# Patient Record
Sex: FEMALE | Race: WHITE | Employment: OTHER | URBAN - METROPOLITAN AREA
[De-identification: names, ages, dates, MRNs, and addresses within clinical notes are randomized per-mention and may not be internally consistent; named-entity substitution may affect disease eponyms.]

---

## 2018-03-30 ENCOUNTER — HOSPITAL ENCOUNTER (EMERGENCY)
Facility: CLINIC | Age: 68
Discharge: HOME | DRG: 330 | End: 2018-03-30
Attending: EMERGENCY MEDICINE | Admitting: EMERGENCY MEDICINE
Payer: MEDICARE

## 2018-03-30 ENCOUNTER — APPOINTMENT (OUTPATIENT)
Dept: CT IMAGING | Facility: CLINIC | Age: 68
DRG: 330 | End: 2018-03-30
Attending: EMERGENCY MEDICINE
Payer: MEDICARE

## 2018-03-30 VITALS
TEMPERATURE: 97.6 F | OXYGEN SATURATION: 97 % | RESPIRATION RATE: 20 BRPM | HEART RATE: 85 BPM | DIASTOLIC BLOOD PRESSURE: 65 MMHG | SYSTOLIC BLOOD PRESSURE: 117 MMHG

## 2018-03-30 DIAGNOSIS — K59.01 SLOW TRANSIT CONSTIPATION: ICD-10-CM

## 2018-03-30 DIAGNOSIS — K57.92 ACUTE DIVERTICULITIS: ICD-10-CM

## 2018-03-30 LAB
ANION GAP SERPL CALCULATED.3IONS-SCNC: 8 MMOL/L (ref 3–14)
BASOPHILS # BLD AUTO: 0 10E9/L (ref 0–0.2)
BASOPHILS NFR BLD AUTO: 0.2 %
BUN SERPL-MCNC: 11 MG/DL (ref 7–30)
CALCIUM SERPL-MCNC: 9.3 MG/DL (ref 8.5–10.1)
CHLORIDE SERPL-SCNC: 102 MMOL/L (ref 94–109)
CO2 SERPL-SCNC: 25 MMOL/L (ref 20–32)
CREAT SERPL-MCNC: 0.8 MG/DL (ref 0.52–1.04)
DIFFERENTIAL METHOD BLD: ABNORMAL
EOSINOPHIL # BLD AUTO: 0 10E9/L (ref 0–0.7)
EOSINOPHIL NFR BLD AUTO: 0 %
ERYTHROCYTE [DISTWIDTH] IN BLOOD BY AUTOMATED COUNT: 13.1 % (ref 10–15)
GFR SERPL CREATININE-BSD FRML MDRD: 71 ML/MIN/1.7M2
GLUCOSE SERPL-MCNC: 155 MG/DL (ref 70–99)
HCT VFR BLD AUTO: 40.5 % (ref 35–47)
HGB BLD-MCNC: 13.5 G/DL (ref 11.7–15.7)
IMM GRANULOCYTES # BLD: 0.3 10E9/L (ref 0–0.4)
IMM GRANULOCYTES NFR BLD: 1.1 %
LYMPHOCYTES # BLD AUTO: 1.4 10E9/L (ref 0.8–5.3)
LYMPHOCYTES NFR BLD AUTO: 5.8 %
MCH RBC QN AUTO: 32.5 PG (ref 26.5–33)
MCHC RBC AUTO-ENTMCNC: 33.3 G/DL (ref 31.5–36.5)
MCV RBC AUTO: 98 FL (ref 78–100)
MONOCYTES # BLD AUTO: 1.2 10E9/L (ref 0–1.3)
MONOCYTES NFR BLD AUTO: 5 %
NEUTROPHILS # BLD AUTO: 21.7 10E9/L (ref 1.6–8.3)
NEUTROPHILS NFR BLD AUTO: 87.9 %
NRBC # BLD AUTO: 0 10*3/UL
NRBC BLD AUTO-RTO: 0 /100
PLATELET # BLD AUTO: 307 10E9/L (ref 150–450)
POTASSIUM SERPL-SCNC: 3.9 MMOL/L (ref 3.4–5.3)
RBC # BLD AUTO: 4.15 10E12/L (ref 3.8–5.2)
SODIUM SERPL-SCNC: 135 MMOL/L (ref 133–144)
WBC # BLD AUTO: 24.7 10E9/L (ref 4–11)

## 2018-03-30 PROCEDURE — 85025 COMPLETE CBC W/AUTO DIFF WBC: CPT | Performed by: EMERGENCY MEDICINE

## 2018-03-30 PROCEDURE — 74177 CT ABD & PELVIS W/CONTRAST: CPT

## 2018-03-30 PROCEDURE — 96374 THER/PROPH/DIAG INJ IV PUSH: CPT

## 2018-03-30 PROCEDURE — 96361 HYDRATE IV INFUSION ADD-ON: CPT

## 2018-03-30 PROCEDURE — A9270 NON-COVERED ITEM OR SERVICE: HCPCS | Mod: GY | Performed by: EMERGENCY MEDICINE

## 2018-03-30 PROCEDURE — 25000132 ZZH RX MED GY IP 250 OP 250 PS 637: Mod: GY | Performed by: EMERGENCY MEDICINE

## 2018-03-30 PROCEDURE — 25000128 H RX IP 250 OP 636: Performed by: EMERGENCY MEDICINE

## 2018-03-30 PROCEDURE — 99285 EMERGENCY DEPT VISIT HI MDM: CPT | Mod: 25

## 2018-03-30 PROCEDURE — 96375 TX/PRO/DX INJ NEW DRUG ADDON: CPT

## 2018-03-30 PROCEDURE — 36415 COLL VENOUS BLD VENIPUNCTURE: CPT | Performed by: EMERGENCY MEDICINE

## 2018-03-30 PROCEDURE — 80048 BASIC METABOLIC PNL TOTAL CA: CPT | Performed by: EMERGENCY MEDICINE

## 2018-03-30 RX ORDER — ONDANSETRON 2 MG/ML
4 INJECTION INTRAMUSCULAR; INTRAVENOUS EVERY 30 MIN PRN
Status: DISCONTINUED | OUTPATIENT
Start: 2018-03-30 | End: 2018-03-30 | Stop reason: HOSPADM

## 2018-03-30 RX ORDER — CIPROFLOXACIN 500 MG/1
500 TABLET, FILM COATED ORAL 2 TIMES DAILY
Qty: 20 TABLET | Refills: 0 | Status: SHIPPED | OUTPATIENT
Start: 2018-03-30 | End: 2018-03-31

## 2018-03-30 RX ORDER — BISACODYL 5 MG/1
15 TABLET, DELAYED RELEASE ORAL ONCE
Qty: 4 TABLET | Refills: 0 | Status: ON HOLD | OUTPATIENT
Start: 2018-03-30 | End: 2018-03-31

## 2018-03-30 RX ORDER — IOPAMIDOL 755 MG/ML
500 INJECTION, SOLUTION INTRAVASCULAR ONCE
Status: COMPLETED | OUTPATIENT
Start: 2018-03-30 | End: 2018-03-30

## 2018-03-30 RX ORDER — SODIUM CHLORIDE 9 MG/ML
1000 INJECTION, SOLUTION INTRAVENOUS CONTINUOUS
Status: DISCONTINUED | OUTPATIENT
Start: 2018-03-30 | End: 2018-03-30 | Stop reason: HOSPADM

## 2018-03-30 RX ORDER — METRONIDAZOLE 500 MG/1
500 TABLET ORAL 4 TIMES DAILY
Qty: 40 TABLET | Refills: 0 | Status: SHIPPED | OUTPATIENT
Start: 2018-03-30 | End: 2018-03-31

## 2018-03-30 RX ORDER — HYDROMORPHONE HYDROCHLORIDE 1 MG/ML
0.5 INJECTION, SOLUTION INTRAMUSCULAR; INTRAVENOUS; SUBCUTANEOUS
Status: DISCONTINUED | OUTPATIENT
Start: 2018-03-30 | End: 2018-03-30 | Stop reason: HOSPADM

## 2018-03-30 RX ADMIN — DOCUSATE SODIUM 286 ML: 50 LIQUID ORAL at 11:01

## 2018-03-30 RX ADMIN — ONDANSETRON 4 MG: 2 INJECTION INTRAMUSCULAR; INTRAVENOUS at 12:00

## 2018-03-30 RX ADMIN — SODIUM CHLORIDE 1000 ML: 9 INJECTION, SOLUTION INTRAVENOUS at 12:00

## 2018-03-30 RX ADMIN — SODIUM CHLORIDE 65 ML: 9 INJECTION, SOLUTION INTRAVENOUS at 12:33

## 2018-03-30 RX ADMIN — IOPAMIDOL 100 ML: 755 INJECTION, SOLUTION INTRAVENOUS at 12:33

## 2018-03-30 RX ADMIN — HYDROMORPHONE HYDROCHLORIDE 0.5 MG: 1 INJECTION, SOLUTION INTRAMUSCULAR; INTRAVENOUS; SUBCUTANEOUS at 12:00

## 2018-03-30 ASSESSMENT — ENCOUNTER SYMPTOMS
CONSTIPATION: 1
ABDOMINAL PAIN: 1

## 2018-03-30 NOTE — ED PROVIDER NOTES
History     Chief Complaint:  Constipation      HPI   Christina Cotto is a 68 year old female with a history of hip and knee pain who presents to the emergency department for evaluation of constipation. For the past two to three weeks, the patient reports ongoing constipation and bilateral lower abdominal pain. She states she was trying to get this under control with increased fiber and fluid intake as well as stool softeners and fleets. Yesterday, the patient states she went to [facility] for bilateral hip xray's baseline pain she was having. She notes constipation was seen on these scans and Bulan prompted to the patient to seek evaluation when she returned home.    Here, she reports her last bowel movement was two weeks ago. She denies vomiting and fevers. She denies prior history of constipations. She denies chronic use of narcotics.     Allergies:  NKDA     Medications:    Xanax  Prozav  Prilosec  Aldactone  Percocet  Flomax    Past Medical History:    Diverticulitis    Past Surgical History:    The patient does not have any pertinent past surgical history.    Family History:    No past pertinent family history.    Social History:  Negative for tobacco use.  Negative for alcohol use.  Marital Status:        Review of Systems   Gastrointestinal: Positive for abdominal pain and constipation.   Musculoskeletal:        Positive for bilateral hip and knee pain   All other systems reviewed and are negative.      Physical Exam   First Vitals:  BP: 123/78  Pulse: 85  Heart Rate: 85  Temp: 97.6  F (36.4  C)  Resp: 20  SpO2: 96 %    Physical Exam  General: Patient is alert and cooperative.  HENT:  Normal nose, oropharynx. Moist oral mucosa.  Eyes: EOMI. Normal conjunctiva.  Neck:  Normal range of motion and appearance.   Cardiovascular:  Normal rate, regular rhythm and normal heart sounds.   Pulmonary/Chest:  Effort normal. No wheezing or crackles.  Abdominal: Obese. Soft. No distension; mild diffuse lower  abdominal tenderness.    Musculoskeletal: Normal range of motion. No edema or tenderness.   Neurological: oriented, normal strength, sensation, and coordination.   Skin: Warm and dry. No rash or bruising.   Psychiatric: Normal mood and affect. Normal behavior and judgement.      Emergency Department Course   Imaging:  Radiographic findings were communicated with the patient who voiced understanding of the findings.    CT Abdomen/Pelvis with IV contrast:   1. Colonic diverticulosis with sigmoid pericolonic inflammatory  stranding suggesting diverticulitis.  2. Right adrenal stable/benign 3.0 x 2.5 cm nodule.  3. Hepatic fatty infiltration--possible etiologies include consumption  of alcohol or excessive carbohydrate intake, especially  sugar/fructose.  Metabolic syndrome commonly occurs in combination  with nonalcoholic fatty liver disease. Although often reversible,  nonalcoholic fatty liver disease can progress to steatohepatitis and  cirrhosis. As per radiology.    Laboratory:  BMP: Glucose 155 (H), o/w WNL (Creatinine: 0.80)  CBC: WBC: 24.7 (H), HGB: 13.5, PLT: 307    Interventions:  1101 Pink lady enema rectal given  1200 NS 1L IV   Zofran, 4 mg, IV injection   Dilaudid, 0.5 mg, IV injection    Emergency Department Course:  1050 Nursing notes and vitals reviewed.  I performed an exam of the patient as documented above.     IV inserted. Medicine administered as documented above. Blood drawn. This was sent to the lab for further testing, results above.    1132 I rechecked the patient and discussed the results of her workup thus far. The patient states she has had moderate relief from the pink lady but is now concerned about diverticulitis.     The patient was sent for a abdomen plevis CT while in the emergency department, findings above.     Findings and plan explained to the Patient. Patient discharged home with instructions regarding supportive care, medications, and reasons to return. The importance of close  "follow-up was reviewed. The patient was prescribed dulcolax, Cipro, flagyl, polyethylene glycol.    I personally reviewed the laboratory results with the Patient and answered all related questions prior to discharge.     Impression & Plan    Medical Decision Making:  Christina Cotto is a afebril 68 year old female who presented with chief complaint of approximately 2 week history of constipation. She has had no associated vomiting, fevers, or recent blood in stools. She is complaining of diffuse intermittent abdominal discomfort. Based on upon this clinical senario, a pink lady enema was offered and patient was agreeable with. This produced moderate results. However following this, the patient complained of some low abdominal pain that felt reminiscent to her prior diverticulitis. Therefore CT abdomen pelvis was subsequently taken along with some screening laboratory tests. The scan shows colonic diverticulosis with sigmoid pericolonic inflammatory stranding consisted with mild diverticulitis. She does have elevated white blood cell count with normal electrolytes. This findings were discussed with her and is tolerating PO. I believe she is a good candidate for outpatient treatment. I have recommended a 10 course of cipro and flagyl. Due to her two weeks of constipation, a likely colon prep for definitive management. Should symptoms not resolve by next week, she should be seen in clinic.     Critical Care time:  none    Diagnosis:    ICD-10-CM    1. Acute diverticulitis K57.92    2. Slow transit constipation K59.01        Disposition:  discharged to home    Discharge Medications:  New Prescriptions    BISACODYL (DULCOLAX) 5 MG EC TABLET    Take 3 tablets (15 mg) by mouth once for 1 dose Refer to \"Getting Ready for a Colonoscopy\" instruction handout    CIPROFLOXACIN (CIPRO) 500 MG TABLET    Take 1 tablet (500 mg) by mouth 2 times daily for 10 days    METRONIDAZOLE (FLAGYL) 500 MG TABLET    Take 1 tablet (500 mg) by " "mouth 4 times daily for 10 days    POLYETHYLENE GLYCOL (GOLYTELY/NULYTELY) 236 G SUSPENSION    Take 4,000 mLs (4 L) by mouth once for 1 dose Refer to \"Getting Ready for a Colonoscopy\" instruction handout       I, Felicia Roberson, am serving as a scribe on 3/30/2018 at 10:57 AM to personally document services performed by Malcom Garvey MD based on my observations and the provider's statements to me.     Felicia Roberson  3/30/2018   [facility] EMERGENCY DEPARTMENT       Malcom Garvey MD  03/31/18 2014    "

## 2018-03-30 NOTE — ED AVS SNAPSHOT
[facility]    [address] [place] [address]    Phone:  [phone]    Fax:  [phone]                                       Christina Cotto   MRN: [id number]    Department:  [facility]   Date of Visit:  3/30/2018           Patient Information     Date Of Birth          1950        Your diagnoses for this visit were:     Acute diverticulitis     Slow transit constipation        You were seen by Malcom Garvey MD.      Follow-up Information     Follow up with Ike Caldwell    Specialty:  Family Practice    Why:  for re-evaluation of your symptoms next week if not improved    Contact information:    [address] [address] [phone]          Discharge Instructions       Discharge Instructions  Diverticulitis  Your provider has diagnosed you with diverticulitis.  Diverticulitis is an infection of a diverticulum, which is a tiny sack-like structure that protrudes off the wall of the colon (large intestine).  These sacks are created over years of increased pressure in the colon (this is diverticulosis), usually as a result of a diet without enough fruits, vegetables, and whole grains. Because these sacks are small, bacteria can get trapped inside them and cause an infection (this is divericulitis).  This infection often causes abdominal (belly) pain, fever, nausea (sick to stomach), and vomiting (throwing up). Diverticulitis is usually treated at home.  However, sometimes diverticulitis needs treatment in the hospital and may even need surgery.    Generally, every Emergency Department visit should have a follow-up clinic visit with either a primary or a specialty clinic/provider. Please follow-up as instructed by your emergency provider today.    Return to the Emergency Department if:     You get an oral temperature above 102oF or as directed by your provider.    You have blood in  "your stools (bright red or black, tarry stools), or have blood in your vomit.    You keep vomiting or cannot drink liquids or cannot keep your medicine down.    You cannot have a bowel movement or you cannot pass gas.    Your stomach gets bloated or bigger.    You faint or become very weak.    Your pain is too bad to tolerate.    You have new symptoms or anything that worries you.    What can I do to help myself?    Fill any antibiotic prescriptions the provider gave you and take them right away. Be sure to finish the whole antibiotic prescription.    For the first day or two at home drink only clear liquids.  This lets your intestines rest.    If your pain has improved after one or two days, you may start eating mild foods. Soda crackers, toast, plain noodles, gelatin, applesauce and bananas are good first choices.  Avoid foods that have acid, are spicy, fatty or fibrous (such as meats, coarse grains, vegetables). You may start eating these foods again in about 3-4 days when you are better.    Once you are back to normal, eat a high fiber diet of fruits, vegetables and whole grains. Some people think you should avoid eating nuts, seeds, and corn, but there is no definite proof this makes any difference in whether you will get diverticulitis again.     Pain and fever can be treated with Tylenol  (acetaminophen) or you might have been prescribed a pain medication.    Probiotics: If you have been given an antibiotic, you may want to also take a probiotic pill or eat yogurt with live cultures. Probiotics have \"good bacteria\" to help your intestines stay healthy. Studies have shown that probiotics help prevent diarrhea and other intestine problems (including C. diff infection) when you take antibiotics. You can buy these without a prescription in the pharmacy section of the store.  If you were given a prescription for medicine here today, be sure to read all of the information (including the package insert) that comes " with your prescription.  This will include important information about the medicine, its side effects, and any warnings that you need to know about.  The pharmacist who fills the prescription can provide more information and answer questions you may have about the medicine.  If you have questions or concerns that the pharmacist cannot address, please call or return to the Emergency Department.     Remember that you can always come back to the Emergency Department if you are not able to see your regular provider in the amount of time listed above, if you get any new symptoms, or if there is anything that worries you.  Discharge Instructions  Constipation  Constipation can cause severe cramping pain and your provider thinks this might be the cause of your abdominal pain (belly pain) today.  People usually recognize that they are constipated because they have difficulty having bowel movements, are not having bowel movements frequently enough, or are not having large enough bowel movements. Sometimes, especially in children or older people, you do not recognize that you are constipated until it becomes severe. The most common causes of constipation are a lack of exercise and not eating enough fruits, vegetables, and whole grains. Constipation can also be a side effect of medications, such as narcotics, or may be caused by a disease of the digestive system.    Generally, every Emergency Department visit should have a follow-up clinic visit with either a primary or a specialty clinic/provider. Please follow-up as instructed by your emergency provider today. Sometimes, chronic constipation requires further testing to determine the cause. If you are over 50 years old, you may need a colonoscopy if you have not had one before.     Return to the Emergency Department if:    Your abdominal pain worsens or does not improve after a bowel movement.    You become very weak.    You get a temperature above 102oF or as directed by  your provider.    You have blood in your stools (bright red or black, tarry stools).    You keep vomiting (throwing up) or cannot drink liquids.    Your see blood when you vomit.    Your stomach gets bloated or bigger.    You have new symptoms or anything that worries you.    What can I do to help myself?    If your provider gave you a cathartic medication, like magnesium citrate or GoLytely  (polyethylene glycol), you can expect to have cramps and gas pains after taking it. You can expect to have a number of bowel movements and even diarrhea (loose or watery stools) in the course of clearing your bowels.  You will know your bowels have been cleaned out after you pass clear liquid. The cramps and gas should let up after you have emptied your bowels. You may want to wait until morning to take this type of medication so you aren t up in the night.     Sometimes instead of cathartics, we recommend laxatives like milk of magnesia to move your bowels more slowly, or an enema to help the bowels to move. Read and follow the package directions, or follow your provider s instructions.    Once you have become very constipated, it takes time for your bowels to return to normal and you need to be very careful to prevent becoming constipated again. Take a laxative if you do not move your bowels at least every two days.       Eat foods that have a lot of fiber. Good choices are fruits, vegetables, prune juice, apple juice, and high fiber cereal. Limit dairy products such as milk and cheese, since these can make constipation worse.     Drink plenty of water.     When you feel the need to go to the bathroom, go to the bathroom. Do not hold it.    Miralax , Metamucil , Colace , Senna or fiber supplements can be used daily.  Miralax  daily is often the best choice for children.  If you were given a prescription for medicine here today, be sure to read all of the information (including the package insert) that comes with your  "prescription.  This will include important information about the medicine, its side effects, and any warnings that you need to know about.  The pharmacist who fills the prescription can provide more information and answer questions you may have about the medicine.  If you have questions or concerns that the pharmacist cannot address, please call or return to the Emergency Department.   Remember that you can always come back to the Emergency Department if you are not able to see your regular provider in the amount of time listed above, if you get any new symptoms, or if there is anything that worries you.      24 Hour Appointment Hotline       To make an appointment at any [facility], call [phone] ([phone]). If you don't have a family doctor or clinic, we will help you find one. [facility] clinics are conveniently located to serve the needs of you and your family.             Review of your medicines      START taking        Dose / Directions Last dose taken    bisacodyl 5 MG EC tablet   Commonly known as:  DULCOLAX   Dose:  15 mg   Quantity:  4 tablet        Take 3 tablets (15 mg) by mouth once for 1 dose Refer to \"Getting Ready for a Colonoscopy\" instruction handout   Refills:  0        ciprofloxacin 500 MG tablet   Commonly known as:  CIPRO   Dose:  500 mg   Quantity:  20 tablet        Take 1 tablet (500 mg) by mouth 2 times daily for 10 days   Refills:  0        metroNIDAZOLE 500 MG tablet   Commonly known as:  FLAGYL   Dose:  500 mg   Quantity:  40 tablet        Take 1 tablet (500 mg) by mouth 4 times daily for 10 days   Refills:  0        polyethylene glycol 236 G suspension   Commonly known as:  GoLYTELY/NuLYTELY   Dose:  4 L   Quantity:  4000 mL        Take 4,000 mLs (4 L) by mouth once for 1 dose Refer to \"Getting Ready for a Colonoscopy\" instruction handout   Refills:  0          Our records show that you are taking the medicines listed below. If these are incorrect, please call your family " doctor or clinic.        Dose / Directions Last dose taken    ALDACTONE PO        Refills:  0        aspirin 81 MG chewable tablet   Dose:  81 mg        Take 81 mg by mouth daily   Refills:  0        oxyCODONE-acetaminophen 5-325 MG per tablet   Commonly known as:  PERCOCET   Dose:  1-2 tablet   Quantity:  15 tablet        Take 1-2 tablets by mouth every 4 hours as needed for moderate to severe pain   Refills:  0        PRILOSEC PO        Refills:  0        PROZAC PO   Dose:  40 mg        Take 40 mg by mouth   Refills:  0        tamsulosin 0.4 MG capsule   Commonly known as:  FLOMAX   Dose:  0.4 mg   Quantity:  7 capsule        Take 1 capsule (0.4 mg) by mouth daily   Refills:  1        XANAX PO        Refills:  0                Prescriptions were sent or printed at these locations (4 Prescriptions)                   Other Prescriptions                Printed at Department/Unit printer (4 of 4)         ciprofloxacin (CIPRO) 500 MG tablet               metroNIDAZOLE (FLAGYL) 500 MG tablet               bisacodyl (DULCOLAX) 5 MG EC tablet               polyethylene glycol (GOLYTELY/NULYTELY) 236 G suspension                Procedures and tests performed during your visit     Basic metabolic panel    CBC with platelets differential    CT Abdomen Pelvis w Contrast      Orders Needing Specimen Collection     None      Pending Results     Date and Time Order Name Status Description    3/30/2018 1133 CT Abdomen Pelvis w Contrast Preliminary             Pending Culture Results     No orders found from 3/28/2018 to 3/31/2018.            Pending Results Instructions     If you had any lab results that were not finalized at the time of your Discharge, you can call the ED Lab Result RN at [phone]. You will be contacted by this team for any positive Lab results or changes in treatment. The nurses are available 7 days a week from 10A to 6:30P.  You can leave a message 24 hours per day and they will return your call.         Test Results From Your Hospital Stay        3/30/2018 12:52 PM      Narrative     CT ABDOMEN AND PELVIS WITH CONTRAST  3/30/2018 12:41 PM     HISTORY:  Abdominal pain. History diverticulitis.     CONTRAST DOSE:  100 mL Isovue-370    Radiation dose for this scan was reduced using automated exposure  control, adjustment of the mA and/or kV according to patient size, or  iterative reconstruction technique.    FINDINGS:  Hepatic fatty infiltration is noted. Enhancing 3.0 x 2.5 cm  right adrenal nodule is noted, unchanged in size from 4/6/2015 and  therefore presumably a benign adenoma. The spleen, kidneys, pancreas,  and gallbladder appear within normal limits. There is scattered  colonic diverticula, most prominently in the sigmoid colon.  Pericolonic inflammatory stranding is present in the lower sigmoid  colon suspicious for diverticulitis. There may be trace peritoneal  fluid within the pelvis. No free peritoneal air. No evidence of bowel  obstruction.        Impression     IMPRESSION:  1. Colonic diverticulosis with sigmoid pericolonic inflammatory  stranding suggesting diverticulitis.  2. Right adrenal stable/benign 3.0 x 2.5 cm nodule.  3. Hepatic fatty infiltration--possible etiologies include consumption  of alcohol or excessive carbohydrate intake, especially  sugar/fructose.  Metabolic syndrome commonly occurs in combination  with nonalcoholic fatty liver disease. Although often reversible,  nonalcoholic fatty liver disease can progress to steatohepatitis and  cirrhosis.         3/30/2018 12:18 PM      Component Results     Component Value Ref Range & Units Status    Sodium 135 133 - 144 mmol/L Final    Potassium 3.9 3.4 - 5.3 mmol/L Final    Chloride 102 94 - 109 mmol/L Final    Carbon Dioxide 25 20 - 32 mmol/L Final    Anion Gap 8 3 - 14 mmol/L Final    Glucose 155 (H) 70 - 99 mg/dL Final    Urea Nitrogen 11 7 - 30 mg/dL Final    Creatinine 0.80 0.52 - 1.04 mg/dL Final    GFR Estimate 71 >60 mL/min/1.7m2  Final    Non  GFR Calc    GFR Estimate If Black 86 >60 mL/min/1.7m2 Final    African American GFR Calc    Calcium 9.3 8.5 - 10.1 mg/dL Final         3/30/2018 12:03 PM      Component Results     Component Value Ref Range & Units Status    WBC 24.7 (H) 4.0 - 11.0 10e9/L Final    RBC Count 4.15 3.8 - 5.2 10e12/L Final    Hemoglobin 13.5 11.7 - 15.7 g/dL Final    Hematocrit 40.5 35.0 - 47.0 % Final    MCV 98 78 - 100 fl Final    MCH 32.5 26.5 - 33.0 pg Final    MCHC 33.3 31.5 - 36.5 g/dL Final    RDW 13.1 10.0 - 15.0 % Final    Platelet Count 307 150 - 450 10e9/L Final    Diff Method Automated Method  Final    % Neutrophils 87.9 % Final    % Lymphocytes 5.8 % Final    % Monocytes 5.0 % Final    % Eosinophils 0.0 % Final    % Basophils 0.2 % Final    % Immature Granulocytes 1.1 % Final    Nucleated RBCs 0 0 /100 Final    Absolute Neutrophil 21.7 (H) 1.6 - 8.3 10e9/L Final    Absolute Lymphocytes 1.4 0.8 - 5.3 10e9/L Final    Absolute Monocytes 1.2 0.0 - 1.3 10e9/L Final    Absolute Eosinophils 0.0 0.0 - 0.7 10e9/L Final    Absolute Basophils 0.0 0.0 - 0.2 10e9/L Final    Abs Immature Granulocytes 0.3 0 - 0.4 10e9/L Final    Absolute Nucleated RBC 0.0  Final                Clinical Quality Measure: Blood Pressure Screening     Your blood pressure was checked while you were in the emergency department today. The last reading we obtained was  BP: 127/69 . Please read the guidelines below about what these numbers mean and what you should do about them.  If your systolic blood pressure (the top number) is less than 120 and your diastolic blood pressure (the bottom number) is less than 80, then your blood pressure is normal. There is nothing more that you need to do about it.  If your systolic blood pressure (the top number) is 120-139 or your diastolic blood pressure (the bottom number) is 80-89, your blood pressure may be higher than it should be. You should have your blood pressure rechecked within a year  "by a primary care provider.  If your systolic blood pressure (the top number) is 140 or greater or your diastolic blood pressure (the bottom number) is 90 or greater, you may have high blood pressure. High blood pressure is treatable, but if left untreated over time it can put you at risk for heart attack, stroke, or kidney failure. You should have your blood pressure rechecked by a primary care provider within the next 4 weeks.  If your provider in the emergency department today gave you specific instructions to follow-up with your doctor or provider even sooner than that, you should follow that instruction and not wait for up to 4 weeks for your follow-up visit.        Thank you for choosing [facility]       Thank you for choosing [facility] for your care. Our goal is always to provide you with excellent care. Hearing back from our patients is one way we can continue to improve our services. Please take a few minutes to complete the written survey that you may receive in the mail after you visit with us. Thank you!        [employer] Information     [employer] lets you send messages to your doctor, view your test results, renew your prescriptions, schedule appointments and more. To sign up, go to [email or web address] . Click on \"Log in\" on the left side of the screen, which will take you to the Welcome page. Then click on \"Sign up Now\" on the right side of the page.     You will be asked to enter the access code listed below, as well as some personal information. Please follow the directions to create your username and password.     Your access code is: [email or web address]  Expires: 2018  1:29 PM     Your access code will  in 90 days. If you need help or a new code, please call your [facility] clinic or [phone].        Care EveryWhere ID     This is your Care EveryWhere ID. This could be used by other organizations to access your [facility] medical records  [id number]        Equal Access to Services     MEHUL CARIAS" AH: Jose Angel Fletcher, waelaina lumikeyadaha, qaraymondta kajanice mccarthy. So [facility] [phone].    ATENCIÓN: Si habla español, tiene a mariscal disposición servicios gratuitos de asistencia lingüística. Llame al [phone].    We comply with applicable federal civil rights laws and [place] laws. We do not discriminate on the basis of race, color, national origin, age, disability, sex, sexual orientation, or gender identity.            After Visit Summary       This is your record. Keep this with you and show to your community pharmacist(s) and doctor(s) at your next visit.

## 2018-03-30 NOTE — DISCHARGE INSTRUCTIONS
Discharge Instructions  Diverticulitis  Your provider has diagnosed you with diverticulitis.  Diverticulitis is an infection of a diverticulum, which is a tiny sack-like structure that protrudes off the wall of the colon (large intestine).  These sacks are created over years of increased pressure in the colon (this is diverticulosis), usually as a result of a diet without enough fruits, vegetables, and whole grains. Because these sacks are small, bacteria can get trapped inside them and cause an infection (this is divericulitis).  This infection often causes abdominal (belly) pain, fever, nausea (sick to stomach), and vomiting (throwing up). Diverticulitis is usually treated at home.  However, sometimes diverticulitis needs treatment in the hospital and may even need surgery.    Generally, every Emergency Department visit should have a follow-up clinic visit with either a primary or a specialty clinic/provider. Please follow-up as instructed by your emergency provider today.    Return to the Emergency Department if:     You get an oral temperature above 102oF or as directed by your provider.    You have blood in your stools (bright red or black, tarry stools), or have blood in your vomit.    You keep vomiting or cannot drink liquids or cannot keep your medicine down.    You cannot have a bowel movement or you cannot pass gas.    Your stomach gets bloated or bigger.    You faint or become very weak.    Your pain is too bad to tolerate.    You have new symptoms or anything that worries you.    What can I do to help myself?    Fill any antibiotic prescriptions the provider gave you and take them right away. Be sure to finish the whole antibiotic prescription.    For the first day or two at home drink only clear liquids.  This lets your intestines rest.    If your pain has improved after one or two days, you may start eating mild foods. Soda crackers, toast, plain noodles, gelatin, applesauce and bananas are good first  "choices.  Avoid foods that have acid, are spicy, fatty or fibrous (such as meats, coarse grains, vegetables). You may start eating these foods again in about 3-4 days when you are better.    Once you are back to normal, eat a high fiber diet of fruits, vegetables and whole grains. Some people think you should avoid eating nuts, seeds, and corn, but there is no definite proof this makes any difference in whether you will get diverticulitis again.     Pain and fever can be treated with Tylenol  (acetaminophen) or you might have been prescribed a pain medication.    Probiotics: If you have been given an antibiotic, you may want to also take a probiotic pill or eat yogurt with live cultures. Probiotics have \"good bacteria\" to help your intestines stay healthy. Studies have shown that probiotics help prevent diarrhea and other intestine problems (including C. diff infection) when you take antibiotics. You can buy these without a prescription in the pharmacy section of the store.  If you were given a prescription for medicine here today, be sure to read all of the information (including the package insert) that comes with your prescription.  This will include important information about the medicine, its side effects, and any warnings that you need to know about.  The pharmacist who fills the prescription can provide more information and answer questions you may have about the medicine.  If you have questions or concerns that the pharmacist cannot address, please call or return to the Emergency Department.     Remember that you can always come back to the Emergency Department if you are not able to see your regular provider in the amount of time listed above, if you get any new symptoms, or if there is anything that worries you.  Discharge Instructions  Constipation  Constipation can cause severe cramping pain and your provider thinks this might be the cause of your abdominal pain (belly pain) today.  People usually " recognize that they are constipated because they have difficulty having bowel movements, are not having bowel movements frequently enough, or are not having large enough bowel movements. Sometimes, especially in children or older people, you do not recognize that you are constipated until it becomes severe. The most common causes of constipation are a lack of exercise and not eating enough fruits, vegetables, and whole grains. Constipation can also be a side effect of medications, such as narcotics, or may be caused by a disease of the digestive system.    Generally, every Emergency Department visit should have a follow-up clinic visit with either a primary or a specialty clinic/provider. Please follow-up as instructed by your emergency provider today. Sometimes, chronic constipation requires further testing to determine the cause. If you are over 50 years old, you may need a colonoscopy if you have not had one before.     Return to the Emergency Department if:    Your abdominal pain worsens or does not improve after a bowel movement.    You become very weak.    You get a temperature above 102oF or as directed by your provider.    You have blood in your stools (bright red or black, tarry stools).    You keep vomiting (throwing up) or cannot drink liquids.    Your see blood when you vomit.    Your stomach gets bloated or bigger.    You have new symptoms or anything that worries you.    What can I do to help myself?    If your provider gave you a cathartic medication, like magnesium citrate or GoLytely  (polyethylene glycol), you can expect to have cramps and gas pains after taking it. You can expect to have a number of bowel movements and even diarrhea (loose or watery stools) in the course of clearing your bowels.  You will know your bowels have been cleaned out after you pass clear liquid. The cramps and gas should let up after you have emptied your bowels. You may want to wait until morning to take this type of  medication so you aren t up in the night.     Sometimes instead of cathartics, we recommend laxatives like milk of magnesia to move your bowels more slowly, or an enema to help the bowels to move. Read and follow the package directions, or follow your provider s instructions.    Once you have become very constipated, it takes time for your bowels to return to normal and you need to be very careful to prevent becoming constipated again. Take a laxative if you do not move your bowels at least every two days.       Eat foods that have a lot of fiber. Good choices are fruits, vegetables, prune juice, apple juice, and high fiber cereal. Limit dairy products such as milk and cheese, since these can make constipation worse.     Drink plenty of water.     When you feel the need to go to the bathroom, go to the bathroom. Do not hold it.    Miralax , Metamucil , Colace , Senna or fiber supplements can be used daily.  Miralax  daily is often the best choice for children.  If you were given a prescription for medicine here today, be sure to read all of the information (including the package insert) that comes with your prescription.  This will include important information about the medicine, its side effects, and any warnings that you need to know about.  The pharmacist who fills the prescription can provide more information and answer questions you may have about the medicine.  If you have questions or concerns that the pharmacist cannot address, please call or return to the Emergency Department.   Remember that you can always come back to the Emergency Department if you are not able to see your regular provider in the amount of time listed above, if you get any new symptoms, or if there is anything that worries you.

## 2018-03-30 NOTE — ED NOTES
"Patient states that \"pain is worse, is now nauseated, and is dry\". Patient also states pain now \"feels like diverticula\"  MD made aware of patient statements.   "

## 2018-03-30 NOTE — ED AVS SNAPSHOT
[facility]    [address]    [facility] [address]    Phone:  [phone]    Fax:  [phone]                                       Christina Cotto   MRN: [id number]    Department:  [facility]   Date of Visit:  3/30/2018           After Visit Summary Signature Page     I have received my discharge instructions, and my questions have been answered. I have discussed any challenges I see with this plan with the nurse or doctor.    ..........................................................................................................................................  Patient/Patient Representative Signature      ..........................................................................................................................................  Patient Representative Print Name and Relationship to Patient    ..................................................               ................................................  Date                                            Time    ..........................................................................................................................................  Reviewed by Signature/Title    ...................................................              ..............................................  Date                                                            Time

## 2018-03-30 NOTE — ED NOTES
Reports constipation, bilateral lower abdominal pain for 2 weeks, much worse since last night; states constipation seen on bilateral hip xrays at [facility] yesterday. ABCs intact.

## 2018-03-31 ENCOUNTER — HOSPITAL ENCOUNTER (INPATIENT)
Facility: CLINIC | Age: 68
LOS: 6 days | Discharge: HOME | DRG: 330 | End: 2018-04-06
Attending: EMERGENCY MEDICINE | Admitting: SURGERY
Payer: MEDICARE

## 2018-03-31 ENCOUNTER — APPOINTMENT (OUTPATIENT)
Dept: CT IMAGING | Facility: CLINIC | Age: 68
DRG: 330 | End: 2018-03-31
Attending: EMERGENCY MEDICINE
Payer: MEDICARE

## 2018-03-31 ENCOUNTER — SURGERY (OUTPATIENT)
Age: 68
End: 2018-03-31

## 2018-03-31 ENCOUNTER — ANESTHESIA EVENT (OUTPATIENT)
Dept: SURGERY | Facility: CLINIC | Age: 68
DRG: 330 | End: 2018-03-31
Payer: MEDICARE

## 2018-03-31 ENCOUNTER — ANESTHESIA (OUTPATIENT)
Dept: SURGERY | Facility: CLINIC | Age: 68
DRG: 330 | End: 2018-03-31
Payer: MEDICARE

## 2018-03-31 DIAGNOSIS — K63.1 PERFORATED BOWEL (H): ICD-10-CM

## 2018-03-31 DIAGNOSIS — K57.92 ACUTE DIVERTICULITIS OF INTESTINE: ICD-10-CM

## 2018-03-31 DIAGNOSIS — K57.80 DIVERTICULITIS OF INTESTINE WITH PERFORATION WITHOUT ABSCESS OR BLEEDING, UNSPECIFIED PART OF INTESTINAL TRACT: Primary | ICD-10-CM

## 2018-03-31 LAB
ANION GAP SERPL CALCULATED.3IONS-SCNC: 5 MMOL/L (ref 3–14)
BASOPHILS # BLD AUTO: 0 10E9/L (ref 0–0.2)
BASOPHILS NFR BLD AUTO: 0.2 %
BUN SERPL-MCNC: 12 MG/DL (ref 7–30)
CALCIUM SERPL-MCNC: 9.2 MG/DL (ref 8.5–10.1)
CHLORIDE SERPL-SCNC: 102 MMOL/L (ref 94–109)
CO2 SERPL-SCNC: 26 MMOL/L (ref 20–32)
CREAT SERPL-MCNC: 0.82 MG/DL (ref 0.52–1.04)
CREAT SERPL-MCNC: 0.85 MG/DL (ref 0.52–1.04)
DIFFERENTIAL METHOD BLD: ABNORMAL
EOSINOPHIL # BLD AUTO: 0.1 10E9/L (ref 0–0.7)
EOSINOPHIL NFR BLD AUTO: 0.4 %
ERYTHROCYTE [DISTWIDTH] IN BLOOD BY AUTOMATED COUNT: 13.3 % (ref 10–15)
GFR SERPL CREATININE-BSD FRML MDRD: 66 ML/MIN/1.7M2
GFR SERPL CREATININE-BSD FRML MDRD: 68 ML/MIN/1.7M2
GLUCOSE SERPL-MCNC: 134 MG/DL (ref 70–99)
GRAM STN SPEC: ABNORMAL
HCT VFR BLD AUTO: 40.1 % (ref 35–47)
HGB BLD-MCNC: 13 G/DL (ref 11.7–15.7)
IMM GRANULOCYTES # BLD: 0.3 10E9/L (ref 0–0.4)
IMM GRANULOCYTES NFR BLD: 1.6 %
LYMPHOCYTES # BLD AUTO: 1.1 10E9/L (ref 0.8–5.3)
LYMPHOCYTES NFR BLD AUTO: 5.3 %
MCH RBC QN AUTO: 32.3 PG (ref 26.5–33)
MCHC RBC AUTO-ENTMCNC: 32.4 G/DL (ref 31.5–36.5)
MCV RBC AUTO: 100 FL (ref 78–100)
MONOCYTES # BLD AUTO: 1.2 10E9/L (ref 0–1.3)
MONOCYTES NFR BLD AUTO: 5.6 %
NEUTROPHILS # BLD AUTO: 18 10E9/L (ref 1.6–8.3)
NEUTROPHILS NFR BLD AUTO: 86.9 %
NRBC # BLD AUTO: 0 10*3/UL
NRBC BLD AUTO-RTO: 0 /100
PLATELET # BLD AUTO: 256 10E9/L (ref 150–450)
PLATELET # BLD AUTO: 285 10E9/L (ref 150–450)
POTASSIUM SERPL-SCNC: 3.9 MMOL/L (ref 3.4–5.3)
RBC # BLD AUTO: 4.03 10E12/L (ref 3.8–5.2)
SODIUM SERPL-SCNC: 133 MMOL/L (ref 133–144)
SPECIMEN SOURCE: ABNORMAL
WBC # BLD AUTO: 20.8 10E9/L (ref 4–11)

## 2018-03-31 PROCEDURE — 0DBN0ZZ EXCISION OF SIGMOID COLON, OPEN APPROACH: ICD-10-PCS | Performed by: SURGERY

## 2018-03-31 PROCEDURE — 71000013 ZZH RECOVERY PHASE 1 LEVEL 1 EA ADDTL HR: Performed by: SURGERY

## 2018-03-31 PROCEDURE — 93005 ELECTROCARDIOGRAM TRACING: CPT

## 2018-03-31 PROCEDURE — 40000306 ZZH STATISTIC PRE PROC ASSESS II: Performed by: SURGERY

## 2018-03-31 PROCEDURE — 36415 COLL VENOUS BLD VENIPUNCTURE: CPT | Performed by: SURGERY

## 2018-03-31 PROCEDURE — 99285 EMERGENCY DEPT VISIT HI MDM: CPT | Mod: 25

## 2018-03-31 PROCEDURE — 85049 AUTOMATED PLATELET COUNT: CPT | Performed by: SURGERY

## 2018-03-31 PROCEDURE — 74177 CT ABD & PELVIS W/CONTRAST: CPT

## 2018-03-31 PROCEDURE — 96375 TX/PRO/DX INJ NEW DRUG ADDON: CPT

## 2018-03-31 PROCEDURE — 87077 CULTURE AEROBIC IDENTIFY: CPT | Performed by: SURGERY

## 2018-03-31 PROCEDURE — 88307 TISSUE EXAM BY PATHOLOGIST: CPT | Mod: 26 | Performed by: SURGERY

## 2018-03-31 PROCEDURE — 25000128 H RX IP 250 OP 636: Performed by: EMERGENCY MEDICINE

## 2018-03-31 PROCEDURE — 82565 ASSAY OF CREATININE: CPT | Performed by: SURGERY

## 2018-03-31 PROCEDURE — 25000128 H RX IP 250 OP 636: Performed by: NURSE ANESTHETIST, CERTIFIED REGISTERED

## 2018-03-31 PROCEDURE — 96365 THER/PROPH/DIAG IV INF INIT: CPT

## 2018-03-31 PROCEDURE — 87205 SMEAR GRAM STAIN: CPT | Performed by: SURGERY

## 2018-03-31 PROCEDURE — 37000008 ZZH ANESTHESIA TECHNICAL FEE, 1ST 30 MIN: Performed by: SURGERY

## 2018-03-31 PROCEDURE — A9270 NON-COVERED ITEM OR SERVICE: HCPCS | Mod: GY | Performed by: SURGERY

## 2018-03-31 PROCEDURE — 88307 TISSUE EXAM BY PATHOLOGIST: CPT | Performed by: SURGERY

## 2018-03-31 PROCEDURE — 27210794 ZZH OR GENERAL SUPPLY STERILE: Performed by: SURGERY

## 2018-03-31 PROCEDURE — 36000093 ZZH SURGERY LEVEL 4 1ST 30 MIN: Performed by: SURGERY

## 2018-03-31 PROCEDURE — 25000125 ZZHC RX 250: Performed by: NURSE ANESTHETIST, CERTIFIED REGISTERED

## 2018-03-31 PROCEDURE — 87186 SC STD MICRODIL/AGAR DIL: CPT | Performed by: SURGERY

## 2018-03-31 PROCEDURE — 27210995 ZZH RX 272: Performed by: SURGERY

## 2018-03-31 PROCEDURE — 25000566 ZZH SEVOFLURANE, EA 15 MIN: Performed by: SURGERY

## 2018-03-31 PROCEDURE — 25000128 H RX IP 250 OP 636: Performed by: SURGERY

## 2018-03-31 PROCEDURE — C1765 ADHESION BARRIER: HCPCS | Performed by: SURGERY

## 2018-03-31 PROCEDURE — 44143 PARTIAL REMOVAL OF COLON: CPT | Performed by: SURGERY

## 2018-03-31 PROCEDURE — 44143 PARTIAL REMOVAL OF COLON: CPT | Mod: AS | Performed by: PHYSICIAN ASSISTANT

## 2018-03-31 PROCEDURE — 80048 BASIC METABOLIC PNL TOTAL CA: CPT | Performed by: EMERGENCY MEDICINE

## 2018-03-31 PROCEDURE — 0D1M0Z4 BYPASS DESCENDING COLON TO CUTANEOUS, OPEN APPROACH: ICD-10-PCS | Performed by: SURGERY

## 2018-03-31 PROCEDURE — 27211024 ZZHC OR SUPPLY OTHER OPNP: Performed by: SURGERY

## 2018-03-31 PROCEDURE — 87070 CULTURE OTHR SPECIMN AEROBIC: CPT | Performed by: SURGERY

## 2018-03-31 PROCEDURE — 87076 CULTURE ANAEROBE IDENT EACH: CPT | Performed by: SURGERY

## 2018-03-31 PROCEDURE — 99221 1ST HOSP IP/OBS SF/LOW 40: CPT | Mod: 57 | Performed by: SURGERY

## 2018-03-31 PROCEDURE — 12000000 ZZH R&B MED SURG/OB

## 2018-03-31 PROCEDURE — 25000128 H RX IP 250 OP 636: Performed by: ANESTHESIOLOGY

## 2018-03-31 PROCEDURE — 25000132 ZZH RX MED GY IP 250 OP 250 PS 637: Mod: GY | Performed by: SURGERY

## 2018-03-31 PROCEDURE — 37000009 ZZH ANESTHESIA TECHNICAL FEE, EACH ADDTL 15 MIN: Performed by: SURGERY

## 2018-03-31 PROCEDURE — 36000063 ZZH SURGERY LEVEL 4 EA 15 ADDTL MIN: Performed by: SURGERY

## 2018-03-31 PROCEDURE — 87075 CULTR BACTERIA EXCEPT BLOOD: CPT | Performed by: SURGERY

## 2018-03-31 PROCEDURE — 71000012 ZZH RECOVERY PHASE 1 LEVEL 1 FIRST HR: Performed by: SURGERY

## 2018-03-31 PROCEDURE — 85025 COMPLETE CBC W/AUTO DIFF WBC: CPT | Performed by: EMERGENCY MEDICINE

## 2018-03-31 RX ORDER — ONDANSETRON 2 MG/ML
4 INJECTION INTRAMUSCULAR; INTRAVENOUS EVERY 6 HOURS PRN
Status: DISCONTINUED | OUTPATIENT
Start: 2018-03-31 | End: 2018-04-06 | Stop reason: HOSPADM

## 2018-03-31 RX ORDER — SODIUM CHLORIDE, SODIUM LACTATE, POTASSIUM CHLORIDE, CALCIUM CHLORIDE 600; 310; 30; 20 MG/100ML; MG/100ML; MG/100ML; MG/100ML
INJECTION, SOLUTION INTRAVENOUS CONTINUOUS
Status: DISCONTINUED | OUTPATIENT
Start: 2018-03-31 | End: 2018-04-04

## 2018-03-31 RX ORDER — FENTANYL CITRATE 50 UG/ML
INJECTION, SOLUTION INTRAMUSCULAR; INTRAVENOUS PRN
Status: DISCONTINUED | OUTPATIENT
Start: 2018-03-31 | End: 2018-03-31

## 2018-03-31 RX ORDER — ACETAMINOPHEN 325 MG/1
975 TABLET ORAL EVERY 8 HOURS
Status: COMPLETED | OUTPATIENT
Start: 2018-03-31 | End: 2018-04-03

## 2018-03-31 RX ORDER — LIDOCAINE 40 MG/G
CREAM TOPICAL
Status: DISCONTINUED | OUTPATIENT
Start: 2018-03-31 | End: 2018-03-31 | Stop reason: HOSPADM

## 2018-03-31 RX ORDER — DEXAMETHASONE SODIUM PHOSPHATE 4 MG/ML
INJECTION, SOLUTION INTRA-ARTICULAR; INTRALESIONAL; INTRAMUSCULAR; INTRAVENOUS; SOFT TISSUE PRN
Status: DISCONTINUED | OUTPATIENT
Start: 2018-03-31 | End: 2018-03-31

## 2018-03-31 RX ORDER — PIPERACILLIN SODIUM, TAZOBACTAM SODIUM 3; .375 G/15ML; G/15ML
3.38 INJECTION, POWDER, LYOPHILIZED, FOR SOLUTION INTRAVENOUS EVERY 6 HOURS
Status: DISCONTINUED | OUTPATIENT
Start: 2018-03-31 | End: 2018-04-06 | Stop reason: HOSPADM

## 2018-03-31 RX ORDER — LIDOCAINE 40 MG/G
CREAM TOPICAL
Status: DISCONTINUED | OUTPATIENT
Start: 2018-03-31 | End: 2018-04-06 | Stop reason: HOSPADM

## 2018-03-31 RX ORDER — ONDANSETRON 4 MG/1
4 TABLET, ORALLY DISINTEGRATING ORAL EVERY 6 HOURS PRN
Status: DISCONTINUED | OUTPATIENT
Start: 2018-03-31 | End: 2018-04-06 | Stop reason: HOSPADM

## 2018-03-31 RX ORDER — MINERAL OIL, PETROLATUM 425; 573 MG/G; MG/G
OINTMENT OPHTHALMIC AT BEDTIME
COMMUNITY

## 2018-03-31 RX ORDER — PROCHLORPERAZINE MALEATE 5 MG
5 TABLET ORAL EVERY 6 HOURS PRN
Status: DISCONTINUED | OUTPATIENT
Start: 2018-03-31 | End: 2018-04-06 | Stop reason: HOSPADM

## 2018-03-31 RX ORDER — NEOSTIGMINE METHYLSULFATE 1 MG/ML
VIAL (ML) INJECTION PRN
Status: DISCONTINUED | OUTPATIENT
Start: 2018-03-31 | End: 2018-03-31

## 2018-03-31 RX ORDER — LIDOCAINE HYDROCHLORIDE 10 MG/ML
INJECTION, SOLUTION INFILTRATION; PERINEURAL PRN
Status: DISCONTINUED | OUTPATIENT
Start: 2018-03-31 | End: 2018-03-31

## 2018-03-31 RX ORDER — ASCORBIC ACID 500 MG
1000 TABLET ORAL DAILY
COMMUNITY

## 2018-03-31 RX ORDER — PROPRANOLOL HYDROCHLORIDE 80 MG/1
80 CAPSULE, EXTENDED RELEASE ORAL DAILY
COMMUNITY

## 2018-03-31 RX ORDER — GLYCOPYRROLATE 0.2 MG/ML
INJECTION, SOLUTION INTRAMUSCULAR; INTRAVENOUS PRN
Status: DISCONTINUED | OUTPATIENT
Start: 2018-03-31 | End: 2018-03-31

## 2018-03-31 RX ORDER — NALOXONE HYDROCHLORIDE 0.4 MG/ML
.1-.4 INJECTION, SOLUTION INTRAMUSCULAR; INTRAVENOUS; SUBCUTANEOUS
Status: ACTIVE | OUTPATIENT
Start: 2018-03-31 | End: 2018-04-01

## 2018-03-31 RX ORDER — ONDANSETRON 4 MG/1
4 TABLET, ORALLY DISINTEGRATING ORAL EVERY 30 MIN PRN
Status: DISCONTINUED | OUTPATIENT
Start: 2018-03-31 | End: 2018-03-31 | Stop reason: HOSPADM

## 2018-03-31 RX ORDER — HYDRALAZINE HYDROCHLORIDE 20 MG/ML
2.5-5 INJECTION INTRAMUSCULAR; INTRAVENOUS EVERY 10 MIN PRN
Status: DISCONTINUED | OUTPATIENT
Start: 2018-03-31 | End: 2018-03-31 | Stop reason: HOSPADM

## 2018-03-31 RX ORDER — ONDANSETRON 2 MG/ML
INJECTION INTRAMUSCULAR; INTRAVENOUS PRN
Status: DISCONTINUED | OUTPATIENT
Start: 2018-03-31 | End: 2018-03-31

## 2018-03-31 RX ORDER — OXYCODONE HYDROCHLORIDE 5 MG/1
5-10 TABLET ORAL EVERY 4 HOURS PRN
Status: DISCONTINUED | OUTPATIENT
Start: 2018-03-31 | End: 2018-04-03

## 2018-03-31 RX ORDER — SODIUM CHLORIDE, SODIUM LACTATE, POTASSIUM CHLORIDE, CALCIUM CHLORIDE 600; 310; 30; 20 MG/100ML; MG/100ML; MG/100ML; MG/100ML
INJECTION, SOLUTION INTRAVENOUS CONTINUOUS
Status: DISCONTINUED | OUTPATIENT
Start: 2018-03-31 | End: 2018-03-31 | Stop reason: HOSPADM

## 2018-03-31 RX ORDER — NALOXONE HYDROCHLORIDE 0.4 MG/ML
.1-.4 INJECTION, SOLUTION INTRAMUSCULAR; INTRAVENOUS; SUBCUTANEOUS
Status: DISCONTINUED | OUTPATIENT
Start: 2018-03-31 | End: 2018-04-06 | Stop reason: HOSPADM

## 2018-03-31 RX ORDER — ONDANSETRON 2 MG/ML
4 INJECTION INTRAMUSCULAR; INTRAVENOUS EVERY 30 MIN PRN
Status: DISCONTINUED | OUTPATIENT
Start: 2018-03-31 | End: 2018-03-31 | Stop reason: HOSPADM

## 2018-03-31 RX ORDER — MULTIVITAMIN,THERAPEUTIC
1 TABLET ORAL DAILY
COMMUNITY

## 2018-03-31 RX ORDER — SODIUM CHLORIDE, SODIUM LACTATE, POTASSIUM CHLORIDE, CALCIUM CHLORIDE 600; 310; 30; 20 MG/100ML; MG/100ML; MG/100ML; MG/100ML
INJECTION, SOLUTION INTRAVENOUS CONTINUOUS PRN
Status: DISCONTINUED | OUTPATIENT
Start: 2018-03-31 | End: 2018-03-31

## 2018-03-31 RX ORDER — HYDROMORPHONE HYDROCHLORIDE 1 MG/ML
.5-1 INJECTION, SOLUTION INTRAMUSCULAR; INTRAVENOUS; SUBCUTANEOUS
Status: DISCONTINUED | OUTPATIENT
Start: 2018-03-31 | End: 2018-03-31

## 2018-03-31 RX ORDER — PROPOFOL 10 MG/ML
INJECTION, EMULSION INTRAVENOUS CONTINUOUS PRN
Status: DISCONTINUED | OUTPATIENT
Start: 2018-03-31 | End: 2018-03-31

## 2018-03-31 RX ORDER — FENTANYL CITRATE 50 UG/ML
25-50 INJECTION, SOLUTION INTRAMUSCULAR; INTRAVENOUS
Status: DISCONTINUED | OUTPATIENT
Start: 2018-03-31 | End: 2018-03-31 | Stop reason: HOSPADM

## 2018-03-31 RX ORDER — MAGNESIUM HYDROXIDE 1200 MG/15ML
LIQUID ORAL PRN
Status: DISCONTINUED | OUTPATIENT
Start: 2018-03-31 | End: 2018-03-31 | Stop reason: HOSPADM

## 2018-03-31 RX ORDER — BUPROPION HYDROCHLORIDE 300 MG/1
TABLET ORAL EVERY MORNING
COMMUNITY

## 2018-03-31 RX ORDER — EPHEDRINE SULFATE 50 MG/ML
INJECTION, SOLUTION INTRAVENOUS PRN
Status: DISCONTINUED | OUTPATIENT
Start: 2018-03-31 | End: 2018-03-31

## 2018-03-31 RX ORDER — IOPAMIDOL 755 MG/ML
500 INJECTION, SOLUTION INTRAVASCULAR ONCE
Status: COMPLETED | OUTPATIENT
Start: 2018-03-31 | End: 2018-03-31

## 2018-03-31 RX ORDER — PROPOFOL 10 MG/ML
INJECTION, EMULSION INTRAVENOUS PRN
Status: DISCONTINUED | OUTPATIENT
Start: 2018-03-31 | End: 2018-03-31

## 2018-03-31 RX ORDER — SODIUM CHLORIDE 9 MG/ML
INJECTION, SOLUTION INTRAVENOUS CONTINUOUS
Status: DISCONTINUED | OUTPATIENT
Start: 2018-03-31 | End: 2018-03-31

## 2018-03-31 RX ORDER — ACETAMINOPHEN 325 MG/1
650 TABLET ORAL EVERY 4 HOURS PRN
Status: DISCONTINUED | OUTPATIENT
Start: 2018-04-03 | End: 2018-04-06 | Stop reason: HOSPADM

## 2018-03-31 RX ORDER — METOPROLOL TARTRATE 1 MG/ML
5 INJECTION, SOLUTION INTRAVENOUS EVERY 6 HOURS
Status: DISCONTINUED | OUTPATIENT
Start: 2018-04-01 | End: 2018-04-02

## 2018-03-31 RX ADMIN — SODIUM CHLORIDE, POTASSIUM CHLORIDE, SODIUM LACTATE AND CALCIUM CHLORIDE: 600; 310; 30; 20 INJECTION, SOLUTION INTRAVENOUS at 14:24

## 2018-03-31 RX ADMIN — FENTANYL CITRATE 50 MCG: 50 INJECTION, SOLUTION INTRAMUSCULAR; INTRAVENOUS at 16:55

## 2018-03-31 RX ADMIN — PROPOFOL 200 MG: 10 INJECTION, EMULSION INTRAVENOUS at 13:39

## 2018-03-31 RX ADMIN — PROPOFOL 50 MCG/KG/MIN: 10 INJECTION, EMULSION INTRAVENOUS at 13:48

## 2018-03-31 RX ADMIN — PIPERACILLIN SODIUM AND TAZOBACTAM SODIUM 3.38 G: 3; .375 INJECTION, POWDER, LYOPHILIZED, FOR SOLUTION INTRAVENOUS at 21:07

## 2018-03-31 RX ADMIN — SODIUM CHLORIDE 65 ML: 9 INJECTION, SOLUTION INTRAVENOUS at 11:12

## 2018-03-31 RX ADMIN — HYDROMORPHONE HYDROCHLORIDE 0.3 MG: 1 INJECTION, SOLUTION INTRAMUSCULAR; INTRAVENOUS; SUBCUTANEOUS at 16:45

## 2018-03-31 RX ADMIN — SODIUM CHLORIDE 2000 ML: 900 IRRIGANT IRRIGATION at 14:29

## 2018-03-31 RX ADMIN — HYDROMORPHONE HYDROCHLORIDE 0.5 MG: 1 INJECTION, SOLUTION INTRAMUSCULAR; INTRAVENOUS; SUBCUTANEOUS at 11:01

## 2018-03-31 RX ADMIN — ROCURONIUM BROMIDE 10 MG: 10 INJECTION INTRAVENOUS at 13:39

## 2018-03-31 RX ADMIN — SODIUM CHLORIDE, POTASSIUM CHLORIDE, SODIUM LACTATE AND CALCIUM CHLORIDE: 600; 310; 30; 20 INJECTION, SOLUTION INTRAVENOUS at 19:01

## 2018-03-31 RX ADMIN — PHENYLEPHRINE HYDROCHLORIDE 50 MCG: 10 INJECTION, SOLUTION INTRAMUSCULAR; INTRAVENOUS; SUBCUTANEOUS at 14:31

## 2018-03-31 RX ADMIN — TAZOBACTAM SODIUM AND PIPERACILLIN SODIUM 4.5 G: 500; 4 INJECTION, SOLUTION INTRAVENOUS at 12:12

## 2018-03-31 RX ADMIN — GLYCOPYRROLATE 0.5 MG: 0.2 INJECTION, SOLUTION INTRAMUSCULAR; INTRAVENOUS at 15:22

## 2018-03-31 RX ADMIN — SODIUM CHLORIDE: 9 INJECTION, SOLUTION INTRAVENOUS at 11:29

## 2018-03-31 RX ADMIN — LIDOCAINE HYDROCHLORIDE 50 MG: 10 INJECTION, SOLUTION INFILTRATION; PERINEURAL at 13:39

## 2018-03-31 RX ADMIN — Medication 4 MG: at 15:22

## 2018-03-31 RX ADMIN — EPHEDRINE SULFATE 5 MG: 50 INJECTION, SOLUTION INTRAVENOUS at 14:02

## 2018-03-31 RX ADMIN — PHENYLEPHRINE HYDROCHLORIDE 100 MCG: 10 INJECTION, SOLUTION INTRAMUSCULAR; INTRAVENOUS; SUBCUTANEOUS at 14:02

## 2018-03-31 RX ADMIN — ROCURONIUM BROMIDE 10 MG: 10 INJECTION INTRAVENOUS at 14:26

## 2018-03-31 RX ADMIN — DEXAMETHASONE SODIUM PHOSPHATE 4 MG: 4 INJECTION, SOLUTION INTRA-ARTICULAR; INTRALESIONAL; INTRAMUSCULAR; INTRAVENOUS; SOFT TISSUE at 13:41

## 2018-03-31 RX ADMIN — ROCURONIUM BROMIDE 40 MG: 10 INJECTION INTRAVENOUS at 13:53

## 2018-03-31 RX ADMIN — ONDANSETRON 4 MG: 2 INJECTION INTRAMUSCULAR; INTRAVENOUS at 14:42

## 2018-03-31 RX ADMIN — HYDROMORPHONE HYDROCHLORIDE: 10 INJECTION, SOLUTION INTRAMUSCULAR; INTRAVENOUS; SUBCUTANEOUS at 22:55

## 2018-03-31 RX ADMIN — EPHEDRINE SULFATE 5 MG: 50 INJECTION, SOLUTION INTRAVENOUS at 14:27

## 2018-03-31 RX ADMIN — Medication 100 MG: at 13:41

## 2018-03-31 RX ADMIN — SODIUM CHLORIDE, POTASSIUM CHLORIDE, SODIUM LACTATE AND CALCIUM CHLORIDE: 600; 310; 30; 20 INJECTION, SOLUTION INTRAVENOUS at 14:10

## 2018-03-31 RX ADMIN — SODIUM CHLORIDE 4000 ML: 900 IRRIGANT IRRIGATION at 14:25

## 2018-03-31 RX ADMIN — HYDROMORPHONE HYDROCHLORIDE 1 MG: 1 INJECTION, SOLUTION INTRAMUSCULAR; INTRAVENOUS; SUBCUTANEOUS at 13:53

## 2018-03-31 RX ADMIN — HYDROMORPHONE HYDROCHLORIDE: 10 INJECTION, SOLUTION INTRAMUSCULAR; INTRAVENOUS; SUBCUTANEOUS at 17:11

## 2018-03-31 RX ADMIN — ACETAMINOPHEN 975 MG: 325 TABLET, FILM COATED ORAL at 21:11

## 2018-03-31 RX ADMIN — ROCURONIUM BROMIDE 10 MG: 10 INJECTION INTRAVENOUS at 14:40

## 2018-03-31 RX ADMIN — SODIUM CHLORIDE: 9 INJECTION, SOLUTION INTRAVENOUS at 12:46

## 2018-03-31 RX ADMIN — IOPAMIDOL 100 ML: 755 INJECTION, SOLUTION INTRAVENOUS at 11:11

## 2018-03-31 RX ADMIN — FENTANYL CITRATE 100 MCG: 50 INJECTION, SOLUTION INTRAMUSCULAR; INTRAVENOUS at 13:37

## 2018-03-31 ASSESSMENT — ACTIVITIES OF DAILY LIVING (ADL)
RETIRED_EATING: 0-->INDEPENDENT
SWALLOWING: 0-->SWALLOWS FOODS/LIQUIDS WITHOUT DIFFICULTY
BATHING: 0-->INDEPENDENT
TOILETING: 0-->INDEPENDENT
RETIRED_COMMUNICATION: 0-->UNDERSTANDS/COMMUNICATES WITHOUT DIFFICULTY
TRANSFERRING: 0-->INDEPENDENT
COGNITION: 0 - NO COGNITION ISSUES REPORTED
FALL_HISTORY_WITHIN_LAST_SIX_MONTHS: NO
ADLS_ACUITY_SCORE: 17
AMBULATION: 0-->INDEPENDENT
DRESS: 0-->INDEPENDENT

## 2018-03-31 ASSESSMENT — ENCOUNTER SYMPTOMS
DIARRHEA: 0
BLOOD IN STOOL: 0
FEVER: 0
VOMITING: 0
ABDOMINAL PAIN: 1

## 2018-03-31 ASSESSMENT — PAIN DESCRIPTION - DESCRIPTORS: DESCRIPTORS: ACHING

## 2018-03-31 NOTE — IP AVS SNAPSHOT
MRN:[id number]                      After Visit Summary   3/31/2018    Christina Cotto    MRN: [id number]           Thank you!     Thank you for choosing [facility] for your care. Our goal is always to provide you with excellent care. Hearing back from our patients is one way we can continue to improve our services. Please take a few minutes to complete the written survey that you may receive in the mail after you visit. If you would like to speak to someone directly about your visit please contact Patient Relations at [phone]. Thank you!          Patient Information     Date Of Birth          1950        Designated Caregiver       Most Recent Value    Caregiver    Will someone help with your care after discharge? yes    Name of designated caregiver daughters    Phone number of caregiver .    Caregiver address .      About your hospital stay     You were admitted on:  March 31, 2018 You last received care in the:  [facility] [facility] [address] [facility]    You were discharged on:  April 6, 2018        Reason for your hospital stay       Perforated diverticulitis                  Who to Call     For medical emergencies, please call 911.  For non-urgent questions about your medical care, please call your primary care provider or clinic, [phone]  For questions related to your surgery, please call your surgery clinic        Attending Provider     Provider Specialty    Malcom Garvey MD Emergency Medicine    Anthony Heredia MD Emergency Medicine    Rodrigo Claire MD General Surgery       Primary Care Provider Office Phone # Fax #    Ike Caldwell [phone]      After Care Instructions     Diet       Follow this diet upon discharge:       Low Fiber Diet                  Follow-up Appointments     Follow-up and recommended labs and tests        With Dr Newman in clinic in 1 week.                  Additional Services     Home care nursing  referral       RN skilled nursing visit. RN to assess incision for signs/symptoms of infection.  RN to teach colostomy management.    Your provider has ordered home care nursing services. If you have not been contacted within 2 days of your discharge please call the inpatient department phone number at [phone] .                  Further instructions from your care team       CM: [place] [facility] [place] P: [phone] Fax [phone]    HOME CARE FOLLOWING ABDOMINAL SURGERY  GEOFF Jolly, KARLA Shin, NARDA Shelley    INCISIONAL CARE:  Replace the bandage over your incision (or incisions) until all drainage stops.  If present, leave the steri-strips (white paper tapes) in place for 14 days after surgery.  If you have staples in your incision at the time of discharge, they will be removed at your follow-up appointment.  If Dermabond (a type of skin glue) is present, leave in place until it wears/flakes off.  Change the appliance and care for the ostomy as instructed by the inpatient ostomy nurse.    BATHING:  Avoid baths for 1 week after surgery.  Showers are okay.  You may wash your hair at any time.  Gently pat your incision dry after bathing.    ACTIVITY:  Light Activity -- you may immediately be up and about as tolerated.  Driving -- you may drive when comfortable and off narcotic pain medications.  Light Work -- resume when comfortable off pain medications.  (If you can drive, you probably can work.)  Strenuous Work/Activity -- limit lifting to 20 pounds for 4 weeks.  Then, progressively increase with time.  Active Sports (running, biking, etc.) -- cautiously resume after 6 weeks.    DISCOMFORT:  Use pain medications as prescribed by your surgeon.  Take the pain medication with some food, when possible, to minimize side effects.  Expect gradual improvement.    DIET:  Return to diet you were on before surgery, unless you are given specific diet instructions.  Drink plenty of  fluids.  While taking pain medications, increase dietary fiber or add a fiber supplementation like Metamucil or Citrucel to help prevent constipation - a possible side effect of pain medications.    NAUSEA:  If nauseated from the anesthetic/pain meds; rest in bed, get up cautiously with assistance, and drink clear liquids (juice, tea, broth).    RETURN APPOINTMENT:  Schedule a follow-up visit 2-3 weeks after discharge from the hospital.  Office Phone:  [phone]     CONTACT US IF THE FOLLOWING DEVELOPS:   1. A fever that is above 101     2. If there is a large amount of drainage, bleeding, or swelling.   3. Severe pain that is not relieved by your prescription.   4. Drainage that is thick, cloudy, yellow, green or white.   5. Any other questions not answered by  Frequently Asked Questions  sheet.      FREQUENTLY ASKED QUESTIONS:    Q:  How should my incision look?    A:  Normally your incision will appear slightly swollen with light redness directly along the incision itself as it heals.  It may feel like a bump or ridge as the healing/scarring happens, and over time (3-4 months) this bump or ridge feeling should slowly go away.  In general, clear or pink watery drainage can be normal at first as your incision heals, but should decrease over time.    Q:  How do I know if my incision is infected?  A:  Look at your incision for signs of infection, like redness around the incision spreading to surrounding skin, or drainage of cloudy or foul-smelling drainage.  If you feel warm, check your temperature to see if you are running a fever.    **If any of these things occur, please notify the nurse at our office.  We may need you to come into the office for an incision check.      Q:  How do I take care of my incision?  A:  If you have a dressing in place - Starting the day after surgery, replace the dressing 1-2 times a day until there is no further drainage from the incision.  At that time, a dressing is no longer  needed.  Try to minimize tape on the skin if irritation is occurring at the tape sites.  If you have significant irritation from tape on the skin, please call the office to discuss other method of dressing your incision.    Small pieces of tape called  steri-strips  may be present directly overlying your incision; these may be removed 10 days after surgery unless otherwise specified by your surgeon.  If these tapes start to loosen at the ends, you may trim them back until they fall off or are removed.    A:  If you had  Dermabond  tissue glue used as a dressing (this causes your incision to look shiny with a clear covering over it) - This type of dressing wears off with time and does not require more dressings over the top unless it is draining around the glue as it wears off.  Do not apply ointments or lotions over the incisions until the glue has completely worn off.    Q:  There is a piece of tape or a sticky  lead  still on my skin.  Can I remove this?  A:  Sometimes the sticky  leads  used for monitoring during surgery or for evaluation in the emergency department are not all removed while you are in the hospital.  These sometimes have a tab or metal dot on them.  You can easily remove these on your own, like taking off a band-aid.  If there is a gel substance under the  lead , simply wipe/clean it off with a washcloth or paper towel.      Q:  What can I do to minimize constipation (very hard stools, or lack of stools)?  A:  Stay well hydrated.  Increase your dietary fiber intake or take a fiber supplement -with plenty of water.  Walk around frequently.  You may consider an over-the-counter stool-softener.  Your Pharmacist can assist you with choosing one that is stocked at your pharmacy.  Constipation is also one of the most common side effects of pain medication.  If you are using pain medication, be pro-active and try to PREVENT problems with constipation by taking the steps above BEFORE constipation becomes  a problem.    Q:  What do I do if I need more pain medications?  A:  Call the office to receive refills.  Be aware that certain pain meds cannot be called into a pharmacy and actually require a paper prescription.  A change may be made in your pain med as you progress thru your recovery period or if you have side effects to certain meds.    --Pain meds are NOT refilled after 5pm on weekdays, and NOT AT ALL on the weekends, so please look ahead to prevent problems.      Q:  Why am I having a hard time sleeping now that I am at home?  A:  Many medications you receive while you are in the hospital can impact your sleep for a number of days after your surgery/hospitalization.  Decreased level of activity and naps during the day may also make sleeping at night difficult.  Try to minimize day-time naps, and get up frequently during the day to walk around your home during your recovery time.  Sleep aides may be of some help, but are not recommended for long-term use.      Q:  I am having some back discomfort.  What should I do?  A:  This may be related to certain positioning that was required for your surgery, extended periods of time in bed, or other changes in your overall activity level.  You may try ice, heat, acetaminophen, or ibuprofen to treat this temporarily.  Note that many pain medications have acetaminophen in them and would state this on the prescription bottle.  Be sure not to exceed the maximum of 4000mg per day of acetaminophen.     **If the pain you are having does not resolve, is severe, or is a flare of back pain you have had on other occasions prior to surgery, please contact your primary physician for further recommendations or for an appointment to be examined at their office.    Q:  Why am I having headaches?  A:  Headaches can be caused by many things:  caffeine withdrawal, use of pain meds, dehydration, high blood pressure, lack of sleep, over-activity/exhaustion, flare-up of usual migraine  headaches.  If you feel this is related to muscle tension (a band-like feeling around the head, or a pressure at the low-back of the head) you may try ice or heat to this area.  You may need to drink more fluids (try electrolyte drink like Gatorade), rest, or take your usual migraine medications.   **If your headaches do not resolve, worsen, are accompanied by other symptoms, or if your blood pressure is high, please call your primary physician for recommendation and/or examination.    Q:  I am unable to urinate.  What do I do?  A:  A small percentage of people can have difficulty urinating initially after surgery.  This includes being able to urinate only a very small amount at a time and feeling discomfort or pressure in the very low abdomen.  This is called  urinary retention , and is actually an urgent situation.  Proceed to your nearest Emergency department for evaluation (not an Urgent Care Center).  Sometimes the bladder does not work correctly after certain medications you receive during surgery, or related to certain procedures.  You may need to have a catheter placed until your bladder recovers.  When planning to go to an Emergency department, it may help to call the ER to let them know you are coming in for this problem after a surgery.  This may help you get in quicker to be evaluated.  **If you have symptoms of a urinary tract infection, please contact your primary physician for the proper evaluation and treatment.          If you have other questions, please call the office Monday thru Friday between 8am and 5pm to discuss with the nurse or physician assistant.  [phone]    There is a surgeon ON CALL on weekday evenings and over the weekend in case of urgent need only, and may be contacted at the same number.    If you are having an emergency, call 911 or proceed to your nearest emergency department.        Pending Results     Date and Time Order Name Status Description    3/31/2018 1405  "Anaerobic bacterial culture Preliminary             Statement of Approval     Ordered          18 0937  I have reviewed and agree with all the recommendations and orders detailed in this document.  EFFECTIVE NOW     Approved and electronically signed by:  Rodrigo Claire MD             Admission Information     Date & Time Provider Department Dept. Phone    3/31/2018 Rodrigo Claire MD [place] [address] [facility] [phone]      Your Vitals Were     Blood Pressure Pulse Temperature Respirations Height Weight    140/57 (BP Location: Left arm) 56 98.6  F (37  C) (Oral) 18 1.626 m (5' 4\") 108.9 kg (240 lb 1.6 oz)    Pulse Oximetry BMI (Body Mass Index)                95% 41.21 kg/m2          MyChart Information     [facility] lets you send messages to your doctor, view your test results, renew your prescriptions, schedule appointments and more. To sign up, go to [email or web address] . Click on \"Log in\" on the left side of the screen, which will take you to the Welcome page. Then click on \"Sign up Now\" on the right side of the page.     You will be asked to enter the access code listed below, as well as some personal information. Please follow the directions to create your username and password.     Your access code is: [email or web address]  Expires: 2018  1:29 PM     Your access code will  in 90 days. If you need help or a new code, please call your [facility] clinic or [phone].        Care EveryWhere ID     This is your Care EveryWhere ID. This could be used by other organizations to access your [facility] medical records  [id number]        Equal Access to Services     [facility] HospitalGLENN : Hadii duncan Fletcher, waaxda matt, qaybjovan faithaljanice davison. So [facility] [phone].    ATENCIÓN: Si habla español, tiene a mariscal disposición servicios gratuitos de asistencia lingüística. Llame al [phone].    We comply with applicable federal " civil rights laws and [place] laws. We do not discriminate on the basis of race, color, national origin, age, disability, sex, sexual orientation, or gender identity.               Review of your medicines      START taking        Dose / Directions    amoxicillin-clavulanate 875-125 MG per tablet   Commonly known as:  AUGMENTIN        Dose:  1 tablet   Take 1 tablet by mouth 2 times daily for 7 days   Quantity:  14 tablet   Refills:  0       ondansetron 4 MG ODT tab   Commonly known as:  ZOFRAN ODT        Dose:  4-8 mg   Take 1-2 tablets (4-8 mg) by mouth 3 times daily (before meals)   Quantity:  20 tablet   Refills:  1       oxyCODONE HCl 5 MG Taba   Commonly known as:  OXAYDO        Dose:  1-2 tablet   Take 1-2 tablets by mouth every 4 hours as needed (pain)   Quantity:  15 each   Refills:  0         CONTINUE these medicines which have NOT CHANGED        Dose / Directions    ALDACTONE PO        Dose:  50 mg   Take 50 mg by mouth daily   Refills:  0       ascorbic acid 500 MG Tabs        Dose:  1000 mg   Take 1,000 mg by mouth daily   Refills:  0       aspirin 81 MG chewable tablet        Dose:  81 mg   Take 81 mg by mouth daily   Refills:  0       buPROPion 300 MG 24 hr tablet   Commonly known as:  WELLBUTRIN XL        Take by mouth every morning   Refills:  0       Calcium carb-Vitamin D 500 mg [place]-200 units 500-200 MG-UNIT per tablet   Commonly known as:  OSCAL with D;Oyster Shell Calcium        Dose:  1 tablet   Take 1 tablet by mouth daily   Refills:  0       CYCLOBENZAPRINE HCL PO        Dose:  5 mg   Take 5 mg by mouth daily as needed   Refills:  0       LEVOTHYROXINE SODIUM PO        Dose:  100 mcg   Take 100 mcg by mouth daily   Refills:  0       multivitamin, therapeutic Tabs tablet        Dose:  1 tablet   Take 1 tablet by mouth daily   Refills:  0       polyethylene glycol 0.4%- propylene glycol 0.3% 0.4-0.3 % Soln ophthalmic solution   Commonly known as:  SYSTANE ULTRA        Dose:  1 drop   Place 1  drop into both eyes 4 times daily as needed for dry eyes   Refills:  0       PRILOSEC PO        Dose:  20 mg   Take 20 mg by mouth every morning   Refills:  0       propranolol 80 MG 24 hr capsule   Commonly known as:  INDERAL LA        Dose:  80 mg   Take 80 mg by mouth daily For tremor   Refills:  0       REFRESH P.M. Oint        Apply to eye At Bedtime   Refills:  0       SYSTANE BALANCE 0.6 % Soln ophthalmic solution   Generic drug:  propylene glycol        Dose:  1 drop   Place 1 drop into both eyes 4 times daily as needed for dry eyes   Refills:  0       VIIBRYD PO        Dose:  40 mg   Take 40 mg by mouth daily   Refills:  0       XANAX PO        Dose:  0.25 mg   Take 0.25 mg by mouth 3 times daily as needed   Refills:  0         STOP taking     polyethylene glycol 236 G suspension   Commonly known as:  GoLYTELY/NuLYTELY                Where to get your medicines      These medications were sent to [facility] [address] [facility]  [address] [facility] [address]     Phone:  [phone]     amoxicillin-clavulanate 875-125 MG per tablet    ondansetron 4 MG ODT tab         Some of these will need a paper prescription and others can be bought over the counter. Ask your nurse if you have questions.     Bring a paper prescription for each of these medications     oxyCODONE HCl 5 MG Taba                Protect others around you: Learn how to safely use, store and throw away your medicines at www.disposemymeds.org.        ANTIBIOTIC INSTRUCTION     You've Been Prescribed an Antibiotic - Now What?  Your healthcare team thinks that you or your loved one might have an infection. Some infections can be treated with antibiotics, which are powerful, life-saving drugs. Like all medications, antibiotics have side effects and should only be used when necessary. There are some important things you should know about your antibiotic treatment.      Your healthcare team may run  tests before you start taking an antibiotic.    Your team may take samples (e.g., from your blood, urine or other areas) to run tests to look for bacteria. These test can be important to determine if you need an antibiotic at all and, if you do, which antibiotic will work best.      Within a few days, your healthcare team might change or even stop your antibiotic.    Your team may start you on an antibiotic while they are working to find out what is making you sick.    Your team might change your antibiotic because test results show that a different antibiotic would be better to treat your infection.    In some cases, once your team has more information, they learn that you do not need an antibiotic at all. They may find out that you don't have an infection, or that the antibiotic you're taking won't work against your infection. For example, an infection caused by a virus can't be treated with antibiotics. Staying on an antibiotic when you don't need it is more likely to be harmful than helpful.      You may experience side effects from your antibiotic.    Like all medications, antibiotics have side effects. Some of these can be serious.    Let you healthcare team know if you have any known allergies when you are admitted to the hospital.    One significant side effect of nearly all antibiotics is the risk of severe and sometimes deadly diarrhea caused by Clostridium difficile (C. Difficile). This occurs when a person takes antibiotics because some good germs are destroyed. Antibiotic use allows C. diificile to take over, putting patients at high risk for this serious infection.    As a patient or caregiver, it is important to understand your or your loved one's antibiotic treatment. It is especially important for caregivers to speak up when patients can't speak for themselves. Here are some important questions to ask your healthcare team.    What infection is this antibiotic treating and how do you know I have that  infection?    What side effects might occur from this antibiotic?    How long will I need to take this antibiotic?    Is it safe to take this antibiotic with other medications or supplements (e.g., vitamins) that I am taking?     Are there any special directions I need to know about taking this antibiotic? For example, should I take it with food?    How will I be monitored to know whether my infection is responding to the antibiotic?    What tests may help to make sure the right antibiotic is prescribed for me?      Information provided by:  www.cdc.gov/getsmart  U.S. Department of Health and Human Services  Centers for disease Control and Prevention  National Center for Emerging and Zoonotic Infectious Diseases  Division of Healthcare Quality Promotion        Information about OPIOIDS     PRESCRIPTION OPIOIDS: WHAT YOU NEED TO KNOW    Prescription opioids can be used to help relieve moderate to severe pain and are often prescribed following a surgery or injury, or for certain health conditions. These medications can be an important part of treatment but also come with serious risks. It is important to work with your health care provider to make sure you are getting the safest, most effective care.    WHAT ARE THE RISKS AND SIDE EFFECTS OF OPIOID USE?  Prescription opioids carry serious risks of addiction and overdose, especially with prolonged use. An opioid overdose, often marked by slowed breathing can cause sudden death. The use of prescription opioids can have a number of side effects as well, even when taken as directed:      Tolerance - meaning you might need to take more of a medication for the same pain relief    Physical dependence - meaning you have symptoms of withdrawal when a medication is stopped    Increased sensitivity to pain    Constipation    Nausea, vomiting, and dry mouth    Sleepiness and dizziness    Confusion    Depression    Low levels of testosterone that can result in lower sex drive,  energy, and strength    Itching and sweating    RISKS ARE GREATER WITH:    History of drug misuse, substance use disorder, or overdose    Mental health conditions (such as depression or anxiety)    Sleep apnea    Older age (65 years or older)    Pregnancy    Avoid alcohol while taking prescription opioids.   Also, unless specifically advised by your health care provider, medications to avoid include:    Benzodiazepines (such as Xanax or Valium)    Muscle relaxants (such as Soma or Flexeril)    Hypnotics (such as Ambien or Lunesta)    Other prescription opioids    KNOW YOUR OPTIONS:  Talk to your health care provider about ways to manage your pain that do not involve prescription opioids. Some of these options may actually work better and have fewer risks and side effects:    Pain relievers such as acetaminophen, ibuprofen, and naproxen    Some medications that are also used for depression or seizures    Physical therapy and exercise    Cognitive behavioral therapy, a psychological, goal-directed approach, in which patients learn how to modify physical, behavioral, and emotional triggers of pain and stress    IF YOU ARE PRESCRIBED OPIOIDS FOR PAIN:    Never take opioids in greater amounts or more often than prescribed    Follow up with your primary health care provider and work together to create a plan on how to manage your pain.    Talk about ways to help manage your pain that do not involve prescription opioids    Talk about all concerns and side effects    Help prevent misuse and abuse    Never sell or share prescription opioids    Never use another person's prescription opioids    Store prescription opioids in a secure place and out of reach of others (this may include visitors, children, friends, and family)    Visit www.cdc.gov/drugoverdose to learn about risks of opioid abuse and overdose    If you believe you may be struggling with addiction, tell your health care provider and ask for guidance or call  [facility]'s National Helpline at 3-451-715-HELP    LEARN MORE / www.cdc.gov/drugoverdose/prescribing/guideline.html    Safely dispose of unused prescription opioids: Find your local drug take-back programs and more information about the importance of safe disposal at www.doseofreality.mn.gov             Medication List: This is a list of all your medications and when to take them. Check marks below indicate your daily home schedule. Keep this list as a reference.      Medications           Morning Afternoon Evening Bedtime As Needed    ALDACTONE PO   Take 50 mg by mouth daily   Last time this was given:  50 mg on 4/6/2018  9:00 AM                                amoxicillin-clavulanate 875-125 MG per tablet   Commonly known as:  AUGMENTIN   Take 1 tablet by mouth 2 times daily for 7 days                                ascorbic acid 500 MG Tabs   Take 1,000 mg by mouth daily                                aspirin 81 MG chewable tablet   Take 81 mg by mouth daily                                buPROPion 300 MG 24 hr tablet   Commonly known as:  WELLBUTRIN XL   Take by mouth every morning   Last time this was given:  300 mg on 4/6/2018  8:59 AM                                Calcium carb-Vitamin D 500 mg [place]-200 units 500-200 MG-UNIT per tablet   Commonly known as:  OSCAL with D;Oyster Shell Calcium   Take 1 tablet by mouth daily                                CYCLOBENZAPRINE HCL PO   Take 5 mg by mouth daily as needed                                LEVOTHYROXINE SODIUM PO   Take 100 mcg by mouth daily   Last time this was given:  100 mcg on 4/6/2018  8:59 AM                                multivitamin, therapeutic Tabs tablet   Take 1 tablet by mouth daily                                ondansetron 4 MG ODT tab   Commonly known as:  ZOFRAN ODT   Take 1-2 tablets (4-8 mg) by mouth 3 times daily (before meals)   Last time this was given:  4 mg on 4/3/2018  1:06 AM                                oxyCODONE HCl 5 MG [place]    Commonly known as:  OXAYDO   Take 1-2 tablets by mouth every 4 hours as needed (pain)                                polyethylene glycol 0.4%- propylene glycol 0.3% 0.4-0.3 % Soln ophthalmic solution   Commonly known as:  SYSTANE ULTRA   Place 1 drop into both eyes 4 times daily as needed for dry eyes                                PRILOSEC PO   Take 20 mg by mouth every morning   Last time this was given:  20 mg on 4/6/2018  6:15 AM                                propranolol 80 MG 24 hr capsule   Commonly known as:  INDERAL LA   Take 80 mg by mouth daily For tremor   Last time this was given:  80 mg on 4/6/2018  8:59 AM                                REFRESH P.M. Oint   Apply to eye At Bedtime   Last time this was given:  4/5/2018  8:59 PM                                SYSTANE BALANCE 0.6 % Soln ophthalmic solution   Place 1 drop into both eyes 4 times daily as needed for dry eyes   Generic drug:  propylene glycol                                VIIBRYD PO   Take 40 mg by mouth daily   Last time this was given:  40 mg on 4/6/2018  8:59 AM                                XANAX PO   Take 0.25 mg by mouth 3 times daily as needed   Last time this was given:  0.25 mg on 4/3/2018  7:17 PM

## 2018-03-31 NOTE — ED NOTES
Hx of diverticulitis, last episode end of February.  Abdominal pain since last week, worse since Thursday.  ABCDs intact.

## 2018-03-31 NOTE — ED PROVIDER NOTES
History     Chief Complaint:  Abdominal Pain    HPI   Christina Cotto is a 68 year old female with a history of diverticulitis who presents to the ED for evaluation of left sided abdominal pain. The patient states that she has had approximately 2-3 weeks of bilateral lower abdominal pain, left greater than right, and chronic constipation. She has been using OTC laxative for this issue, with little relief. Of note, the patient was seen here in the ED for these symptoms yesterday, at which time she had blood work and imaging (see results below) as she felt her symptoms were somewhat similar to her past bouts of diverticulitis. She was then discharged with a prescription for Cipro and Flagyl. However, since her last visit, the patient's pain has worsened, especially this morning. This prompted her return to the ED this morning.  The patient denies any recent vomiting, diarrhea, black or bloody stool, or fever.    CT Abdomen/Pelvis with IV contrast 03/30/2018:   1. Colonic diverticulosis with sigmoid pericolonic inflammatory  stranding suggesting diverticulitis.  2. Right adrenal stable/benign 3.0 x 2.5 cm nodule.  3. Hepatic fatty infiltration--possible etiologies include consumption  of alcohol or excessive carbohydrate intake, especially  sugar/fructose.  Metabolic syndrome commonly occurs in combination  with nonalcoholic fatty liver disease. Although often reversible,  nonalcoholic fatty liver disease can progress to steatohepatitis and  cirrhosis. As per radiology.     Laboratory Evaluation 03/30/2018:  BMP: Glucose 155 (H), o/w WNL (Creatinine: 0.80)  CBC: WBC: 24.7 (H), HGB: 13.5, PLT: 307     Allergies:  No Known Allergies     Medications:    Viibryd  Synthroid  Bupropion  Cyclobenzaprine  Xanax  Asprin  Prilosec  Aldactone  Flomax  Cipro   Flagyl    Past Medical History:    Thyroid disease  Chronic constipation  GERD  Diverticulitis  Depression    Past Surgical History:    The patient does not have any  "pertinent past surgical history.    Family History:    No past pertinent family history.    Social History:  Presents alone.   Marital Status:   [2]    Review of Systems   Constitutional: Negative for fever.   Gastrointestinal: Positive for abdominal pain. Negative for blood in stool, diarrhea and vomiting.   All other systems reviewed and are negative.    Physical Exam     Patient Vitals for the past 24 hrs:   BP Temp Temp src Pulse Heart Rate Resp SpO2 Height Weight   03/31/18 1253 115/54 98.9  F (37.2  C) Temporal - - 16 97 % - -   03/31/18 1211 - - - - - - - 1.626 m (5' 4\") 108.9 kg (240 lb)   03/31/18 1200 120/57 - - - - - - - -   03/31/18 1130 116/56 - - 67 - - 91 % - -   03/31/18 1103 110/56 99  F (37.2  C) Oral 68 68 20 94 % - -     Physical Exam  General: Patient is alert and interactive when I enter the room  Head:  The scalp, face, and head appear normal  Eyes:  The pupils are equal, round, and reactive to light    Conjunctivae and sclerae are normal  ENT:    External acoustic canals are normal    The oropharynx is normal without erythema.     Uvula is in the midline  Neck:  Normal range of motion  CV:  Regular rate. S1/S2. No murmurs.   Resp:  Lungs are clear without wheezes or rales. No distress  GI:  Abdomen is soft, no rigidity, guarding, or rebound    No distension. Left lower quadrant tenderness to palpation.    No hernia. No bruising to the abdomen.   MS:  Normal tone. Joints grossly normal without effusions.     No asymmetric leg swelling, calf or thigh tenderness.      Normal motor assessment of all extremities.  Skin:  No rash or lesions noted. Normal capillary refill noted  Neuro:  Speech is normal and fluent. Face is symmetric.     Moving all extremities well.   Psych:  Awake. Alert.  Normal affect.  Appropriate interactions.  Lymph: No anterior cervical lymphadenopathy noted    Emergency Department Course   ECG:  ECG (12:28:06):  Rate 66 bpm. ME interval 180. QRS duration 92. QT/QTc " 378/396. P-R-T axes 58 8 31. Normal Sinus Rhythm, Normal ECG Interpreted at 1230 by Anthony Heredia MD.    Imaging:  Radiographic findings were communicated with the patient who voiced understanding of the findings.    CT Abdomen/Pelvis with contrast:   Perforated sigmoid diverticulitis with small collections  of free air in the upper and mid abdomen as well as small areas of  abscess formation adjacent the sigmoid colon, the largest measuring  3.5 cm. The free air and abscess formation is new compared to  yesterday. As per radiology.    Laboratory:  CBC: WBC: 20.8(H), HGB: 13.0, PLT: 285  BMP: Glucose 134(H), o/w WNL (Creatinine: 0.82)    Interventions:  1015 NS 1L IV  1058 Dilaudid 0.5 mg IV  1110 NS 1L IV  1157 Zosyn 4.5 g IV    Emergency Department Course:  Nursing notes and vitals reviewed. 1022 I performed an exam of the patient as documented above.     EKG obtained in the ED, see results above.     IV inserted. Medicine administered as documented above. Blood drawn. This was sent to the lab for further testing, results above.    The patient was sent for a CT Abdomen Pelvis while in the emergency department, findings above.     1203 I consulted with Dr. Newman, general surgery, regarding the patient's history and presentation here in the emergency department.  He has agreed to accept the patient for admission.     1212 I rechecked the patient and discussed the results of her workup thus far.     Findings and plan explained to the Patient who consents to admission. Discussed the patient with Dr. Newman, who will admit the patient to a surgical bed for further monitoring, evaluation, and treatment.    Impression & Plan    Medical Decision Making:  Presents with worsening left quadrant pain and she had a CT yesterday without perforation and just diverticulitis.  Today she has a perforation with free air, she'll go to the operating room with Dr. Newman.  There is no sign of sepsis at this point.   She will receive Zosyn right now.  Stable for admission.    Diagnosis:    ICD-10-CM   1. Perforated bowel (H) K63.1   2. Acute diverticulitis of intestine K57.92       Disposition:  Admitted to Dr. Newman, general surgery    3/31/2018   [facility] EMERGENCY DEPARTMENT  Agustin RODRIGUEZ, mike serving as a scribe at 10:22 AM on 3/31/2018 to document services personally performed by Anthony Heredia MD based on my observations and the provider's statements to me.        Anthony Heredia MD  04/04/18 9207

## 2018-03-31 NOTE — ANESTHESIA PREPROCEDURE EVALUATION
Anesthesia Evaluation     . Pt has had prior anesthetic. Type: General    History of anesthetic complications   - PONV        ROS/MED HX    ENT/Pulmonary:  - neg pulmonary ROS     Neurologic:     (+)other neuro tremors    Cardiovascular:  - neg cardiovascular ROS       METS/Exercise Tolerance:     Hematologic:  - neg hematologic  ROS       Musculoskeletal:  - neg musculoskeletal ROS       GI/Hepatic:     (+) Other GI/Hepatic diverticulitis with possible perforation      Renal/Genitourinary:  - ROS Renal section negative       Endo:     (+) Obesity, .      Psychiatric:  - neg psychiatric ROS       Infectious Disease:  - neg infectious disease ROS       Malignancy:      - no malignancy   Other:    - neg other ROS                 Physical Exam  Normal systems: cardiovascular, pulmonary and dental    Airway   Mallampati: III  TM distance: >3 FB  Neck ROM: full    Dental     Cardiovascular       Pulmonary                     Anesthesia Plan      History & Physical Review  History and physical reviewed and following examination; no interval change.    ASA Status:  2 .    NPO Status:  > 8 hours    Plan for General and ETT with Intravenous and Propofol induction. Maintenance will be Balanced.    PONV prophylaxis:  Ondansetron (or other 5HT-3) and Dexamethasone or Solumedrol       Postoperative Care  Postoperative pain management:  IV analgesics.      Consents  Anesthetic plan, risks, benefits and alternatives discussed with:  Patient or representative..                          .

## 2018-03-31 NOTE — ED NOTES
Orders in place for patient to transfer to OR. Report given to PACU RN for continuity of care. Patient informed of planned transfer. Belongings collected and transferred with patient. Per patient, all belongings with patient at time of transfer.

## 2018-03-31 NOTE — IP AVS SNAPSHOT
[place] [id number] [facility]    [address]    [facility] [address]    Phone:  [phone]    Fax:  [phone]                                       After Visit Summary   3/31/2018    Christina Cotto    MRN: [id number]           After Visit Summary Signature Page     I have received my discharge instructions, and my questions have been answered. I have discussed any challenges I see with this plan with the nurse or doctor.    ..........................................................................................................................................  Patient/Patient Representative Signature      ..........................................................................................................................................  Patient Representative Print Name and Relationship to Patient    ..................................................               ................................................  Date                                            Time    ..........................................................................................................................................  Reviewed by Signature/Title    ...................................................              ..............................................  Date                                                            Time

## 2018-03-31 NOTE — ANESTHESIA CARE TRANSFER NOTE
Patient: Christina Cotto    Procedure(s):  LAPAROTOMY EXPLORATORY, SIGMOID COLECTOMY, COLOSTOMY , Peritoneal Washings - Wound Class: IV-Dirty or Infected   - Wound Class: IV-Dirty or Infected    Diagnosis: free air in abdominal area  Diagnosis Additional Information: No value filed.    Anesthesia Type:   General, ETT     Note:  Airway :ETT  Patient transferred to:PACU  Comments: VSS. Spontaneous respirations. FZ=943. R=18. O2 sat=96%. HOB up 45 degrees. To PACU on T-piece. Report to RN.      Vitals: (Last set prior to Anesthesia Care Transfer)    CRNA VITALS  3/31/2018 1509 - 3/31/2018 1546      3/31/2018             Pulse: 62    SpO2: 94 %    Resp Rate (observed): 17                Electronically Signed By: TRAV Peralta CRNA  March 31, 2018  3:46 PM

## 2018-03-31 NOTE — ED NOTES
Returned patient phone call at 0915 on 3/31/18, patient reports she continues to have severe abdominal pain and is unable to have BM. Instructed patient to return to ED for re-evaluation if needed.

## 2018-03-31 NOTE — CONSULTS
Consult Date:  03/31/2018      REASON FOR CONSULTATION:  Complicated diverticulitis with pneumoperitoneum.      HISTORY OF PRESENT ILLNESS:  Ms. Cotto is a 68-year-old female who has had multiple bouts of diverticulitis in the past, more than she can quantify who came to the ER today with worsening abdominal pain.  She states that she actually recently completed a course of antibiotics for diverticulitis about 10 days ago and started having low belly pain yesterday.  She was actually seen in the ER and thought to have constipation initially during evaluation yesterday.  Subsequent CAT scan showed diverticulitis without overt complication.  Because of persistent worsening pain, the patient returned to the ER today and was found now to have free air on repeat CT scan.  She was seen and evaluated in preop after arrangements for surgical intervention were already made.      PAST MEDICAL AND SURGICAL HISTORY:  Notable for an open appendectomy as well as open hysterectomy and prior tubal ligation.  She has GERD, constipation, known diverticulitis, depression, and thyroid disease.      CURRENT MEDICATIONS:  Viibryd, Synthroid, bupropion, cyclobenzaprine, Xanax, aspirin, Prilosec, Aldactone, Flomax, Cipro and Flagyl.  The last 2 for recent bout of diverticulitis.      SOCIAL HISTORY:  The patient is .  She is here with her , daughter and son-in-law.  She normally lives in [place] and is in town for Easter holiday.  She is not a smoker.      PHYSICAL EXAMINATION:   VITAL SIGNS:  The patient's temperature is 98.9, pulse 67 with a blood pressure 115/54, respiratory rate is 16 with 97% saturations on room air.   GENERAL:  She is uncomfortable appearing but nontoxic.   HEART:  Sounds are regular without murmurs.   LUNGS:  Breath sounds are without wheezes or crackles.   ABDOMEN:  Soft and obese without overt distention.  She has some mild diffuse tenderness in the upper abdomen with focal peritonitis in the  left lower quadrant.      LABORATORY EXAMS:  Notable for white blood cell count today of 20.8 thousand, at the time of evaluation yesterday was 24.7 thousand.  Hemoglobin is 13.0.  Electrolyte profile is normal.      IMAGING:  I reviewed the patient's CT from today, this shows inflammation around the sigmoid colon with an adjacent developing abscess, there are multiple diverticula throughout this region and descending colon as well.  There is extraluminal gas around the sigmoid colon as well as up under the diaphragm consistent with a free perforation.      ASSESSMENT AND PLAN:  This is a 68-year-old female with known diverticulitis which has been long-standing recurrent, now presents with perforation which seems to be free, she has significant leukocytosis and persistent pain and because of all of this, I offered and recommended exploratory laparotomy with colectomy and colostomy.  Risks of the operation, otherwise discussed in detail including infection, bleeding, harm to structures, a stump blow out need for staged surgery for reanastomosis as well as her anticipated convalescence.  The patient verbalized understanding of the above understands that this is her best option in terms of longitudinal recovery.  All questions otherwise were answered to the best of my ability.         ELIE OMER MD             D: 2018   T: 2018   MT: DIXON      Name:     GONZALO MA   MRN:      [id number]        Account:       [id number]   :      1950           Consult Date:  2018      Document: [id number]       cc: Ike Caldwell MD

## 2018-03-31 NOTE — BRIEF OP NOTE
[facility] Brief Operative Note    Pre-operative diagnosis: Diverticulitis with pneumoperitoneum   Post-operative diagnosis Complicated sigmoid diverticulitis with perforation and purulent pertonitis   Procedure: Procedure(s):  LAPAROTOMY EXPLORATORY, SIGMOID COLECTOMY, COLOSTOMY , Peritoneal Washings - Wound Class: III-Contaminated   - Wound Class: III-Contaminated   Surgeon(s): Surgeon(s) and Role:     * Rodrigo Claire MD - Primary     * Katerin Bedoya PA-C   Estimated blood loss: 50 mL    Specimens:   ID Type Source Tests Collected by Time Destination   1 : peritoneal fluid Fluid Peritoneum ANAEROBIC BACTERIAL CULTURE, FLUID CULTURE AEROBIC BACTERIAL, GRAM STAIN Rodrigo Claire MD 3/31/2018  2:01 PM    A : sigmoid colon- suture proximal Tissue Colon SURGICAL PATHOLOGY EXAM Rodrigo Claire MD 3/31/2018  3:10 PM       Findings: Free pus upon entering the abdomen, significant inflammation involving the mid sigmoid colon without gross site of perforation. Segmental resection with end colostomy creation performed.

## 2018-03-31 NOTE — ANESTHESIA POSTPROCEDURE EVALUATION
Patient: Christina Cotto    Procedure(s):  LAPAROTOMY EXPLORATORY, SIGMOID COLECTOMY, COLOSTOMY , Peritoneal Washings - Wound Class: IV-Dirty or Infected   - Wound Class: IV-Dirty or Infected    Diagnosis:free air in abdominal area  Diagnosis Additional Information: Complicated sigmoid diverticulitis with perforation and purulent pertonitis    Anesthesia Type:  General, ETT    Note:  Anesthesia Post Evaluation    Patient location during evaluation: PACU  Patient participation: Able to fully participate in evaluation  Level of consciousness: awake and alert  Pain management: adequate  Airway patency: patent  Cardiovascular status: acceptable  Respiratory status: acceptable  Hydration status: acceptable  PONV: none     Anesthetic complications: None          Last vitals:  Vitals:    03/31/18 1548 03/31/18 1550 03/31/18 1555   BP: 112/49 105/55    Pulse:      Resp: 16 20 15   Temp: 96.2  F (35.7  C)     SpO2: 96% 96% 96%         Electronically Signed By: Jose Adam MD  March 31, 2018  4:16 PM

## 2018-04-01 LAB
ANION GAP SERPL CALCULATED.3IONS-SCNC: 4 MMOL/L (ref 3–14)
BASOPHILS # BLD AUTO: 0 10E9/L (ref 0–0.2)
BASOPHILS NFR BLD AUTO: 0.1 %
BUN SERPL-MCNC: 10 MG/DL (ref 7–30)
CALCIUM SERPL-MCNC: 8.6 MG/DL (ref 8.5–10.1)
CHLORIDE SERPL-SCNC: 106 MMOL/L (ref 94–109)
CO2 SERPL-SCNC: 26 MMOL/L (ref 20–32)
CREAT SERPL-MCNC: 0.79 MG/DL (ref 0.52–1.04)
DIFFERENTIAL METHOD BLD: ABNORMAL
EOSINOPHIL # BLD AUTO: 0 10E9/L (ref 0–0.7)
EOSINOPHIL NFR BLD AUTO: 0.1 %
ERYTHROCYTE [DISTWIDTH] IN BLOOD BY AUTOMATED COUNT: 13.2 % (ref 10–15)
GFR SERPL CREATININE-BSD FRML MDRD: 72 ML/MIN/1.7M2
GLUCOSE SERPL-MCNC: 129 MG/DL (ref 70–99)
HBA1C MFR BLD: 6.4 % (ref 0–6.4)
HCT VFR BLD AUTO: 35.9 % (ref 35–47)
HGB BLD-MCNC: 11.5 G/DL (ref 11.7–15.7)
IMM GRANULOCYTES # BLD: 0.2 10E9/L (ref 0–0.4)
IMM GRANULOCYTES NFR BLD: 1.1 %
LYMPHOCYTES # BLD AUTO: 1 10E9/L (ref 0.8–5.3)
LYMPHOCYTES NFR BLD AUTO: 4.8 %
MCH RBC QN AUTO: 32.3 PG (ref 26.5–33)
MCHC RBC AUTO-ENTMCNC: 32 G/DL (ref 31.5–36.5)
MCV RBC AUTO: 101 FL (ref 78–100)
MONOCYTES # BLD AUTO: 1.2 10E9/L (ref 0–1.3)
MONOCYTES NFR BLD AUTO: 6.1 %
NEUTROPHILS # BLD AUTO: 17.2 10E9/L (ref 1.6–8.3)
NEUTROPHILS NFR BLD AUTO: 87.8 %
NRBC # BLD AUTO: 0 10*3/UL
NRBC BLD AUTO-RTO: 0 /100
PLATELET # BLD AUTO: 260 10E9/L (ref 150–450)
POTASSIUM SERPL-SCNC: 4.5 MMOL/L (ref 3.4–5.3)
RBC # BLD AUTO: 3.56 10E12/L (ref 3.8–5.2)
SODIUM SERPL-SCNC: 136 MMOL/L (ref 133–144)
WBC # BLD AUTO: 19.6 10E9/L (ref 4–11)

## 2018-04-01 PROCEDURE — A9270 NON-COVERED ITEM OR SERVICE: HCPCS | Mod: GY | Performed by: SURGERY

## 2018-04-01 PROCEDURE — 99222 1ST HOSP IP/OBS MODERATE 55: CPT | Performed by: INTERNAL MEDICINE

## 2018-04-01 PROCEDURE — 25000132 ZZH RX MED GY IP 250 OP 250 PS 637: Mod: GY | Performed by: SURGERY

## 2018-04-01 PROCEDURE — 83036 HEMOGLOBIN GLYCOSYLATED A1C: CPT | Performed by: SURGERY

## 2018-04-01 PROCEDURE — 36415 COLL VENOUS BLD VENIPUNCTURE: CPT | Performed by: SURGERY

## 2018-04-01 PROCEDURE — 85025 COMPLETE CBC W/AUTO DIFF WBC: CPT | Performed by: SURGERY

## 2018-04-01 PROCEDURE — 25000128 H RX IP 250 OP 636: Performed by: SURGERY

## 2018-04-01 PROCEDURE — 25000125 ZZHC RX 250: Performed by: INTERNAL MEDICINE

## 2018-04-01 PROCEDURE — 99207 ZZC CONSULT E&M CHANGED TO INITIAL LEVEL: CPT | Performed by: INTERNAL MEDICINE

## 2018-04-01 PROCEDURE — 80048 BASIC METABOLIC PNL TOTAL CA: CPT | Performed by: SURGERY

## 2018-04-01 PROCEDURE — 12000000 ZZH R&B MED SURG/OB

## 2018-04-01 RX ADMIN — ACETAMINOPHEN 975 MG: 325 TABLET, FILM COATED ORAL at 02:58

## 2018-04-01 RX ADMIN — PIPERACILLIN SODIUM AND TAZOBACTAM SODIUM 3.38 G: 3; .375 INJECTION, POWDER, LYOPHILIZED, FOR SOLUTION INTRAVENOUS at 15:07

## 2018-04-01 RX ADMIN — SODIUM CHLORIDE, POTASSIUM CHLORIDE, SODIUM LACTATE AND CALCIUM CHLORIDE: 600; 310; 30; 20 INJECTION, SOLUTION INTRAVENOUS at 23:21

## 2018-04-01 RX ADMIN — ACETAMINOPHEN 975 MG: 325 TABLET, FILM COATED ORAL at 11:03

## 2018-04-01 RX ADMIN — SODIUM CHLORIDE, POTASSIUM CHLORIDE, SODIUM LACTATE AND CALCIUM CHLORIDE: 600; 310; 30; 20 INJECTION, SOLUTION INTRAVENOUS at 08:06

## 2018-04-01 RX ADMIN — PIPERACILLIN SODIUM AND TAZOBACTAM SODIUM 3.38 G: 3; .375 INJECTION, POWDER, LYOPHILIZED, FOR SOLUTION INTRAVENOUS at 02:59

## 2018-04-01 RX ADMIN — MINERAL OIL AND WHITE PETROLATUM: 150; 830 OINTMENT OPHTHALMIC at 20:34

## 2018-04-01 RX ADMIN — PIPERACILLIN SODIUM AND TAZOBACTAM SODIUM 3.38 G: 3; .375 INJECTION, POWDER, LYOPHILIZED, FOR SOLUTION INTRAVENOUS at 20:16

## 2018-04-01 RX ADMIN — ACETAMINOPHEN 975 MG: 325 TABLET, FILM COATED ORAL at 18:39

## 2018-04-01 RX ADMIN — PIPERACILLIN SODIUM AND TAZOBACTAM SODIUM 3.38 G: 3; .375 INJECTION, POWDER, LYOPHILIZED, FOR SOLUTION INTRAVENOUS at 09:05

## 2018-04-01 RX ADMIN — ENOXAPARIN SODIUM 40 MG: 40 INJECTION SUBCUTANEOUS at 12:20

## 2018-04-01 ASSESSMENT — ACTIVITIES OF DAILY LIVING (ADL)
ADLS_ACUITY_SCORE: 11
ADLS_ACUITY_SCORE: 17

## 2018-04-01 ASSESSMENT — PAIN DESCRIPTION - DESCRIPTORS: DESCRIPTORS: SHARP;ACHING

## 2018-04-01 NOTE — PROVIDER NOTIFICATION
"Patient would like to know when vybrid PO will be continued. She states staff will have to \"get the duct tape and chains out\" if she is not on her antidepressant and states she is not nice. Self image drops and patient takes out on every one else. Web based paged Maida VILLATORO: When will vybrid be continued? Pt isn't nice when off. Call with questions.  "

## 2018-04-01 NOTE — PLAN OF CARE
Problem: Patient Care Overview  Goal: Plan of Care/Patient Progress Review  Outcome: No Change  Alert and oriented. Pain controlled with minimal use of PCA. Bowel sounds hypoactive. Small amount of stool in colostomy bag. Stoma red and protruding. Dressing to incision with small amount of shadowing. PCD's on.

## 2018-04-01 NOTE — PROGRESS NOTES
"[facility]   General Surgery Progress Note           Assessment and Plan:   Assessment:   POD#1 s/p Procedure(s):  LAPAROTOMY EXPLORATORY, SIGMOID COLECTOMY, COLOSTOMY , Peritoneal Washings - Wound Class: IV-Dirty or Infected   - Wound Class: IV-Dirty or Infected        Plan:   -prophylaxis against venous thromboembolism  -Keep NPO for now         Interval History:   Feels better than pre-op         Physical Exam:   Blood pressure 111/59, pulse 60, temperature 97.6  F (36.4  C), temperature source Oral, resp. rate 13, height 1.626 m (5' 4\"), weight 108.9 kg (240 lb 1.6 oz), SpO2 93 %.    I/O last 3 completed shifts:  In: 3496 [P.O.:180; I.V.:3316]  Out: 1150 [Urine:1000; Drains:100; Blood:50]    Abdomen:   soft, rounded and obese but non-distended, tenderness noted at incision    Inc(s) - , intact, phil in place      Stoma - viable, small amount of stool output  Ok - sanguinous          Data:       Recent Labs  Lab 04/01/18  0626 03/31/18  1817 03/31/18  1033 03/30/18  1158   WBC 19.6*  --  20.8* 24.7*   HGB 11.5*  --  13.0 13.5   HCT 35.9  --  40.1 40.5   *  --  100 98    256 285 307       Jn Ozuna MD     "

## 2018-04-01 NOTE — PLAN OF CARE
Problem: Patient Care Overview  Goal: Plan of Care/Patient Progress Review  Outcome: Therapy, progress towards functional goals is fair  Patient received from PACU with x1 transporter via stretcher around 1740. Complicated sigmoid diverticulitis with perforation and purulent peritonitis. POD #0: s/p Exploratory Laparotomy,Sigmoid colectomy, colostomy & peritoneal washings. Capnography x24 hrs & continuous Pulse Oxy. Dilaudid PCA (0.1 Mg dose/lock-out:10 mins/1hr limit: 1.2 Mg), No continuous dose. Abdominal Left lower quadrant colostomy intact with brown stool. CARMENCITA drain to bulb suction with serous/sero-sang drainage. Mid-abdominal incision has intact staples. Hartman cath draining via gravity into drainage bag without difficulty. (+) CSM BUE/BLE. Lung sounds clear, O2 sats > 95% on 2 Liters NC. No respiratory or cardiac distress noted.

## 2018-04-01 NOTE — PHARMACY-ADMISSION MEDICATION HISTORY
Admission medication history interview status for this patient is complete. See [facility] admission navigator for allergy information, prior to admission medications and immunization status.     Medication history interview source(s):Patient and Family  Medication history resources (including written lists, pill bottles, clinic record):List from home  Primary pharmacy:    Changes made to PTA medication list:  Added: Vitamin C, MVI, Calc + Vit D, Eye ointment and drops; also dosage to other medications per list and patient.  Deleted: none  Changed: As Above    Actions taken by pharmacist (provider contacted, etc):None     Additional medication history information:Review list from home with patient; patient is from [facility]    Medication reconciliation/reorder completed by provider prior to medication history? No    For patients on insulin therapy: NO   Lantus/levemir/NPH/Mix 70/30 dose: _____ in AM/PM or twice daily   Sliding scale Novolog Y/N   If Yes, do you have a baseline novolog pre-meal dose: ______units with meals   Patients eat three meals a day: Y/N   Any Barriers to therapy: cost of medications/comfortable with giving injections (if applicable)/ comfortable and confident with current diabetes regimen     Prior to Admission medications    Medication Sig Last Dose Taking? Auth Provider   Vilazodone HCl (VIIBRYD PO) Take 40 mg by mouth daily  3/30/2018 at Unknown time Yes Reported, Patient   LEVOTHYROXINE SODIUM PO Take 100 mcg by mouth daily  3/30/2018 at a.m Yes Reported, Patient   CYCLOBENZAPRINE HCL PO Take 5 mg by mouth daily as needed  Past Week at Unknown time Yes Reported, Patient   buPROPion (WELLBUTRIN XL) 300 MG 24 hr tablet Take by mouth every morning 3/30/2018 at a.m Yes Unknown, Entered By History   propranolol (INDERAL LA) 80 MG 24 hr capsule Take 80 mg by mouth daily For tremor 3/30/2018 at a.m Yes Unknown, Entered By History   ascorbic acid 500 MG TABS Take 1,000 mg by mouth daily 3/30/2018 at a.m  Yes Unknown, Entered By History   multivitamin, therapeutic (THERA-VIT) TABS tablet Take 1 tablet by mouth daily 3/30/2018 at a.m Yes Unknown, Entered By History   Calcium carb-Vitamin D 500 mg [place]-200 units (OSCAL WITH D;OYSTER SHELL CALCIUM) 500-200 MG-UNIT per tablet Take 1 tablet by mouth daily 3/30/2018 at Unknown time Yes Unknown, Entered By History   polyethylene glycol 0.4%- propylene glycol 0.3% (SYSTANE ULTRA) 0.4-0.3 % SOLN ophthalmic solution Place 1 drop into both eyes 4 times daily as needed for dry eyes Past days Yes Unknown, Entered By History   propylene glycol (SYSTANE BALANCE) 0.6 % SOLN ophthalmic solution Place 1 drop into both eyes 4 times daily as needed for dry eyes 3/30/2018 at Unknown time Yes Unknown, Entered By History   Artificial Tear Ointment (REFRESH P.M.) OINT Apply to eye At Bedtime Past days Yes Unknown, Entered By History   ALPRAZolam (XANAX PO) Take 0.25 mg by mouth 3 times daily as needed  3/30/2018 at Unknown time Yes Reported, Patient   aspirin 81 MG chewable tablet Take 81 mg by mouth daily 3/30/2018 at Unknown time Yes Reported, Patient   Omeprazole (PRILOSEC PO) Take 20 mg by mouth every morning  3/30/2018 at Unknown time Yes Reported, Patient   Spironolactone (ALDACTONE PO) Take 50 mg by mouth daily  3/30/2018 at Unknown time Yes Reported, Patient

## 2018-04-01 NOTE — CONSULTS
[facility]  Hospitalist Consult Note  Name: Christina Cotto    MRN: [id number]  YOB: 1950    Age: 68 year old  Date of admission: 3/31/2018  Primary care provider: Ike Caldwell     Requesting Physician:  Dr. Claire  Reason for consult:  Post-operative medical management         Assessment and Plan:   Christina Cotto is a 68 year old female with a history of hypertension, anxiety, depression and recurrent diverticulitis who was admitted 3/31/18 in the setting of recent treatment of diverticulitis with worsening abdominal pain and was found to have perforated colon.  She is status post sigmoid colectomy with colostomy formation.    1.  Recurrent diverticulitis with perforation status post sigmoid colectomy and colostomy: Patient has had recurrent episodes of diverticulitis.  She was recently treated with antibiotics.  He had worsening abdominal pain and was ultimately found to have perforation.  She is status post sigmoid colectomy and colostomy on 3/31.  She remains n.p.o. at this time.  -Defer diet, activity and prophylaxis to surgical team    2.  Hypertension: Prior to admission the patient was on Spironolactone.  She has IV metoprolol ordered with hold parameters.  Once she is able to take p.o. well transition back to Spironolactone.    3.  Anxiety and depression: Prior to admission patient was on Vilazodone, Wellbutrin and as needed Xanax.  She is still n.p.o. given her bowel surgery.  As soon as she is able to take p.o. we will resume these medications.  I did offer as needed IV Ativan but she declined this.    4.  GERD: We will use IV Protonix until she is able to take p.o.    5.  Hypothyroidism: Synthroid will be resumed when she is able to take p.o.    Code status: Full code  Prophylaxis: Deferred to primary team  Disposition: Deferred to primary team    Thank you for the consultation, we will continue to follow along during the hospitalization. Please page with any questions  or concerns.         History of Present Illness:   Christina Cotto is a 68 year old female with a history of hypertension, anxiety, depression and recurrent diverticulitis who was admitted 3/31/18 in the setting of recent treatment of diverticulitis with worsening abdominal pain and was found to have perforated colon.  She is status post sigmoid colectomy with colostomy formation. Pre-operative note was fully reviewed and recommendations acknowledged. Op note and anesthesia notes and flow sheets reviewed.     The patient is status post sigmoid colectomy and colostomy formation.  Her pain seems to be fairly well-controlled with the current regimen.  She denies nausea or vomiting.  No chest pain, shortness of breath, cough, fevers or chills.  Her biggest concern is when she will be able to take oral medications.  She notes that she gets extremely rude when she is not able to take her psychiatric medications.  I did review that as soon as she is able to take oral medications I will restart these.              Past Medical History:     Past Medical History:   Diagnosis Date     PONV (postoperative nausea and vomiting)     slow to wake up             Past Surgical History:   No past surgical history on file.            Social History:     Social History   Substance Use Topics     Smoking status: Former Smoker     Smokeless tobacco: Never Used     Alcohol use Not on file      Comment: 1-2 glasses of wine per month             Family History:   Family history was fully reviewed and non-contributory in this case.          Allergies:   No Known Allergies          Medications:     Prior to Admission medications    Medication Sig Last Dose Taking? Auth Provider   Vilazodone HCl (VIIBRYD PO) Take 40 mg by mouth daily  3/30/2018 at Unknown time Yes Reported, Patient   LEVOTHYROXINE SODIUM PO Take 100 mcg by mouth daily  3/30/2018 at a.m Yes Reported, Patient   CYCLOBENZAPRINE HCL PO Take 5 mg by mouth daily as needed  Past Week  at Unknown time Yes Reported, Patient   buPROPion (WELLBUTRIN XL) 300 MG 24 hr tablet Take by mouth every morning 3/30/2018 at a.m Yes Unknown, Entered By History   propranolol (INDERAL LA) 80 MG 24 hr capsule Take 80 mg by mouth daily For tremor 3/30/2018 at a.m Yes Unknown, Entered By History   ascorbic acid 500 MG TABS Take 1,000 mg by mouth daily 3/30/2018 at a.m Yes Unknown, Entered By History   multivitamin, therapeutic (THERA-VIT) TABS tablet Take 1 tablet by mouth daily 3/30/2018 at a.m Yes Unknown, Entered By History   Calcium carb-Vitamin D 500 mg [place]-200 units (OSCAL WITH D;OYSTER SHELL CALCIUM) 500-200 MG-UNIT per tablet Take 1 tablet by mouth daily 3/30/2018 at Unknown time Yes Unknown, Entered By History   polyethylene glycol 0.4%- propylene glycol 0.3% (SYSTANE ULTRA) 0.4-0.3 % SOLN ophthalmic solution Place 1 drop into both eyes 4 times daily as needed for dry eyes Past days Yes Unknown, Entered By History   propylene glycol (SYSTANE BALANCE) 0.6 % SOLN ophthalmic solution Place 1 drop into both eyes 4 times daily as needed for dry eyes 3/30/2018 at Unknown time Yes Unknown, Entered By History   Artificial Tear Ointment (REFRESH P.M.) OINT Apply to eye At Bedtime Past days Yes Unknown, Entered By History   ALPRAZolam (XANAX PO) Take 0.25 mg by mouth 3 times daily as needed  3/30/2018 at Unknown time Yes Reported, Patient   aspirin 81 MG chewable tablet Take 81 mg by mouth daily 3/30/2018 at Unknown time Yes Reported, Patient   Omeprazole (PRILOSEC PO) Take 20 mg by mouth every morning  3/30/2018 at Unknown time Yes Reported, Patient   Spironolactone (ALDACTONE PO) Take 50 mg by mouth daily  3/30/2018 at Unknown time Yes Reported, Patient       Current hospital administered medication list (MAR) also reviewed.          Review of Systems:   A comprehensive greater than 10 system review of systems was carried out.  Pertinent positives and negatives are noted above.  Otherwise negative for contributory  "info.            Physical Exam:   Blood pressure 111/59, pulse 60, temperature 97.6  F (36.4  C), temperature source Oral, resp. rate 13, height 1.626 m (5' 4\"), weight 108.9 kg (240 lb 1.6 oz), SpO2 93 %.  Exam:  General: Alert, awake, no acute distress.  HEENT: Normocephalic and atraumatic, eyes anicteric and without scleral injection, EOMI, face symmetric, MMM.  Cardiac: RRR, normal S1, S2. No m/g/r, no LE edema.  Pulmonary: Normal chest rise, normal work of breathing.  Lungs CTAB without crackles or wheezing.  Abdomen: soft, non-distended.  Hypoactive bowel sounds, no guarding or rebound tenderness. Ostomy and drain in place.  Extremities: no deformities.  Warm, well perfused.  Skin: no rashes or lesions.  Warm and Dry.  Neuro: No focal deficits.  Speech clear.  Spontaneously moving all extremities in bed.  Psych: Alert and oriented x3. Appropriate affect.          Data:   Imaging:  Reviewed.    Labs: Reviewed.     Recent Labs  Lab 04/01/18  0626 03/31/18  1817 03/31/18  1033 03/30/18  1158   WBC 19.6*  --  20.8* 24.7*   HGB 11.5*  --  13.0 13.5   HCT 35.9  --  40.1 40.5   *  --  100 98    256 285 307       Recent Labs  Lab 04/01/18  0626 03/31/18  1817 03/31/18  1033 03/30/18  1158     --  133 135   POTASSIUM 4.5  --  3.9 3.9   CHLORIDE 106  --  102 102   CO2 26  --  26 25   ANIONGAP 4  --  5 8   *  --  134* 155*   BUN 10  --  12 11   CR 0.79 0.85 0.82 0.80   GFRESTIMATED 72 66 68 71   GFRESTBLACK 87 80 83 86   JESSICA 8.6  --  9.2 9.3       Cass Bey MD  [facility] Hospitalist  April 1, 2018     "

## 2018-04-01 NOTE — PLAN OF CARE
Problem: Patient Care Overview  Goal: Plan of Care/Patient Progress Review  Outcome: No Change  Patient is POD 1 from sigmoid cholectomy and colostomy.Alert and oriented x4.  Pain is being managed with PCA dilaudid. Patient is A1 for mobility. Patient is NPO. Patient has not had flatus this shift. Hypoactive bowel sounds. VSS. Patient on capnography. Colostomy device changed x1 and dressing from floor nurse. Patient had visitors throughout shift.

## 2018-04-01 NOTE — OP NOTE
Procedure Date: 03/31/2018      DATE OF SERVICE: 03/31/2018      PREOPERATIVE DIAGNOSIS:  Complicated diverticulitis with pneumoperitoneum.      POSTOPERATIVE DIAGNOSIS:  Complicated diverticulitis with pneumoperitoneum.      PROCEDURES:  Exploratory laparotomy with sigmoid colectomy, end colostomy creation and peritoneal washout.      ANESTHESIA:  General.      SURGEON:  Rodrigo Newman MD      ASSISTANT:  Katerin Bedoya PA-C        FUNCTION OF ASSISTANT: Physician assistant was medically necessary for her skills in suturing, cutting of suture, retraction, suctioning and exposure.      SPECIMENS:   1.  Peritoneal fluid for culturing and Gram stain.   2.  Sigmoid colon for routine pathologic handling, suture marks the new margin.  Included in the specimen is re-stapling of the rectal stump.      COMPLICATIONS:  None.      INDICATIONS:  Ms. Cotto is a 68-year-old female who is in town from [place], visiting relatives for the Easter holiday, who presented to our ER yesterday with what she thought was constipation.  The patient has had more than a dozen or so episodes of uncomplicated diverticulitis over the years and interestingly, just completed a course of oral antibiotics for the same.  She had worsening of her pain after being seen yesterday and returned to the ER today where a repeat CT scan showed diverticulitis with pneumoperitoneum, including gas up under the diaphragm.  Because of these new findings, I offered and strongly recommended exploratory laparotomy today.  Risks of the procedure including infection, bleeding, harm to adjacent structures, need for temporary colostomy, postoperative complications such as MI and pneumonia were all reviewed.  The patient verbalized understanding of the above and consented to proceed.      FINDINGS:  There was free purulent material within the abdominal cavity upon entering the peritoneum.  There was severe induration of the mid portion of the sigmoid colon without  gross perforation.  There were numerous diverticula throughout this segment and the descending colon as well.  We created a segmental resection of the indurated colon and brought up an end colostomy.      DESCRIPTION OF PROCEDURE:  With the patient under excellent general anesthesia in a supine position, a Hartman catheter was placed and the abdomen was subsequently prepped and draped out in the usual sterile surgical fashion.  Timeout was performed confirming the patient, procedure to be done, as well as drug allergies.  She did receive a dose of Zosyn while in the ER for treatment of her diverticulitis and required no prophylaxis for this surgery.        We began by making a periumbilical incision a total of approximately 15 cm towards the pubis.  Electrocautery dissection was carried out down to the level of the midline fascia, which was then incised with electrocautery as well.  This exposed the preperitoneal fat which was divided layer by layer until we encountered the peritoneum.  This was entered bluntly and a moderate amount of purulent material emanated through this defect.  We aspirated several mL of this and submitted as peritoneal washings for culture.  The fascia was then opened the entire length of our skin incision, with my finger as a backstop below the fascia, using electrocautery.  We suctioned as much purulent material as we could visualize initially.  We placed a medium-sized Chris wound protector into the wound for better exposure.  We packed the small bowel up using multiple dampened laparotomy sponges.  This allowed us to expose the sigmoid colon without difficulty.  We bluntly freed it up from the pelvis and elevated it into view.  There was a segment of about 15 cm of severely indurated sigmoid colon going down to softer rectum.  We fenestrated the mesentery around the superior aspect of the rectum using electrocautery and blunt dissection with a large [place].  A thick tissue Contour stapler  was brought into the field and the rectosigmoid junction was stapled and divided after holding the stapler closed for pressure for about 20 seconds.  We inspected our staple line and noted that there was a diverticulum in the rectal stump aspect, which was open to the lumen of the rectum.  We elected to attend to this in due time after we had the specimen out of our view.        We picked a point proximally where the colon was soft and this was approximately 15 cm proximal to the staple line.  We also fenestrated the mesentery here and fired a LORETA 100 stapler across this to divide the bowel.  We then used a LigaSure Impact device to divide the mesentery between the 2 staple lines.  Once the specimen was entirely freed, it was marked with a stitch at the proximal aspect, filleted open on the antimesenteric side for better fixation, and submitted in formalin for routine pathologic handling.  We then removed all of our packs and copiously irrigated the peritoneal cavity with 5 liters of warmed sterile saline.  We then repacked the wound and grasped and elevated the rectal stump into view.  We used a purple load from a LORETA 45 stapler to trim approximately 2 cm off the top of the rectum to completely close it.  This resultant tissue was submitted with the sigmoid colon as well.  We placed 2-0 nylon pigmented stitches on the corners of the rectal stump and placed a piece of Seprafilm over this as well.  We then mobilized the descending colon along the white line of [place] for about half the distance up the left paracolic gutter.  We divided the mesentery towards the base to allow us to elevate the colon further.  We then picked a point for our colostomy maturation.        I had previously marked a crease across the patient's mid abdomen as a result of her habitus.  We elected to use a point several centimeters below this.  An approximately 3 cm skin ellipse was made sharply, the fat deep to this was cored out partially  down to the level of the anterior rectus sheath.  The sheath was incised in a cruciate fashion, the rectus muscle was splayed and the posterior sheath was incised as well.  This was then dilated with 2 fingers and the descending colon was brought through it without difficulty.  We then brought in a 19 round Ok drain through a right lower quadrant stab site and placed the drainage portion of the drain into the dependent portion of the pelvis.  This was anchored to the skin with a 2-0 nylon.  We then removed the packs and closed the midline fascia with #1 PDS in a running fashion x 2.  The subcutaneous space was then copiously irrigated with warm saline again.  Skin was closed loosely with interrupted staples, leaving a gap every several centimeters for Telfa wicking, which was placed as well.  We covered the midline wound and attended to the colostomy site for maturation.        The staple line was cut free for about 3/4 of its length.  We Edna'd the superior, lateral and medial aspects of the colon with 3-0 Vicryls, turning it outwards.  Interrupted 3-0 Vicryls were used in each of the gaps between these points.  A stoma appliance was then applied.  We also placed a midline dry sterile dressing.  The patient tolerated the procedure well. She was brought to recovery with the ET tube in place, but otherwise stable.  All sharps, sponge and instrument counts were correct at the conclusion of the case.         ELIE OMER MD             D: 2018   T: 2018   MT:       Name:     GONZALO MA   MRN:      [id number]        Account:        [id number]   :      1950           Procedure Date: 2018      Document: [id number]       cc: Ike Caldwell MD

## 2018-04-02 LAB
GLUCOSE BLDC GLUCOMTR-MCNC: 77 MG/DL (ref 70–99)
INTERPRETATION ECG - MUSE: NORMAL

## 2018-04-02 PROCEDURE — 25000128 H RX IP 250 OP 636: Performed by: SURGERY

## 2018-04-02 PROCEDURE — 40000903 ZZH STATISTIC WOC PT EDUCATION, 31-45 MIN

## 2018-04-02 PROCEDURE — 25000132 ZZH RX MED GY IP 250 OP 250 PS 637: Mod: GY | Performed by: SURGERY

## 2018-04-02 PROCEDURE — 12000000 ZZH R&B MED SURG/OB

## 2018-04-02 PROCEDURE — A9270 NON-COVERED ITEM OR SERVICE: HCPCS | Mod: GY | Performed by: INTERNAL MEDICINE

## 2018-04-02 PROCEDURE — A9270 NON-COVERED ITEM OR SERVICE: HCPCS | Mod: GY | Performed by: SURGERY

## 2018-04-02 PROCEDURE — 25000132 ZZH RX MED GY IP 250 OP 250 PS 637: Mod: GY | Performed by: INTERNAL MEDICINE

## 2018-04-02 PROCEDURE — 99232 SBSQ HOSP IP/OBS MODERATE 35: CPT | Performed by: INTERNAL MEDICINE

## 2018-04-02 PROCEDURE — 25000125 ZZHC RX 250: Performed by: INTERNAL MEDICINE

## 2018-04-02 PROCEDURE — 00000146 ZZHCL STATISTIC GLUCOSE BY METER IP

## 2018-04-02 RX ORDER — SPIRONOLACTONE 25 MG/1
50 TABLET ORAL DAILY
Status: DISCONTINUED | OUTPATIENT
Start: 2018-04-03 | End: 2018-04-02

## 2018-04-02 RX ORDER — VILAZODONE HYDROCHLORIDE 20 MG/1
40 TABLET ORAL DAILY
Status: DISCONTINUED | OUTPATIENT
Start: 2018-04-02 | End: 2018-04-06 | Stop reason: HOSPADM

## 2018-04-02 RX ORDER — TRAMADOL HYDROCHLORIDE 50 MG/1
50 TABLET ORAL EVERY 6 HOURS PRN
Status: DISCONTINUED | OUTPATIENT
Start: 2018-04-02 | End: 2018-04-04

## 2018-04-02 RX ORDER — SPIRONOLACTONE 25 MG/1
50 TABLET ORAL DAILY
Status: DISCONTINUED | OUTPATIENT
Start: 2018-04-02 | End: 2018-04-06 | Stop reason: HOSPADM

## 2018-04-02 RX ORDER — ALPRAZOLAM 0.25 MG
0.25 TABLET ORAL 3 TIMES DAILY PRN
Status: DISCONTINUED | OUTPATIENT
Start: 2018-04-02 | End: 2018-04-06 | Stop reason: HOSPADM

## 2018-04-02 RX ORDER — HYDROMORPHONE HYDROCHLORIDE 1 MG/ML
.3-.5 INJECTION, SOLUTION INTRAMUSCULAR; INTRAVENOUS; SUBCUTANEOUS
Status: DISCONTINUED | OUTPATIENT
Start: 2018-04-02 | End: 2018-04-06 | Stop reason: HOSPADM

## 2018-04-02 RX ORDER — LEVOTHYROXINE SODIUM 100 UG/1
100 TABLET ORAL DAILY
Status: DISCONTINUED | OUTPATIENT
Start: 2018-04-02 | End: 2018-04-06 | Stop reason: HOSPADM

## 2018-04-02 RX ORDER — BUPROPION HYDROCHLORIDE 150 MG/1
300 TABLET ORAL DAILY
Status: DISCONTINUED | OUTPATIENT
Start: 2018-04-02 | End: 2018-04-06 | Stop reason: HOSPADM

## 2018-04-02 RX ORDER — PROPRANOLOL HYDROCHLORIDE 80 MG/1
80 CAPSULE, EXTENDED RELEASE ORAL DAILY
Status: DISCONTINUED | OUTPATIENT
Start: 2018-04-02 | End: 2018-04-06 | Stop reason: HOSPADM

## 2018-04-02 RX ORDER — LANOLIN ALCOHOL/MO/W.PET/CERES
3 CREAM (GRAM) TOPICAL
Status: DISCONTINUED | OUTPATIENT
Start: 2018-04-02 | End: 2018-04-06 | Stop reason: HOSPADM

## 2018-04-02 RX ADMIN — ACETAMINOPHEN 975 MG: 325 TABLET, FILM COATED ORAL at 09:36

## 2018-04-02 RX ADMIN — PIPERACILLIN SODIUM AND TAZOBACTAM SODIUM 3.38 G: 3; .375 INJECTION, POWDER, LYOPHILIZED, FOR SOLUTION INTRAVENOUS at 15:58

## 2018-04-02 RX ADMIN — ACETAMINOPHEN 975 MG: 325 TABLET, FILM COATED ORAL at 02:46

## 2018-04-02 RX ADMIN — ENOXAPARIN SODIUM 40 MG: 40 INJECTION SUBCUTANEOUS at 12:34

## 2018-04-02 RX ADMIN — PIPERACILLIN SODIUM AND TAZOBACTAM SODIUM 3.38 G: 3; .375 INJECTION, POWDER, LYOPHILIZED, FOR SOLUTION INTRAVENOUS at 09:44

## 2018-04-02 RX ADMIN — BUPROPION HYDROCHLORIDE 300 MG: 150 TABLET, FILM COATED, EXTENDED RELEASE ORAL at 09:37

## 2018-04-02 RX ADMIN — MINERAL OIL AND WHITE PETROLATUM: 150; 830 OINTMENT OPHTHALMIC at 21:43

## 2018-04-02 RX ADMIN — ACETAMINOPHEN 975 MG: 325 TABLET, FILM COATED ORAL at 18:10

## 2018-04-02 RX ADMIN — SODIUM CHLORIDE, POTASSIUM CHLORIDE, SODIUM LACTATE AND CALCIUM CHLORIDE: 600; 310; 30; 20 INJECTION, SOLUTION INTRAVENOUS at 11:09

## 2018-04-02 RX ADMIN — MELATONIN TAB 3 MG 3 MG: 3 TAB at 02:47

## 2018-04-02 RX ADMIN — PIPERACILLIN SODIUM AND TAZOBACTAM SODIUM 3.38 G: 3; .375 INJECTION, POWDER, LYOPHILIZED, FOR SOLUTION INTRAVENOUS at 21:32

## 2018-04-02 RX ADMIN — OMEPRAZOLE 20 MG: 20 CAPSULE, DELAYED RELEASE ORAL at 09:43

## 2018-04-02 RX ADMIN — ALPRAZOLAM 0.25 MG: 0.25 TABLET ORAL at 21:43

## 2018-04-02 RX ADMIN — LEVOTHYROXINE SODIUM 100 MCG: 100 TABLET ORAL at 10:39

## 2018-04-02 RX ADMIN — PROPRANOLOL HYDROCHLORIDE 80 MG: 80 CAPSULE, EXTENDED RELEASE ORAL at 12:32

## 2018-04-02 RX ADMIN — SODIUM CHLORIDE, POTASSIUM CHLORIDE, SODIUM LACTATE AND CALCIUM CHLORIDE: 600; 310; 30; 20 INJECTION, SOLUTION INTRAVENOUS at 21:32

## 2018-04-02 RX ADMIN — SPIRONOLACTONE 50 MG: 25 TABLET ORAL at 10:39

## 2018-04-02 RX ADMIN — VILAZODONE HYDROCHLORIDE 40 MG: 20 TABLET ORAL at 10:40

## 2018-04-02 RX ADMIN — TRAMADOL HYDROCHLORIDE 50 MG: 50 TABLET, COATED ORAL at 19:31

## 2018-04-02 RX ADMIN — PIPERACILLIN SODIUM AND TAZOBACTAM SODIUM 3.38 G: 3; .375 INJECTION, POWDER, LYOPHILIZED, FOR SOLUTION INTRAVENOUS at 02:47

## 2018-04-02 ASSESSMENT — ACTIVITIES OF DAILY LIVING (ADL)
ADLS_ACUITY_SCORE: 11

## 2018-04-02 ASSESSMENT — PAIN DESCRIPTION - DESCRIPTORS: DESCRIPTORS: ACHING

## 2018-04-02 NOTE — PROVIDER NOTIFICATION
Web based paged Maida VILLATORO regarding: Critical lab  From micro lab: peritoneal fluid 3/31 growing moderate of strep intermedius.

## 2018-04-02 NOTE — PLAN OF CARE
Problem: Patient Care Overview  Goal: Plan of Care/Patient Progress Review  Outcome: Improving  Pt up SBA.  VSS.  Afebrile.  Pain well controlled with dilaudid PCA.  LS clear, 1.5L O2.  +BS.  Passing flatus.  Small amount stool in colostomy.  Denies n/v.  Hartman removed at 0600, due to void.  Midline dressing changed.  CARMENCITA dressing changed; 40cc output.  Pt teary overnight and is frustrated and wants her home meds to be started again.

## 2018-04-02 NOTE — PROVIDER NOTIFICATION
Georgina VILLATORO and asked about PO tylenol. Order says NPO except ice chips only. All other meds are IV.

## 2018-04-02 NOTE — PLAN OF CARE
Problem: Patient Care Overview  Goal: Plan of Care/Patient Progress Review  Outcome: No Change  Pt admitted on 03/31 for diverticulitis. IV Zosyn. LR  ml/hr. POD#2. Capno d/c. Pain controlled with PCA dilaudid. 0.5 this shift. Incision clean dry and intact this shift. 1.5 L, 0 at baseline. NPO except for ice chips.. PO Tylenol scheduled, paged admitting MD to see if it needed to be IV. VSS.

## 2018-04-02 NOTE — PROGRESS NOTES
"[facility] Nurse Inpatient Ostomy Assessment      Assessment of ostomy and needs for:   Colostomy      Data:   History:      Per MD note(s):  67 yo female admitted with Complicated diverticulitis with pneumoperitoneum. 3/31/18: s/p: Exploratory laparotomy with sigmoid colectomy, end colostomy creation and peritoneal washout by Dr Newman    Type of ostomy:  Colostomy  Stoma assessment:   ? Size of stoma:  ~ 1 1/2\" seen thru pouch,  pink, moist and edematous  Mucocutaneous Junction (MCJ):  not visualized (barrier in place)  Peristomal skin:  not visualized (barrier in place)  Abdominal assessment: large round  ? N/G still in place?:  no  Output:  small, liquid and brown,      I/O last 3 completed shifts:  In: 2238 [P.O.:60; I.V.:2178]  Out: 2510 [Urine:2375; Drains:105; Stool:30]  Current pouching system:  [place],  one piece, flat and placed in OR 3/31   Pain:  Cramping   ? Is pt still on a PCA? Yes    Diet:             Active Diet Order      Clear Liquid Diet    Education provided  Labs:   Recent Labs   Lab Test  04/01/18   0626   03/23/15   2155   ALBUMIN   --    --   3.9   HGB  11.5*   < >  13.2   WBC  19.6*   < >  12.2*   A1C  6.4   --    --     < > = values in this interval not displayed.           Intervention:     Patient's chart evaluated.      Assessments done today:  Stoma and pouch     Education: begun, intro to [facility] role, stoma care, pouch change, discharge follow up    Prepared for discharge by: Supplies ordered, Prescriptions or note left on chart for MD to sign/complete, Discussed making a [facility] Nurse outpatient appointment upon discharge, Discussed when to follow up with a [facility] Nurse in the future, Discussed how/ where to order supplies and followup in [place].    Pt registered for ostomy supply samples? 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 Has supportive family around her and will plan to return to [place] when discharged.     Effectiveness of current pouching/ supply plan: intact 2 days, will need to change tomorrow         Plan:     Plan:   ? Current pouching system: intact, provided a Coloplast pouch to evaluate     Education:  ? Education provided during hospital stay:  Pouch Emptying and Pouch Replacement, How to cuff and clean pouch, How to empty pouch, Removal of pouch, Preparation of new pouch, Cutting out or evaluating a pattern, Applying paste or rings, Applying appliance to abdomen, Peristomal skin care, Diet and hydration / fluid balance, Odor / flatus management and Lifestyle Adjustments.   o Supply company: TBD- instructed to notify insurance company  o Do WOC Nurse recommend home care ? TBD; pt lives in [place]

## 2018-04-02 NOTE — PROGRESS NOTES
[facility]  Hospitalist Progress Note  Cass Bey MD 04/02/18  Text Page  Pager: [phone] (7am-6pm)    Reason for Stay (Diagnosis): Perforated Colon         Assessment and Plan:      Summary of Stay: Christina Cotto is a 68 year old female with a history of hypertension, anxiety, depression and recurrent diverticulitis who was admitted 3/31/18 in the setting of recent treatment of diverticulitis with worsening abdominal pain and was found to have perforated colon.  She is status post sigmoid colectomy with colostomy formation.    Problem List/Assessment and Plan:     1.  Recurrent diverticulitis with perforation status post sigmoid colectomy and colostomy: Patient has had recurrent episodes of diverticulitis.  She was recently treated with antibiotics.  He had worsening abdominal pain and was ultimately found to have perforation.  She is status post sigmoid colectomy and colostomy on 3/31.  She remains n.p.o. at this time.  -Defer diet, activity and prophylaxis to surgical team (ok for PO meds per surgery)     2.  Hirsutism: Prior to admission the patient was on Spironolactone, this has been resumed.     3.  Anxiety and depression: Prior to admission patient was on Vilazodone, Wellbutrin and as needed Xanax.  These have been resumed.     4.  GERD: PO PPI.     5.  Hypothyroidism: Resume Synthroid.    6. Essential Tremor: Resume Propranolol.    # Pain Assessment:  Current Pain Score 4/2/2018   Patient currently in pain? -   Pain score (0-10) 4   Pain location -   Pain descriptors -   Defer to primary team      DVT Prophylaxis: Defer to primary service  Code Status: Full Code  Discharge Dispo: Home  Estimated Disch Date / # of Days until Disch: Defer to primary team        Interval History (Subjective):      Patient was seen with her  this morning.  She is happy that her psychiatric medications can be restarted today.  Pain is well controlled.  No flatus yet.  Denies CP or SOB.            "       Physical Exam:      Last Vital Signs:  /58 (BP Location: Right arm)  Pulse 58  Temp 98.6  F (37  C) (Oral)  Resp 18  Ht 1.626 m (5' 4\")  Wt 108.9 kg (240 lb 1.6 oz)  SpO2 95%  BMI 41.21 kg/m2    General: Alert, awake, no acute distress.  HEENT: Normocephalic and atraumatic, eyes anicteric and without scleral injection, EOMI, face symmetric, MMM.  Cardiac: RRR, normal S1, S2. No m/g/r, no LE edema.  Pulmonary: Normal chest rise, normal work of breathing.  Lungs CTAB without crackles or wheezing.  Abdomen: soft, non-distended.  Hypoactive bowel sounds, no guarding or rebound tenderness. Ostomy and drain in place.  Extremities: no deformities.  Warm, well perfused.  Skin: no rashes or lesions.  Warm and Dry.  Neuro: No focal deficits.  Speech clear.  Spontaneously moving all extremities in bed.  Psych: Alert and oriented x3. Appropriate affect.          Medications:      All current medications were reviewed with changes reflected in problem list.         Data:      All new lab and imaging data was reviewed.   Labs:    Recent Labs  Lab 04/01/18  0626 03/31/18 1817 03/31/18  1033 03/30/18  1158     --  133 135   POTASSIUM 4.5  --  3.9 3.9   CHLORIDE 106  --  102 102   CO2 26  --  26 25   ANIONGAP 4  --  5 8   *  --  134* 155*   BUN 10  --  12 11   CR 0.79 0.85 0.82 0.80   GFRESTIMATED 72 66 68 71   GFRESTBLACK 87 80 83 86   JESSICA 8.6  --  9.2 9.3       Recent Labs  Lab 04/01/18  0626 03/31/18  1817 03/31/18  1033 03/30/18  1158   WBC 19.6*  --  20.8* 24.7*   HGB 11.5*  --  13.0 13.5   HCT 35.9  --  40.1 40.5   *  --  100 98    256 285 307      Imaging:   No results found for this or any previous visit (from the past 24 hour(s)).    Cass Bey MD.         "

## 2018-04-02 NOTE — PROGRESS NOTES
Surgery-[place]  POD#2 s/p ex lap/sigmoid colectomy, colostomy and peritoneal washout for complicated diverticulitis with perforation.  Operative findings reviewed.  Feels okay, some upper abdominal pain, no nausea, starting to feel a lot of gas movement. Hartman out this AM.  No fevers, VSS  105JP  30 stool  NAD, up in bed, comfortable appearing  Abd soft, ostomy pink and protruding slightly, gas and stool in pouch, rare bowel tones, midline wound unpacked and left covered.  Doing well, some early signs of ileus resolution  Okay for clears  Okay to restart appropriate po meds  Continue PCA  Continue Zosyn  OOB  SCDs/SC lovenox for VTE prophylaxis.

## 2018-04-02 NOTE — PLAN OF CARE
Problem: Patient Care Overview  Goal: Plan of Care/Patient Progress Review  Outcome: Improving  Patient alert and oriented x4. Patient POD 2 from cholestomy. Patient did not use PCA pump during shift. Patient is A1 for mobility. Patient on clear liquids, tolerating well. Patient had daughter in to wash hair. CARMENCITA stripped x2 per orders. Patient had urine output x2. Patient able to make needs known. Patient's dressing's intact, next shift will change the dressing. Patient educated on how to empty ostomy bag. Patient verbalized understanding and assisted writer to empty bag.

## 2018-04-03 LAB
COPATH REPORT: NORMAL
PLATELET # BLD AUTO: 325 10E9/L (ref 150–450)

## 2018-04-03 PROCEDURE — 25000132 ZZH RX MED GY IP 250 OP 250 PS 637: Mod: GY | Performed by: SURGERY

## 2018-04-03 PROCEDURE — 25000128 H RX IP 250 OP 636: Performed by: PHYSICIAN ASSISTANT

## 2018-04-03 PROCEDURE — A9270 NON-COVERED ITEM OR SERVICE: HCPCS | Mod: GY | Performed by: SURGERY

## 2018-04-03 PROCEDURE — 36416 COLLJ CAPILLARY BLOOD SPEC: CPT | Performed by: SURGERY

## 2018-04-03 PROCEDURE — 25000125 ZZHC RX 250: Performed by: SURGERY

## 2018-04-03 PROCEDURE — 85049 AUTOMATED PLATELET COUNT: CPT | Performed by: SURGERY

## 2018-04-03 PROCEDURE — 25000132 ZZH RX MED GY IP 250 OP 250 PS 637: Mod: GY | Performed by: INTERNAL MEDICINE

## 2018-04-03 PROCEDURE — 25000125 ZZHC RX 250: Performed by: INTERNAL MEDICINE

## 2018-04-03 PROCEDURE — G0463 HOSPITAL OUTPT CLINIC VISIT: HCPCS

## 2018-04-03 PROCEDURE — 25000128 H RX IP 250 OP 636: Performed by: SURGERY

## 2018-04-03 PROCEDURE — 12000000 ZZH R&B MED SURG/OB

## 2018-04-03 PROCEDURE — 99232 SBSQ HOSP IP/OBS MODERATE 35: CPT | Performed by: INTERNAL MEDICINE

## 2018-04-03 PROCEDURE — A9270 NON-COVERED ITEM OR SERVICE: HCPCS | Mod: GY | Performed by: INTERNAL MEDICINE

## 2018-04-03 RX ADMIN — SODIUM CHLORIDE, POTASSIUM CHLORIDE, SODIUM LACTATE AND CALCIUM CHLORIDE: 600; 310; 30; 20 INJECTION, SOLUTION INTRAVENOUS at 09:45

## 2018-04-03 RX ADMIN — PIPERACILLIN SODIUM AND TAZOBACTAM SODIUM 3.38 G: 3; .375 INJECTION, POWDER, LYOPHILIZED, FOR SOLUTION INTRAVENOUS at 20:44

## 2018-04-03 RX ADMIN — ALPRAZOLAM 0.25 MG: 0.25 TABLET ORAL at 19:17

## 2018-04-03 RX ADMIN — MINERAL OIL AND WHITE PETROLATUM: 150; 830 OINTMENT OPHTHALMIC at 21:19

## 2018-04-03 RX ADMIN — ACETAMINOPHEN 975 MG: 325 TABLET, FILM COATED ORAL at 02:45

## 2018-04-03 RX ADMIN — SODIUM CHLORIDE, POTASSIUM CHLORIDE, SODIUM LACTATE AND CALCIUM CHLORIDE: 600; 310; 30; 20 INJECTION, SOLUTION INTRAVENOUS at 21:22

## 2018-04-03 RX ADMIN — BUPROPION HYDROCHLORIDE 300 MG: 150 TABLET, FILM COATED, EXTENDED RELEASE ORAL at 08:05

## 2018-04-03 RX ADMIN — PIPERACILLIN SODIUM AND TAZOBACTAM SODIUM 3.38 G: 3; .375 INJECTION, POWDER, LYOPHILIZED, FOR SOLUTION INTRAVENOUS at 02:46

## 2018-04-03 RX ADMIN — VILAZODONE HYDROCHLORIDE 40 MG: 20 TABLET ORAL at 08:04

## 2018-04-03 RX ADMIN — ENOXAPARIN SODIUM 40 MG: 40 INJECTION SUBCUTANEOUS at 12:42

## 2018-04-03 RX ADMIN — ONDANSETRON 4 MG: 4 TABLET, ORALLY DISINTEGRATING ORAL at 01:06

## 2018-04-03 RX ADMIN — SPIRONOLACTONE 50 MG: 25 TABLET ORAL at 08:05

## 2018-04-03 RX ADMIN — HYDROMORPHONE HYDROCHLORIDE 0.3 MG: 1 INJECTION, SOLUTION INTRAMUSCULAR; INTRAVENOUS; SUBCUTANEOUS at 22:29

## 2018-04-03 RX ADMIN — PIPERACILLIN SODIUM AND TAZOBACTAM SODIUM 3.38 G: 3; .375 INJECTION, POWDER, LYOPHILIZED, FOR SOLUTION INTRAVENOUS at 15:41

## 2018-04-03 RX ADMIN — PROPRANOLOL HYDROCHLORIDE 80 MG: 80 CAPSULE, EXTENDED RELEASE ORAL at 08:05

## 2018-04-03 RX ADMIN — OMEPRAZOLE 20 MG: 20 CAPSULE, DELAYED RELEASE ORAL at 06:21

## 2018-04-03 RX ADMIN — TRAMADOL HYDROCHLORIDE 50 MG: 50 TABLET, COATED ORAL at 10:08

## 2018-04-03 RX ADMIN — PIPERACILLIN SODIUM AND TAZOBACTAM SODIUM 3.38 G: 3; .375 INJECTION, POWDER, LYOPHILIZED, FOR SOLUTION INTRAVENOUS at 08:09

## 2018-04-03 RX ADMIN — TRAMADOL HYDROCHLORIDE 50 MG: 50 TABLET, COATED ORAL at 19:17

## 2018-04-03 RX ADMIN — SODIUM CHLORIDE, POTASSIUM CHLORIDE, SODIUM LACTATE AND CALCIUM CHLORIDE: 600; 310; 30; 20 INJECTION, SOLUTION INTRAVENOUS at 01:06

## 2018-04-03 RX ADMIN — LEVOTHYROXINE SODIUM 100 MCG: 100 TABLET ORAL at 08:06

## 2018-04-03 RX ADMIN — ACETAMINOPHEN 975 MG: 325 TABLET, FILM COATED ORAL at 09:43

## 2018-04-03 ASSESSMENT — PAIN DESCRIPTION - DESCRIPTORS
DESCRIPTORS: ACHING
DESCRIPTORS: ACHING

## 2018-04-03 ASSESSMENT — ACTIVITIES OF DAILY LIVING (ADL)
ADLS_ACUITY_SCORE: 15
ADLS_ACUITY_SCORE: 11
ADLS_ACUITY_SCORE: 15
ADLS_ACUITY_SCORE: 11

## 2018-04-03 NOTE — PLAN OF CARE
Problem: Patient Care Overview  Goal: Plan of Care/Patient Progress Review  Outcome: Improving  Ambulatory Status:  Pt up SBA. Pt ambulated in the hallway x1 this shift and has been up to the bathroom x3.   VS:  /52, P 60, O2 92%  Pain:  Pt reported aching abdomen pain 5/10. Pt  Given PO Ultram at 1008 with good relief.   Resp: Lungs are clear throughout. IS at bedside and used with encouragement.   GI:  Pt denies any nausea.  Pt was tolerating clear liquids this AM and her diet was advanced to full liquids but she hasn't consumed any yet.  BS present and pt has been passing gas and stool through her colostomy bag.  Pt's BM's have been tan and watery.    : WNL  Skin:  Dressing changes done per orders. Other skin intact. CARMENCITA drain striped x2 this shift.   Tx:  Pt given IV zosyn.   Consults:  Surgery and WOC nurse.  Disposition:  Potential DC 04-

## 2018-04-03 NOTE — PROGRESS NOTES
"[facility] Nurse Inpatient Ostomy Assessment      Assessment of ostomy and needs for:   Colostomy      Data:   History:      Per MD note(s):  69 yo female admitted with Complicated diverticulitis with pneumoperitoneum. 3/31/18: s/p: Exploratory laparotomy with sigmoid colectomy, end colostomy creation and peritoneal washout by Dr Newman    Type of ostomy:  Colostomy  Stoma assessment:   ? Size of stoma: 1 5/8\", rounded, red, moist, edematous, mild protrusion  Mucocutaneous Junction (MCJ): intact  Peristomal skin:  intact  Abdominal assessment: large, round, distended  ? N/G still in place?:  no  Output:  small, runny light brown in pouch; per pt was just emptied  I/O last 3 completed shifts:  In: 2076 [P.O.:200; I.V.:1876]  Out: 3865 [Urine:3600; Drains:140; Stool:125]     Current pouching system:  [place], 2pc piece flat and placed in OR 3/31   Pain:  Cramping   ? Is pt still on a PCA? Yes    Diet:           Active Diet Order      Full Liquid Diet    Education provided    Labs:   Recent Labs   Lab Test  04/01/18   0626   03/23/15   2155   ALBUMIN   --    --   3.9   HGB  11.5*   < >  13.2   WBC  19.6*   < >  12.2*   A1C  6.4   --    --     < > = values in this interval not displayed.           Intervention:     Patient's chart evaluated.      Assessments done today:  Stoma, pouching system, readiness to learn, supply needs.  Pouch changed today Tues 4-3.     Education: steps of pouch change reviewed and demonstrated    Prepared for discharge by: Supplies ordered, Prescriptions or note left on chart for MD to sign/complete, Discussed making a WO Nurse outpatient appointment upon discharge, Discussed when to follow up with a WO Nurse in the future, Discussed how/ where to order supplies and followup in [place].    Pt registered for ostomy supply samples? 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  She has a little prior exposure to ostomies but it was 'eons ago' and most of this is pretty new for her.  Has supportive family around her and will plan to return to [place] when discharged.     Effectiveness of current pouching/ supply plan: TBD, may need light convexity vs flexibility of a 1pc flat         Plan:     Plan:   ? Current pouching system: Cornel 2pc 57mm flat with ring and lock n roll pouch.  Additional supplies in room.  Will order/gather more prior to d/c.     Education:  ? Education provided during hospital stay:  Pouch Emptying and Pouch Replacement, How to cuff and clean pouch, How to empty pouch, Removal of pouch, Preparation of new pouch, Cutting out or evaluating a pattern, Applying paste or rings, Applying appliance to abdomen, Peristomal skin care, Diet and hydration / fluid balance, Odor / flatus management and Lifestyle Adjustments.   o Supply company: TBD- instructed to notify insurance company  o Do [facility] Nurse recommend home care ? Yes - pt wants this as is a little shaky with her hands and is not sure she will be able to do everything herself right away; pt lives in [place]

## 2018-04-03 NOTE — PROGRESS NOTES
Surgery-[place]  POD#3 s/p ex lap/sigmoid colectomy/colostomy and peritoneal washout for complicated diverticulitis with perforation and purulent peritonitis.  Feels okay, tired but with good pain control, no nausea, tolerated clears, stoma working well.  No fevers  NAD, up in chair  CARMENCITA serous  Ostomy pink, stool and gas in pouch  Midline wound gapped, some serous drainage as anticipated  Doing well  Okay for full liquids  D/C PCA  Tramadol at patient's request  Walk  May be ready for d/c tomorrow

## 2018-04-03 NOTE — PLAN OF CARE
Problem: Patient Care Overview  Goal: Plan of Care/Patient Progress Review  Outcome: Improving  POD #3. VSS. Afebrile. LS clear. BS hypoactive, patient is passing gas via ostomy; ostomy having loose brown/tan output; encouraging patient to empty on own. Clear liquid diet, tolerating well; denies nausea. CARMENCITA drain had 15 ml output. PIV - LR @ 100 ml/hr. Getting IV Zosyn. Incision covered, no drainage noted. A&O, calls appropriately for assistance, SBA to ambulate. DC pending.

## 2018-04-04 LAB
BASOPHILS # BLD AUTO: 0 10E9/L (ref 0–0.2)
BASOPHILS NFR BLD AUTO: 0 %
DIFFERENTIAL METHOD BLD: ABNORMAL
EOSINOPHIL # BLD AUTO: 0.7 10E9/L (ref 0–0.7)
EOSINOPHIL NFR BLD AUTO: 4 %
ERYTHROCYTE [DISTWIDTH] IN BLOOD BY AUTOMATED COUNT: 13.2 % (ref 10–15)
HCT VFR BLD AUTO: 36.4 % (ref 35–47)
HGB BLD-MCNC: 11.7 G/DL (ref 11.7–15.7)
LYMPHOCYTES # BLD AUTO: 4.8 10E9/L (ref 0.8–5.3)
LYMPHOCYTES NFR BLD AUTO: 28 %
MCH RBC QN AUTO: 32.4 PG (ref 26.5–33)
MCHC RBC AUTO-ENTMCNC: 32.1 G/DL (ref 31.5–36.5)
MCV RBC AUTO: 101 FL (ref 78–100)
MONOCYTES # BLD AUTO: 1.7 10E9/L (ref 0–1.3)
MONOCYTES NFR BLD AUTO: 10 %
MYELOCYTES # BLD: 0.3 10E9/L
MYELOCYTES NFR BLD MANUAL: 2 %
NEUTROPHILS # BLD AUTO: 9.6 10E9/L (ref 1.6–8.3)
NEUTROPHILS NFR BLD AUTO: 56 %
PLATELET # BLD AUTO: 338 10E9/L (ref 150–450)
PLATELET # BLD EST: ABNORMAL 10*3/UL
RBC # BLD AUTO: 3.61 10E12/L (ref 3.8–5.2)
RBC MORPH BLD: ABNORMAL
VARIANT LYMPHS BLD QL SMEAR: PRESENT
WBC # BLD AUTO: 17.2 10E9/L (ref 4–11)

## 2018-04-04 PROCEDURE — 25000128 H RX IP 250 OP 636: Performed by: PHYSICIAN ASSISTANT

## 2018-04-04 PROCEDURE — 25000132 ZZH RX MED GY IP 250 OP 250 PS 637: Mod: GY | Performed by: SURGERY

## 2018-04-04 PROCEDURE — 25000132 ZZH RX MED GY IP 250 OP 250 PS 637: Mod: GY | Performed by: INTERNAL MEDICINE

## 2018-04-04 PROCEDURE — 40000904 ZZH STATISTIC WOC PT EDUCATION, 46-60 MIN

## 2018-04-04 PROCEDURE — 99232 SBSQ HOSP IP/OBS MODERATE 35: CPT | Performed by: INTERNAL MEDICINE

## 2018-04-04 PROCEDURE — 36415 COLL VENOUS BLD VENIPUNCTURE: CPT | Performed by: INTERNAL MEDICINE

## 2018-04-04 PROCEDURE — 12000000 ZZH R&B MED SURG/OB

## 2018-04-04 PROCEDURE — 25000125 ZZHC RX 250: Performed by: INTERNAL MEDICINE

## 2018-04-04 PROCEDURE — 25000128 H RX IP 250 OP 636: Performed by: SURGERY

## 2018-04-04 PROCEDURE — A9270 NON-COVERED ITEM OR SERVICE: HCPCS | Mod: GY | Performed by: SURGERY

## 2018-04-04 PROCEDURE — 85025 COMPLETE CBC W/AUTO DIFF WBC: CPT | Performed by: INTERNAL MEDICINE

## 2018-04-04 PROCEDURE — A9270 NON-COVERED ITEM OR SERVICE: HCPCS | Mod: GY | Performed by: INTERNAL MEDICINE

## 2018-04-04 RX ORDER — OXYCODONE HYDROCHLORIDE 5 MG/1
5-10 TABLET ORAL EVERY 4 HOURS PRN
Status: DISCONTINUED | OUTPATIENT
Start: 2018-04-04 | End: 2018-04-04

## 2018-04-04 RX ORDER — OXYCODONE HYDROCHLORIDE 5 MG/1
5-10 TABLET ORAL
Status: DISCONTINUED | OUTPATIENT
Start: 2018-04-04 | End: 2018-04-06 | Stop reason: HOSPADM

## 2018-04-04 RX ADMIN — PIPERACILLIN SODIUM AND TAZOBACTAM SODIUM 3.38 G: 3; .375 INJECTION, POWDER, LYOPHILIZED, FOR SOLUTION INTRAVENOUS at 08:43

## 2018-04-04 RX ADMIN — PROPRANOLOL HYDROCHLORIDE 80 MG: 80 CAPSULE, EXTENDED RELEASE ORAL at 08:52

## 2018-04-04 RX ADMIN — TRAMADOL HYDROCHLORIDE 50 MG: 50 TABLET, COATED ORAL at 12:29

## 2018-04-04 RX ADMIN — ENOXAPARIN SODIUM 40 MG: 40 INJECTION SUBCUTANEOUS at 12:30

## 2018-04-04 RX ADMIN — HYDROMORPHONE HYDROCHLORIDE 0.5 MG: 1 INJECTION, SOLUTION INTRAMUSCULAR; INTRAVENOUS; SUBCUTANEOUS at 01:53

## 2018-04-04 RX ADMIN — VILAZODONE HYDROCHLORIDE 40 MG: 20 TABLET ORAL at 08:44

## 2018-04-04 RX ADMIN — PIPERACILLIN SODIUM AND TAZOBACTAM SODIUM 3.38 G: 3; .375 INJECTION, POWDER, LYOPHILIZED, FOR SOLUTION INTRAVENOUS at 22:06

## 2018-04-04 RX ADMIN — OMEPRAZOLE 20 MG: 20 CAPSULE, DELAYED RELEASE ORAL at 06:06

## 2018-04-04 RX ADMIN — PIPERACILLIN SODIUM AND TAZOBACTAM SODIUM 3.38 G: 3; .375 INJECTION, POWDER, LYOPHILIZED, FOR SOLUTION INTRAVENOUS at 15:10

## 2018-04-04 RX ADMIN — PIPERACILLIN SODIUM AND TAZOBACTAM SODIUM 3.38 G: 3; .375 INJECTION, POWDER, LYOPHILIZED, FOR SOLUTION INTRAVENOUS at 02:53

## 2018-04-04 RX ADMIN — OXYCODONE HYDROCHLORIDE 5 MG: 5 TABLET ORAL at 22:13

## 2018-04-04 RX ADMIN — LEVOTHYROXINE SODIUM 100 MCG: 100 TABLET ORAL at 08:44

## 2018-04-04 RX ADMIN — MINERAL OIL AND WHITE PETROLATUM: 150; 830 OINTMENT OPHTHALMIC at 22:07

## 2018-04-04 RX ADMIN — SPIRONOLACTONE 50 MG: 25 TABLET ORAL at 08:44

## 2018-04-04 RX ADMIN — TRAMADOL HYDROCHLORIDE 50 MG: 50 TABLET, COATED ORAL at 06:06

## 2018-04-04 RX ADMIN — BUPROPION HYDROCHLORIDE 300 MG: 150 TABLET, FILM COATED, EXTENDED RELEASE ORAL at 08:44

## 2018-04-04 RX ADMIN — HYDROMORPHONE HYDROCHLORIDE 0.5 MG: 1 INJECTION, SOLUTION INTRAMUSCULAR; INTRAVENOUS; SUBCUTANEOUS at 18:10

## 2018-04-04 ASSESSMENT — ACTIVITIES OF DAILY LIVING (ADL)
ADLS_ACUITY_SCORE: 11

## 2018-04-04 ASSESSMENT — PAIN DESCRIPTION - DESCRIPTORS: DESCRIPTORS: ACHING

## 2018-04-04 NOTE — PROGRESS NOTES
"[facility]  Hospitalist Progress Note  Damaso Melara MD 04/02/18  Hospitalist  Pager [phone]  Text Page until 6 pm (after call answering service)    Reason for Stay (Diagnosis): Perforated Colon         Assessment and Plan:      Summary of Stay: Christina Cotto is a 68 year old female with a history of hypertension, anxiety, depression and recurrent diverticulitis who was admitted 3/31/18 in the setting of recent treatment of diverticulitis with worsening abdominal pain and was found to have perforated colon.  She is status post sigmoid colectomy with colostomy formation.    Problem List/Assessment and Plan:     1.  Recurrent diverticulitis with perforation status post sigmoid colectomy and colostomy: Patient has had recurrent episodes of diverticulitis.  She was recently treated with antibiotics.  He had worsening abdominal pain and was ultimately found to have perforation.  She is status post sigmoid colectomy and colostomy on 3/31.  She remains n.p.o. at this time.  -Defer diet, activity and prophylaxis to surgical team (ok for PO meds per surgery)     2.  Hirsutism: Prior to admission the patient was on Spironolactone, this has been resumed.     3.  Anxiety and depression: Prior to admission patient was on Vilazodone, Wellbutrin and as needed Xanax.  These have been resumed.     4.  GERD: PO PPI.     5.  Hypothyroidism: Resume Synthroid.    6. Essential Tremor: Resume Propranolol.    # Pain Assessment:  Current Pain Score 4/4/2018   Patient currently in pain? yes   Pain score (0-10) 2   Pain location Abdomen   Pain descriptors Aching   Defer to primary team      DVT Prophylaxis: Defer to primary service  Code Status: Full Code  Discharge Dispo: Home  Estimated Disch Date / # of Days until Disch: Defer to primary team, potentially as soon as tomorrow        Interval History (Subjective):      Doing ok, states \"somewhere between fair and horseshit\". Denies cp/sob.                  Physical " "Exam:      Last Vital Signs:  /48 (BP Location: Left arm)  Pulse 53  Temp 97.9  F (36.6  C) (Oral)  Resp 18  Ht 1.626 m (5' 4\")  Wt 108.9 kg (240 lb 1.6 oz)  SpO2 97%  BMI 41.21 kg/m2    General: Alert, awake, no acute distress.  HEENT: Normocephalic and atraumatic, eyes anicteric and without scleral injection, EOMI, face symmetric, MMM.  Cardiac: RRR, normal S1, S2. No m/g/r. Tr edema  Pulmonary: Normal chest rise, normal work of breathing.  Lungs CTAB without crackles or wheezing.  Abdomen: soft, non-distended.  Hypoactive bowel sounds, no guarding or rebound tenderness. Ostomy not examined today by me  Extremities: no deformities.  Warm, well perfused.  Skin: no rashes or lesions.  Warm and Dry.  Neuro: No focal deficits.  Speech clear. Ambulating in hallways.  Psych: Alert and oriented x3. Appropriate affect.          Medications:      All current medications were reviewed with changes reflected in problem list.         Data:      All new lab and imaging data was reviewed.   Labs:    Recent Labs  Lab 04/01/18  0626 03/31/18  1817 03/31/18  1033 03/30/18  1158     --  133 135   POTASSIUM 4.5  --  3.9 3.9   CHLORIDE 106  --  102 102   CO2 26  --  26 25   ANIONGAP 4  --  5 8   *  --  134* 155*   BUN 10  --  12 11   CR 0.79 0.85 0.82 0.80   GFRESTIMATED 72 66 68 71   GFRESTBLACK 87 80 83 86   JESSICA 8.6  --  9.2 9.3       Recent Labs  Lab 04/04/18  0634 04/03/18  0608 04/01/18  0626  03/31/18  1033   WBC 17.2*  --  19.6*  --  20.8*   HGB 11.7  --  11.5*  --  13.0   HCT 36.4  --  35.9  --  40.1   *  --  101*  --  100    325 260  < > 285   < > = values in this interval not displayed.   Imaging:   No results found for this or any previous visit (from the past 24 hour(s)).           "

## 2018-04-04 NOTE — PROGRESS NOTES
"[facility]   General Surgery Progress Note          Assessment and Plan:   Assessment:   POD#4 s/p exploratory laparotomy, sigmoid colectomy with end colotomy for complicated diverticulitis with perforation and peritonitis  WBC trending down      Plan:   Low-residue diet  Pain Mgmt: tramadol, dilaudid prn  Increase activity as tolerated  Continue ABX (Zosyn)  WOC RN following, appreciate assistance  SW following for DC planning, appreciated  Possible DC tomorrow         Interval History:   Sitting up in chair. Continues to do well. Feels fatigued. Some pain when getting up and out of bed as expected. Walking halls. Voiding. Stool in bag. Tolerating full liquids. Does not feel ready to go home yet.         Physical Exam:   Blood pressure 124/48, pulse 53, temperature 97.9  F (36.6  C), temperature source Oral, resp. rate 18, height 1.626 m (5' 4\"), weight 108.9 kg (240 lb 1.6 oz), SpO2 97 %.    I/O last 3 completed shifts:  In: 3719 [P.O.:1060; I.V.:2659]  Out: 3830 [Urine:3400; Drains:155; Stool:275]    Abdomen: soft, ND, mild tenderness, +BS  Inc(s) - midline wound gapped, some serous drainage on bandage as expected  CARMENCITA - serous, removed without complications          Data:     Recent Labs   Lab Test  04/04/18   0634  04/01/18   0626  03/31/18   1033   HGB  11.7  11.5*  13.0   WBC  17.2*  19.6*  20.8*          Juventino Cox PA-C    Pt seen with PA, agree with above  Okay for low res diet, drain removal  Would keep another day given persistent leukocytosis  "

## 2018-04-04 NOTE — PLAN OF CARE
Problem: Patient Care Overview  Goal: Plan of Care/Patient Progress Review  Outcome: Improving  Pt initially in moderate pain 5/10, IV dilaudid x2. Towards morning patient states her pain is more manageable and wants to try PO ultram. +BS, +stool and gas in appliance. CARMENCITA with 35 serous output. Midline CDI. Tolerating sips of water overnight. Up SBA, lungs clear. No significant changes overnight. Slept lightly between cares.

## 2018-04-04 NOTE — PROVIDER NOTIFICATION
Can we switch PO pain medication to dilaudid? Pt states tramadol in effective. IV dilaudid is effective. Admitted with diverticulitis with perforation- POD #4 sigmoid colectomy and colostomy.    thank you

## 2018-04-04 NOTE — CONSULTS
Care Transition Initial Assessment -   Reason For Consult: discharge planning  Met with: Patient    Active Problems:    Diverticulitis of intestine with perforation without abscess or bleeding, unspecified part of intestinal tract       DATA  Lives With: spouse  Living Arrangements: house  Description of Support System: Supportive, Involved  Who is your support system?: , Children-   Support Assessment: Adequate family and caregiver support, Adequate social supports. 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 Pt may stay a few days here to make sure she is able to travel   Identified issues/concerns regarding health management: PT admitted with perforation and ended up with new Ostomy. Pt would like support for HOME Care Rn at [place].       Resources List: Home Care  Will start out using [facility] for support and supplies  Will transfer home care support to [facility] home care is [place] P: [phone] Fax [phone]  PT would like staff to also fax DC ins to her primary care clinic at [place] for follow up    [facility] Ike Caldwell [phone] fax [phone]  Quality Of Family Relationships: supportive     ASSESSMENT  Cognitive Status:  awake, alert and oriented  Concerns to be addressed: Pt is alert, has good support and plans to return to home. Pt would like to have homecare support for her new Ostomy.. Prior to admission pt was impendent with all her ALD>  PT is not sure how may days she will be here prior to her return to home  Will make referral to both home care at [place] for follow up and hand off at [place]     PLAN  Following for anticipate home care support at [place].   Spoke with  pt about need to be homebound once home

## 2018-04-04 NOTE — PROGRESS NOTES
Focus: ostomy education  D/I: 3/31 new Colostomy, education provided to pt and family for 60 min on pouch change demo, skin care, bathing, clothing, diet, fluid, gas, filters, how to empty pouch, supplies, DME, Home health, activities, etc.  A/P: functioning stoma, pouch intact, pt will need assistance when discharged back to [place]. Spouse will not be able to handle the pouch change and pt will need support. Family lives in [place] but will be able to drive her home when discharged.

## 2018-04-04 NOTE — PLAN OF CARE
"Problem: Patient Care Overview  Goal: Plan of Care/Patient Progress Review  Outcome: No Change  Ao, vss. Dressing CDI, Ostomy bag in place. Up SBA.    At 1800, pt CO of feeling \" Crummy and tired\". Nurse assessed vitals, help patient to bed. CO of pain. Stated PO tramadol is ineffective. Surgery paged out, awaiting called back for change in medication. - New pain medication orders in place.      "

## 2018-04-04 NOTE — PLAN OF CARE
Problem: Patient Care Overview  Goal: Plan of Care/Patient Progress Review  Patient is POD 3 with colectomy and ostomy placement. Pain in abdomen managed with tramadol x1 and ice. Patient uses heat for back pain. Dressing changed x1. Patent has open areas between staples in incision with drainage. CARMENCITA drain stripped x2 with output. Patient ambulated in halls and is assist of 1 with mobility. Patient is on full liquid diet with fair intake. Patient took xanax x1 for anxiety. Patient uses IS.

## 2018-04-05 LAB
ERYTHROCYTE [DISTWIDTH] IN BLOOD BY AUTOMATED COUNT: 13.3 % (ref 10–15)
HCT VFR BLD AUTO: 36.1 % (ref 35–47)
HGB BLD-MCNC: 11.5 G/DL (ref 11.7–15.7)
MCH RBC QN AUTO: 32.1 PG (ref 26.5–33)
MCHC RBC AUTO-ENTMCNC: 31.9 G/DL (ref 31.5–36.5)
MCV RBC AUTO: 101 FL (ref 78–100)
PLATELET # BLD AUTO: 336 10E9/L (ref 150–450)
RBC # BLD AUTO: 3.58 10E12/L (ref 3.8–5.2)
WBC # BLD AUTO: 17.8 10E9/L (ref 4–11)

## 2018-04-05 PROCEDURE — 40000905 ZZH STATISTIC WOC PT EDUCATION, > 60 MIN

## 2018-04-05 PROCEDURE — A9270 NON-COVERED ITEM OR SERVICE: HCPCS | Mod: GY | Performed by: SURGERY

## 2018-04-05 PROCEDURE — 25000128 H RX IP 250 OP 636: Performed by: INTERNAL MEDICINE

## 2018-04-05 PROCEDURE — 25000132 ZZH RX MED GY IP 250 OP 250 PS 637: Mod: GY | Performed by: SURGERY

## 2018-04-05 PROCEDURE — 99232 SBSQ HOSP IP/OBS MODERATE 35: CPT | Performed by: INTERNAL MEDICINE

## 2018-04-05 PROCEDURE — 25000132 ZZH RX MED GY IP 250 OP 250 PS 637: Mod: GY | Performed by: INTERNAL MEDICINE

## 2018-04-05 PROCEDURE — 12000000 ZZH R&B MED SURG/OB

## 2018-04-05 PROCEDURE — 25000125 ZZHC RX 250: Performed by: INTERNAL MEDICINE

## 2018-04-05 PROCEDURE — 36415 COLL VENOUS BLD VENIPUNCTURE: CPT | Performed by: INTERNAL MEDICINE

## 2018-04-05 PROCEDURE — 25000128 H RX IP 250 OP 636: Performed by: SURGERY

## 2018-04-05 PROCEDURE — 40000556 ZZH STATISTIC PERIPHERAL IV START W US GUIDANCE

## 2018-04-05 PROCEDURE — 85027 COMPLETE CBC AUTOMATED: CPT | Performed by: INTERNAL MEDICINE

## 2018-04-05 PROCEDURE — A9270 NON-COVERED ITEM OR SERVICE: HCPCS | Mod: GY | Performed by: INTERNAL MEDICINE

## 2018-04-05 RX ORDER — SODIUM CHLORIDE 9 MG/ML
INJECTION, SOLUTION INTRAVENOUS CONTINUOUS
Status: DISCONTINUED | OUTPATIENT
Start: 2018-04-05 | End: 2018-04-06 | Stop reason: HOSPADM

## 2018-04-05 RX ADMIN — OXYCODONE HYDROCHLORIDE 5 MG: 5 TABLET ORAL at 09:59

## 2018-04-05 RX ADMIN — VILAZODONE HYDROCHLORIDE 40 MG: 20 TABLET ORAL at 08:49

## 2018-04-05 RX ADMIN — OXYCODONE HYDROCHLORIDE 5 MG: 5 TABLET ORAL at 15:59

## 2018-04-05 RX ADMIN — PIPERACILLIN SODIUM AND TAZOBACTAM SODIUM 3.38 G: 3; .375 INJECTION, POWDER, LYOPHILIZED, FOR SOLUTION INTRAVENOUS at 21:05

## 2018-04-05 RX ADMIN — PROPRANOLOL HYDROCHLORIDE 80 MG: 80 CAPSULE, EXTENDED RELEASE ORAL at 08:50

## 2018-04-05 RX ADMIN — OXYCODONE HYDROCHLORIDE 10 MG: 5 TABLET ORAL at 01:10

## 2018-04-05 RX ADMIN — SODIUM CHLORIDE: 9 INJECTION, SOLUTION INTRAVENOUS at 21:52

## 2018-04-05 RX ADMIN — SODIUM CHLORIDE: 9 INJECTION, SOLUTION INTRAVENOUS at 16:00

## 2018-04-05 RX ADMIN — MINERAL OIL AND WHITE PETROLATUM: 150; 830 OINTMENT OPHTHALMIC at 20:59

## 2018-04-05 RX ADMIN — OXYCODONE HYDROCHLORIDE 5 MG: 5 TABLET ORAL at 06:02

## 2018-04-05 RX ADMIN — BUPROPION HYDROCHLORIDE 300 MG: 150 TABLET, FILM COATED, EXTENDED RELEASE ORAL at 08:50

## 2018-04-05 RX ADMIN — ENOXAPARIN SODIUM 40 MG: 40 INJECTION SUBCUTANEOUS at 12:12

## 2018-04-05 RX ADMIN — ACETAMINOPHEN 650 MG: 325 TABLET, FILM COATED ORAL at 16:53

## 2018-04-05 RX ADMIN — PIPERACILLIN SODIUM AND TAZOBACTAM SODIUM 3.38 G: 3; .375 INJECTION, POWDER, LYOPHILIZED, FOR SOLUTION INTRAVENOUS at 03:32

## 2018-04-05 RX ADMIN — OMEPRAZOLE 20 MG: 20 CAPSULE, DELAYED RELEASE ORAL at 06:02

## 2018-04-05 RX ADMIN — OXYCODONE HYDROCHLORIDE 5 MG: 5 TABLET ORAL at 08:54

## 2018-04-05 RX ADMIN — OXYCODONE HYDROCHLORIDE 5 MG: 5 TABLET ORAL at 21:05

## 2018-04-05 RX ADMIN — PIPERACILLIN SODIUM AND TAZOBACTAM SODIUM 3.38 G: 3; .375 INJECTION, POWDER, LYOPHILIZED, FOR SOLUTION INTRAVENOUS at 14:30

## 2018-04-05 RX ADMIN — OXYCODONE HYDROCHLORIDE 10 MG: 5 TABLET ORAL at 13:03

## 2018-04-05 RX ADMIN — LEVOTHYROXINE SODIUM 100 MCG: 100 TABLET ORAL at 08:49

## 2018-04-05 RX ADMIN — PIPERACILLIN SODIUM AND TAZOBACTAM SODIUM 3.38 G: 3; .375 INJECTION, POWDER, LYOPHILIZED, FOR SOLUTION INTRAVENOUS at 08:51

## 2018-04-05 RX ADMIN — SPIRONOLACTONE 50 MG: 25 TABLET ORAL at 08:49

## 2018-04-05 ASSESSMENT — ACTIVITIES OF DAILY LIVING (ADL)
ADLS_ACUITY_SCORE: 11

## 2018-04-05 ASSESSMENT — PAIN DESCRIPTION - DESCRIPTORS
DESCRIPTORS: ACHING
DESCRIPTORS: ACHING

## 2018-04-05 NOTE — PLAN OF CARE
Problem: Patient Care Overview  Goal: Plan of Care/Patient Progress Review  Outcome: Improving  Able to empty ostomy with assist of nurse. Pain eased with po meds. Family visiting and suppportive. Continues on iv antibiotic.

## 2018-04-05 NOTE — PLAN OF CARE
Problem: Patient Care Overview  Goal: Plan of Care/Patient Progress Review  Outcome: No Change  Ao, vss. Pain managed by prn medication. Up SBA

## 2018-04-05 NOTE — PROGRESS NOTES
"[facility]   General Surgery Progress Note          Assessment and Plan:   Assessment:   POD#5 s/p exploratory laparotomy, sigmoid colectomy with end colotomy for complicated diverticulitis with perforation and peritonitis  WBC up slightly from yesterday, Afebrile      Plan:   Low-residue diet  Pain Mgmt: tramadol, dilaudid prn  Increase activity as tolerated  Continue ABX (Zosyn)  WOC RN following, appreciate assistance  SW following for DC planning, appreciated  Possible DC tomorrow         Interval History:   Sitting up in chair. Reports later in the day yesterday she began to feel \"crummy\" and notes she feels better today. Tolerating diet without bloating or nausea/vomiting although continues with minimal appetite. Doesn't feel ready to go today and is concerned about elevated WBC. Voiding independently.        Physical Exam:   Blood pressure 121/47, pulse 56, temperature 97.9  F (36.6  C), temperature source Oral, resp. rate 18, height 1.626 m (5' 4\"), weight 108.9 kg (240 lb 1.6 oz), SpO2 94 %.    I/O last 3 completed shifts:  In: 720 [P.O.:720]  Out: 2600 [Urine:2400; Stool:200]    Abdomen: soft, ND, mild tenderness in LLQ and lateral to midline incision, +BS  Inc(s) - midline wound gapped with staples, some serous drainage on bandage as expected, clean and without signs of infection          Data:       Recent Labs  Lab 04/05/18  0632 04/04/18  0634 04/03/18  0608 04/01/18  0626   WBC 17.8* 17.2*  --  19.6*   HGB 11.5* 11.7  --  11.5*   HCT 36.1 36.4  --  35.9   * 101*  --  101*    338 325 260          Juventino Cox PA-C    Pt seen and examined, agree with above  Too early to investigate for abscess, no imaging indicated  Likely home tomorrow on po abx.  "

## 2018-04-05 NOTE — PROGRESS NOTES
Discharge Planner   Discharge Plans in progress: Plan will be to follow up with home care once pt is home.   Barriers to discharge plan: PT hopes to return to home this weekend pending weather  Follow up plan:      04/05/18 1000   Final Resources   Home Care ([facility] home care is [place] P: [phone] Fax [phone])   Per request will also send to primary care MD        Entered by: Corinne C. White 04/05/2018 10:31 AM

## 2018-04-05 NOTE — PROGRESS NOTES
[facility]  Hospitalist Progress Note    Christian Storey MD 04/05/2018  Text Page      Reason for Stay (Diagnosis): Acute diverticulitis with perforation         Assessment and Plan:       Summary of Stay: Christina Cotto is a 68 year old female with a history of hypertension, anxiety, depression and recurrent diverticulitis who was admitted 3/31/18 in the setting of recent treatment of diverticulitis with worsening abdominal pain and was found to have perforated colon.  She is status post sigmoid colectomy with colostomy formation on 3/31.     Problem List/Assessment and Plan:      1.  Recurrent diverticulitis with perforation status post sigmoid colectomy and colostomy: Patient has had recurrent episodes of diverticulitis.  She was recently treated with antibiotics.  He had worsening abdominal pain and was ultimately found to have perforation.  She is status post sigmoid colectomy and colostomy on 3/31.    -Recovering well from postoperative ileus  -She is now tolerating diet which has been advanced to low residue diet per surgery  -Oxycodone as needed  -Continue IV Zosyn.  White blood count virtually the same as yesterday and decreased from 2 days ago.  Will discuss with surgery plans for antibiotics upon discharge.  I discussed with family to monitor for symptoms of infection in the future as she did have perforated colon.  They will monitor for increasing pain or fevers.      2.  Hirsutism: Prior to admission the patient was on Spironolactone, this has been resumed.      3.  Anxiety and depression: Prior to admission patient was on Vilazodone, Wellbutrin and as needed Xanax.  These have been resumed.      4.  GERD: PO PPI.      5.  Hypothyroidism: Resume Synthroid.     6. Essential Tremor: Resume Propranolol.    # Pain Assessment:  Current Pain Score 4/5/2018   Patient currently in pain? -   Pain score (0-10) 3   Pain location -   Pain descriptors -   - Christina is experiencing pain due to  "surgery. Pain management was discussed and the plan was created in a collaborative fashion.  Christina's response to the current recommendations: engaged  - Please see the plan for pain management as documented above          DVT Prophylaxis: Enoxaparin (Lovenox) SQ  Code Status: Full Code  Discharge Dispo: May be home tomorrow if tolerating diet well and white blood cell count decreasing  Incidental Findings/ Discharge Issues: None            Interval History (Subjective):      Having some pain in abdomen when moving around but no pain at rest having stool in the colostomy bag and no significant bloating with eating.                  Physical Exam:      Last Vital Signs:  /44 (BP Location: Left arm)  Pulse 56  Temp 97.8  F (36.6  C) (Oral)  Resp 18  Ht 1.626 m (5' 4\")  Wt 108.9 kg (240 lb 1.6 oz)  SpO2 93%  BMI 41.21 kg/m2      Vital signs reviewed  General:  Alert, calm, NAD  CV: regular rate and rhythm, no murmurs or rubs  Lungs:  Clear to ascultation bilaterally, normal respiratory effort  Abdomen:  Soft, nontender, nondistended, no masses, normal bowel sounds, stool in colostomy  Extremities:  No edema  Neuro: normal strength and sensation in all 4 extremities, cranial nerves grossly intact  Psychiatric:  Mood and affect within normal limits             Medications:      All current medications were reviewed with changes reflected in problem list.         Data:      All new lab and imaging data was reviewed.   Labs:  WBC 17.8 from 17.2 from 19.6  "

## 2018-04-05 NOTE — PLAN OF CARE
Problem: Patient Care Overview  Goal: Plan of Care/Patient Progress Review  Outcome: Improving  Slept well. Up SBA to BR. Voiding well. No stool tonight but +gas in ostomy. Incision dressing moist, serous drainage. Changed with majority of drainage coming from base of incision where packing had been removed. kerlix and island dressing replaced. CARMENCITA out, site CDI. Stoma red, moist. IV TKO. Continues on Zosyn. Oxycodone for pain with tolerable level 3-4. Anticipate home soon when resources have been established for f/u care in [place].

## 2018-04-05 NOTE — PROGRESS NOTES
Focus: ostomy education  D/I: 3/31 new Colostomy, education provided to spouse and daughter for 75 min on pouch change demo, skin care, bathing, clothing, diet, fluid, gas, filters, how to empty pouch, supplies, DME, Home health, activities, etc.  Labs   Recent Labs   Lab Test  04/05/18   0632   04/01/18   0626   03/23/15   2155   ALBUMIN   --    --    --    --   3.9   HGB  11.5*   < >  11.5*   < >  13.2   WBC  17.8*   < >  19.6*   < >  12.2*   A1C   --    --   6.4   --    --     < > = values in this interval not displayed.       A/P: functioning stoma, pouch intact, pt will need assistance when discharged back to [place]. Spouse has a queasy stomach but was shown on a stoma model. He understood all the steps of the pouching change and products. Will continue to work with spouse to support pt when discharged.  Family lives in [place] but will probably be able to drive her home when discharged.   Pt has had complaints of back pain that radiates to shoulder and new pains today on left abdomen near stoma. Family and pt discussed with hospitalist, RNs alerted and will continue to monitor.

## 2018-04-06 VITALS
TEMPERATURE: 98.6 F | BODY MASS INDEX: 40.99 KG/M2 | SYSTOLIC BLOOD PRESSURE: 140 MMHG | WEIGHT: 240.1 LBS | DIASTOLIC BLOOD PRESSURE: 57 MMHG | OXYGEN SATURATION: 95 % | HEART RATE: 56 BPM | RESPIRATION RATE: 18 BRPM | HEIGHT: 64 IN

## 2018-04-06 LAB
BACTERIA SPEC CULT: ABNORMAL
ERYTHROCYTE [DISTWIDTH] IN BLOOD BY AUTOMATED COUNT: 13.4 % (ref 10–15)
HCT VFR BLD AUTO: 34 % (ref 35–47)
HGB BLD-MCNC: 11 G/DL (ref 11.7–15.7)
MCH RBC QN AUTO: 32.7 PG (ref 26.5–33)
MCHC RBC AUTO-ENTMCNC: 32.4 G/DL (ref 31.5–36.5)
MCV RBC AUTO: 101 FL (ref 78–100)
PLATELET # BLD AUTO: 336 10E9/L (ref 150–450)
RBC # BLD AUTO: 3.36 10E12/L (ref 3.8–5.2)
SPECIMEN SOURCE: ABNORMAL
WBC # BLD AUTO: 16.2 10E9/L (ref 4–11)

## 2018-04-06 PROCEDURE — 25000128 H RX IP 250 OP 636: Performed by: SURGERY

## 2018-04-06 PROCEDURE — 99239 HOSP IP/OBS DSCHRG MGMT >30: CPT | Performed by: INTERNAL MEDICINE

## 2018-04-06 PROCEDURE — A9270 NON-COVERED ITEM OR SERVICE: HCPCS | Mod: GY | Performed by: SURGERY

## 2018-04-06 PROCEDURE — 25000132 ZZH RX MED GY IP 250 OP 250 PS 637: Mod: GY | Performed by: INTERNAL MEDICINE

## 2018-04-06 PROCEDURE — 25000132 ZZH RX MED GY IP 250 OP 250 PS 637: Mod: GY | Performed by: SURGERY

## 2018-04-06 PROCEDURE — G0463 HOSPITAL OUTPT CLINIC VISIT: HCPCS

## 2018-04-06 PROCEDURE — A9270 NON-COVERED ITEM OR SERVICE: HCPCS | Mod: GY | Performed by: INTERNAL MEDICINE

## 2018-04-06 PROCEDURE — 85027 COMPLETE CBC AUTOMATED: CPT | Performed by: PHYSICIAN ASSISTANT

## 2018-04-06 PROCEDURE — 36415 COLL VENOUS BLD VENIPUNCTURE: CPT | Performed by: PHYSICIAN ASSISTANT

## 2018-04-06 RX ORDER — ONDANSETRON 4 MG/1
4-8 TABLET, ORALLY DISINTEGRATING ORAL
Qty: 20 TABLET | Refills: 1 | Status: SHIPPED | OUTPATIENT
Start: 2018-04-06 | End: 2018-06-04

## 2018-04-06 RX ADMIN — OMEPRAZOLE 20 MG: 20 CAPSULE, DELAYED RELEASE ORAL at 06:15

## 2018-04-06 RX ADMIN — SPIRONOLACTONE 50 MG: 25 TABLET ORAL at 09:00

## 2018-04-06 RX ADMIN — PIPERACILLIN SODIUM AND TAZOBACTAM SODIUM 3.38 G: 3; .375 INJECTION, POWDER, LYOPHILIZED, FOR SOLUTION INTRAVENOUS at 02:57

## 2018-04-06 RX ADMIN — BUPROPION HYDROCHLORIDE 300 MG: 150 TABLET, FILM COATED, EXTENDED RELEASE ORAL at 08:59

## 2018-04-06 RX ADMIN — OXYCODONE HYDROCHLORIDE 5 MG: 5 TABLET ORAL at 08:59

## 2018-04-06 RX ADMIN — PROPRANOLOL HYDROCHLORIDE 80 MG: 80 CAPSULE, EXTENDED RELEASE ORAL at 08:59

## 2018-04-06 RX ADMIN — OXYCODONE HYDROCHLORIDE 5 MG: 5 TABLET ORAL at 00:49

## 2018-04-06 RX ADMIN — PIPERACILLIN SODIUM AND TAZOBACTAM SODIUM 3.38 G: 3; .375 INJECTION, POWDER, LYOPHILIZED, FOR SOLUTION INTRAVENOUS at 08:59

## 2018-04-06 RX ADMIN — VILAZODONE HYDROCHLORIDE 40 MG: 20 TABLET ORAL at 08:59

## 2018-04-06 RX ADMIN — LEVOTHYROXINE SODIUM 100 MCG: 100 TABLET ORAL at 08:59

## 2018-04-06 ASSESSMENT — ACTIVITIES OF DAILY LIVING (ADL)
ADLS_ACUITY_SCORE: 11

## 2018-04-06 ASSESSMENT — PAIN DESCRIPTION - DESCRIPTORS: DESCRIPTORS: CRAMPING;SHARP

## 2018-04-06 NOTE — DISCHARGE INSTRUCTIONS
CM: [facility] [place] P: [phone] Fax [phone]    HOME CARE FOLLOWING ABDOMINAL SURGERY  GEOFF Jolly N. Guttormson, D. Maurer, NARDA Shelley    INCISIONAL CARE:  Replace the bandage over your incision (or incisions) until all drainage stops.  If present, leave the steri-strips (white paper tapes) in place for 14 days after surgery.  If you have staples in your incision at the time of discharge, they will be removed at your follow-up appointment.  If Dermabond (a type of skin glue) is present, leave in place until it wears/flakes off.  Change the appliance and care for the ostomy as instructed by the inpatient ostomy nurse.    BATHING:  Avoid baths for 1 week after surgery.  Showers are okay.  You may wash your hair at any time.  Gently pat your incision dry after bathing.    ACTIVITY:  Light Activity -- you may immediately be up and about as tolerated.  Driving -- you may drive when comfortable and off narcotic pain medications.  Light Work -- resume when comfortable off pain medications.  (If you can drive, you probably can work.)  Strenuous Work/Activity -- limit lifting to 20 pounds for 4 weeks.  Then, progressively increase with time.  Active Sports (running, biking, etc.) -- cautiously resume after 6 weeks.    DISCOMFORT:  Use pain medications as prescribed by your surgeon.  Take the pain medication with some food, when possible, to minimize side effects.  Expect gradual improvement.    DIET:  Return to diet you were on before surgery, unless you are given specific diet instructions.  Drink plenty of fluids.  While taking pain medications, increase dietary fiber or add a fiber supplementation like Metamucil or Citrucel to help prevent constipation - a possible side effect of pain medications.    NAUSEA:  If nauseated from the anesthetic/pain meds; rest in bed, get up cautiously with assistance, and drink clear liquids (juice, tea, broth).    RETURN APPOINTMENT:  Schedule a  follow-up visit 2-3 weeks after discharge from the hospital.  Office Phone:  [phone]     CONTACT US IF THE FOLLOWING DEVELOPS:   1. A fever that is above 101     2. If there is a large amount of drainage, bleeding, or swelling.   3. Severe pain that is not relieved by your prescription.   4. Drainage that is thick, cloudy, yellow, green or white.   5. Any other questions not answered by  Frequently Asked Questions  sheet.      FREQUENTLY ASKED QUESTIONS:    Q:  How should my incision look?    A:  Normally your incision will appear slightly swollen with light redness directly along the incision itself as it heals.  It may feel like a bump or ridge as the healing/scarring happens, and over time (3-4 months) this bump or ridge feeling should slowly go away.  In general, clear or pink watery drainage can be normal at first as your incision heals, but should decrease over time.    Q:  How do I know if my incision is infected?  A:  Look at your incision for signs of infection, like redness around the incision spreading to surrounding skin, or drainage of cloudy or foul-smelling drainage.  If you feel warm, check your temperature to see if you are running a fever.    **If any of these things occur, please notify the nurse at our office.  We may need you to come into the office for an incision check.      Q:  How do I take care of my incision?  A:  If you have a dressing in place - Starting the day after surgery, replace the dressing 1-2 times a day until there is no further drainage from the incision.  At that time, a dressing is no longer needed.  Try to minimize tape on the skin if irritation is occurring at the tape sites.  If you have significant irritation from tape on the skin, please call the office to discuss other method of dressing your incision.    Small pieces of tape called  steri-strips  may be present directly overlying your incision; these may be removed 10 days after surgery unless otherwise specified  by your surgeon.  If these tapes start to loosen at the ends, you may trim them back until they fall off or are removed.    A:  If you had  Dermabond  tissue glue used as a dressing (this causes your incision to look shiny with a clear covering over it) - This type of dressing wears off with time and does not require more dressings over the top unless it is draining around the glue as it wears off.  Do not apply ointments or lotions over the incisions until the glue has completely worn off.    Q:  There is a piece of tape or a sticky  lead  still on my skin.  Can I remove this?  A:  Sometimes the sticky  leads  used for monitoring during surgery or for evaluation in the emergency department are not all removed while you are in the hospital.  These sometimes have a tab or metal dot on them.  You can easily remove these on your own, like taking off a band-aid.  If there is a gel substance under the  lead , simply wipe/clean it off with a washcloth or paper towel.      Q:  What can I do to minimize constipation (very hard stools, or lack of stools)?  A:  Stay well hydrated.  Increase your dietary fiber intake or take a fiber supplement -with plenty of water.  Walk around frequently.  You may consider an over-the-counter stool-softener.  Your Pharmacist can assist you with choosing one that is stocked at your pharmacy.  Constipation is also one of the most common side effects of pain medication.  If you are using pain medication, be pro-active and try to PREVENT problems with constipation by taking the steps above BEFORE constipation becomes a problem.    Q:  What do I do if I need more pain medications?  A:  Call the office to receive refills.  Be aware that certain pain meds cannot be called into a pharmacy and actually require a paper prescription.  A change may be made in your pain med as you progress thru your recovery period or if you have side effects to certain meds.    --Pain meds are NOT refilled after 5pm on  weekdays, and NOT AT ALL on the weekends, so please look ahead to prevent problems.      Q:  Why am I having a hard time sleeping now that I am at home?  A:  Many medications you receive while you are in the hospital can impact your sleep for a number of days after your surgery/hospitalization.  Decreased level of activity and naps during the day may also make sleeping at night difficult.  Try to minimize day-time naps, and get up frequently during the day to walk around your home during your recovery time.  Sleep aides may be of some help, but are not recommended for long-term use.      Q:  I am having some back discomfort.  What should I do?  A:  This may be related to certain positioning that was required for your surgery, extended periods of time in bed, or other changes in your overall activity level.  You may try ice, heat, acetaminophen, or ibuprofen to treat this temporarily.  Note that many pain medications have acetaminophen in them and would state this on the prescription bottle.  Be sure not to exceed the maximum of 4000mg per day of acetaminophen.     **If the pain you are having does not resolve, is severe, or is a flare of back pain you have had on other occasions prior to surgery, please contact your primary physician for further recommendations or for an appointment to be examined at their office.    Q:  Why am I having headaches?  A:  Headaches can be caused by many things:  caffeine withdrawal, use of pain meds, dehydration, high blood pressure, lack of sleep, over-activity/exhaustion, flare-up of usual migraine headaches.  If you feel this is related to muscle tension (a band-like feeling around the head, or a pressure at the low-back of the head) you may try ice or heat to this area.  You may need to drink more fluids (try electrolyte drink like Gatorade), rest, or take your usual migraine medications.   **If your headaches do not resolve, worsen, are accompanied by other symptoms, or if your  blood pressure is high, please call your primary physician for recommendation and/or examination.    Q:  I am unable to urinate.  What do I do?  A:  A small percentage of people can have difficulty urinating initially after surgery.  This includes being able to urinate only a very small amount at a time and feeling discomfort or pressure in the very low abdomen.  This is called  urinary retention , and is actually an urgent situation.  Proceed to your nearest Emergency department for evaluation (not an Urgent Care Center).  Sometimes the bladder does not work correctly after certain medications you receive during surgery, or related to certain procedures.  You may need to have a catheter placed until your bladder recovers.  When planning to go to an Emergency department, it may help to call the ER to let them know you are coming in for this problem after a surgery.  This may help you get in quicker to be evaluated.  **If you have symptoms of a urinary tract infection, please contact your primary physician for the proper evaluation and treatment.          If you have other questions, please call the office Monday thru Friday between 8am and 5pm to discuss with the nurse or physician assistant.  #[phone]    There is a surgeon ON CALL on weekday evenings and over the weekend in case of urgent need only, and may be contacted at the same number.    If you are having an emergency, call 911 or proceed to your nearest emergency department.

## 2018-04-06 NOTE — PROGRESS NOTES
Patient hospitalized for collectomy. Cleared for discharge to home today per MD. Discharge instructions, medications, and follow-ups reviewed with patient and daughter and spouse in detail. Discharge medications, including oxycodone were filled at [facility] pharmacy. Patient and daughter and spouse verbalized understanding of discharge instructions. Belongings were returned to patient at time of discharge. Spouse providing transport home.

## 2018-04-06 NOTE — PROGRESS NOTES
"[facility] Nurse Inpatient Ostomy Assessment      Assessment of ostomy and needs for:   Colostomy      Data:   History:      Per MD note(s):  67 yo female admitted with Complicated diverticulitis with pneumoperitoneum. 3/31/18: s/p: Exploratory laparotomy with sigmoid colectomy, end colostomy creation and peritoneal washout by Dr Newman.   Ready for discharge today.    Type of ostomy:  Colostomy  Stoma assessment:   ? Size of stoma: 1 1/2\", round, red, moist, edematous, mild protrusion  Mucocutaneous Junction (MCJ): intact with sutures  Peristomal skin:  intact  Abdominal assessment: large, round, distended, slightly bruised  Output:  small, runny light brown in pouch;    Intake/Output Summary (Last 24 hours) at 04/06/18 1452  Last data filed at 04/06/18 0632   Gross per 24 hour   Intake             1802 ml   Output             2355 ml   Net             -553 ml     Current pouching system:  Cornel, 2pc piece flat   ? Pain: no complaints    Diet:         Active Diet Order      Low Fiber Diet      Diet    Education provided    Labs:   Recent Labs   Lab Test  04/06/18   0621   04/01/18   0626   03/23/15   2155   ALBUMIN   --    --    --    --   3.9   HGB  11.0*   < >  11.5*   < >  13.2   WBC  16.2*   < >  19.6*   < >  12.2*   A1C   --    --   6.4   --    --     < > = values in this interval not displayed.           Intervention:     Patient's chart evaluated.      Assessments done today:  Final assessment of Stoma, pouching system, ability to change, skin care, Rx and supply needs.       Final Education with pt, spouse and family: pouch change and demo of all steps of pouch change, all questions answered,     Abdominal dressing applied to cover open midline surgical wound    Prepared for discharge by: Supplies ordered and ready for D/C, Rx prepared and signed, followup with a  [facility] Nurse outpatient appointment upon discharge, Discussed when to follow up with a [facility] Nurse in the future, Discussed " how/ where to order supplies and followup in [place].    Pt registered for ostomy supply samples? 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 Understands steps but has difficulty visualizing area due to girth, pt has supportive family around her and will plan to return to [place] when discharged.     Effectiveness of current pouching/ supply plan: TBD, may need light convexity vs flexibility of a 1pc flat         Plan:     Plan:   ? Current pouching system: Coloplast 1  pc ; flat with ring;   Additional supplies provided.      Education Completed:   ? Education provided during hospital stay:  Pouch Emptying and Pouch Replacement, How to cuff and clean pouch, How to empty pouch, Removal of pouch, Preparation of new pouch, Cutting out or evaluating a pattern, Applying paste or rings, Applying appliance to abdomen, Peristomal skin care, Diet and hydration / fluid balance, Odor / flatus management and Lifestyle Adjustments.   o Supply company: TBD- instructed to notify insurance company and plans to use a DME in [place]  o Do [facility] Nurse recommend home care ? Yes -arranged in [place]

## 2018-04-06 NOTE — DISCHARGE SUMMARY
Admit Date:     03/31/2018   Discharge Date:     04/06/2018      DISPOSITION:  Discharged to home.      DISCHARGE DIAGNOSES:   1.  Perforated diverticulitis with peritonitis, resulting in sigmoid colectomy and colostomy.   2.  Anxiety.   3.  Depression.   4.  Gastroesophageal reflux disease.   5.  Essential tremor.   6.  Hypothyroidism.      PROCEDURES:   1.  General Surgery consultation.   2.  Sigmoid colectomy and colostomy placement.   3.  CT of the abdomen and pelvis, which showed diverticulitis with abscess formation and collections of free air in the peritoneal cavity.      ALLERGIES:  NO KNOWN DRUG ALLERGIES.      PENDING TEST RESULTS:  None.      PRIMARY CARE PROVIDER:  Dr. Ike Caldwell in [place], [place].      HOSPITAL COURSE:   1.  Perforated diverticulitis with peritonitis.  Ms. Cotto is a 68-year-old woman who was here visiting her family from [place], [place].  She had been seen in the Emergency Department on 03/30/2018 for abdominal pain and diagnosed with diverticulitis.  She was started on an antibiotic; however, she had significant worsening and came back to the Emergency Department the next day.  On repeat CT, she was found to have a perforation of her diverticulitis, which was not there the day previously.  She was seen by General Surgery and had urgent sigmoid colectomy and colostomy placement.  She had extensive washout and was started on IV antibiotics.  Fortunately, she tolerated the procedure well and has had no evidence of lingering or recurrent infection postoperatively.  She will complete a course of antibiotics and follow up with Dr. Newman in clinic next week for reassessment.  She is tolerating oral intake well and her colostomy is working as expected.      DISCHARGE MEDICATIONS:   1.  Oxycodone 1-2 tablets every 4 hours as needed.   2.  Augmentin 875 mg twice a day for 7 days.   3.  Zofran as needed.   4.  Viibryd 40 mg a day.   5.  Levothyroxine 100 mcg a day.    6.  Cyclobenzaprine 5 mg daily as needed.   7.  Wellbutrin- mg daily.   8.  Propranolol LA 80 mg daily.   9.  Ascorbic acid 1000 mg daily.   10.  Multivitamin 1 tab per day.   11.  Calcium carbonate with vitamin D 1 tablet daily.   12.  Polyethylene glycol 1 drop both eyes 4 times daily as needed.   13.  Systane eyedrops 1 drop in both eyes 4 times a day as needed.   14.  Artificial tears ointment to eyes at bedtime.   15.  Xanax 0.25 mg 3 times a day as needed for anxiety.   16.  Aspirin 81 mg a day.   17.  Prilosec 20 mg a day.   18.  Spironolactone 50 mg daily.      FOLLOWUP APPOINTMENTS:     1.  With Dr. Newman in Surgery Clinic within 1 week.      OTHER INSTRUCTIONS:  Home health nurse for further instructions regarding ostomy management.         RJ MARROQUIN MD             D: 2018   T: 2018   MT: LAMONT      Name:     GONZALO MA   MRN:      [id number]        Account:        [id number]   :      1950           Admit Date:     2018                                  Discharge Date: 2018      Document: [id number]       cc: Ike Caldwell MD

## 2018-04-06 NOTE — PLAN OF CARE
Problem: Patient Care Overview  Goal: Plan of Care/Patient Progress Review  Pt A&O x4 with SBA. Calls appropriately for assistance. VSS and afebrile Complained of sharp pain in L. Side. Oxycodone 5mg given x1. Denies nausea/vomiting. Stoma WDL with 30ml tan/brown output. Dressings intact with serous drainage. WBC increased to 17.8 - ultrasound today. Pt is emptying ostomy on her own with minimal assistance. WOC nurse is following. SW involved with possible DC today with [facility].

## 2018-04-06 NOTE — PROGRESS NOTES
Surgery-[facility]  POD#6 s/p ex lap, sigmoid colectomy, colostomy for complicated diverticulitis with perforation  Feels okay, still some incisional pain, tolerating diet, wants to shower  No fevers, VSS  WBC 16K  NAD, comfortable  Abd soft, ostomy pink, gas and stool in pouch, good bowel tones, midline wound gapped, some serous drainage without cellulitis  Doing well, appropriate for discharge  Diet ad jose  Po oxycodone and Augmentin (7d) written  RTC next week for staple removal  Appropriate for d/c today.

## 2018-04-06 NOTE — PLAN OF CARE
Problem: Pain, Acute (Adult)  Goal: Identify Related Risk Factors and Signs and Symptoms  Related risk factors and signs and symptoms are identified upon initiation of Human [employer] Clinical Practice Guideline (CPG).  Pt up walking in halls with assist of one. Sits up in chair. Empties ostomy independently. Stool and flatus in appliance. Tolerating low fiber diet. Pain controlled with Oxycodone.

## 2018-04-07 LAB
BACTERIA SPEC CULT: ABNORMAL
Lab: ABNORMAL
SPECIMEN SOURCE: ABNORMAL

## 2018-04-12 ENCOUNTER — OFFICE VISIT (OUTPATIENT)
Dept: SURGERY | Facility: CLINIC | Age: 68
End: 2018-04-12
Payer: MEDICARE

## 2018-04-12 VITALS
OXYGEN SATURATION: 96 % | SYSTOLIC BLOOD PRESSURE: 120 MMHG | BODY MASS INDEX: 40.97 KG/M2 | WEIGHT: 240 LBS | RESPIRATION RATE: 16 BRPM | HEIGHT: 64 IN | DIASTOLIC BLOOD PRESSURE: 70 MMHG | HEART RATE: 96 BPM

## 2018-04-12 DIAGNOSIS — Z09 SURGICAL FOLLOWUP VISIT: Primary | ICD-10-CM

## 2018-04-12 PROCEDURE — 99024 POSTOP FOLLOW-UP VISIT: CPT | Performed by: SURGERY

## 2018-04-12 NOTE — MR AVS SNAPSHOT
"              After Visit Summary   2018    Christina Cotto    MRN: [id number]           Patient Information     Date Of Birth          1950        Visit Information        Provider Department      2018 11:45 AM Rodrigo Claire MD [facility] General Surgery      Today's Diagnoses     Surgical followup visit    -  1       Follow-ups after your visit        Who to contact     If you have questions or need follow up information about today's clinic visit or your schedule please contact [facility] directly at [phone].  Normal or non-critical lab and imaging results will be communicated to you by EPShart, letter or phone within 4 business days after the clinic has received the results. If you do not hear from us within 7 days, please contact the clinic through [facility] or phone. If you have a critical or abnormal lab result, we will notify you by phone as soon as possible.  Submit refill requests through Weiju or call your pharmacy and they will forward the refill request to us. Please allow 3 business days for your refill to be completed.          Additional Information About Your Visit        MyChart Information     Weiju lets you send messages to your doctor, view your test results, renew your prescriptions, schedule appointments and more. To sign up, go to [email or web address] . Click on \"Log in\" on the left side of the screen, which will take you to the Welcome page. Then click on \"Sign up Now\" on the right side of the page.     You will be asked to enter the access code listed below, as well as some personal information. Please follow the directions to create your username and password.     Your access code is: [email or web address]  Expires: 2018  1:29 PM     Your access code will  in 90 days. If you need help or a new code, please call your [place] clinic or [phone].        Care EveryWhere ID     This is " "your Care EveryWhere ID. This could be used by other organizations to access your [place] medical records  [id number]        Your Vitals Were     Pulse Respirations Height Pulse Oximetry Breastfeeding? BMI (Body Mass Index)    96 16 5' 4\" (1.626 m) 96% No 41.2 kg/m2       Blood Pressure from Last 3 Encounters:   04/12/18 120/70   04/06/18 140/57   03/30/18 117/65    Weight from Last 3 Encounters:   04/12/18 240 lb (108.9 kg)   03/31/18 240 lb 1.6 oz (108.9 kg)              Today, you had the following     No orders found for display       Primary Care Provider Office Phone # Fax #    Ike Caldwell [phone]       [address] [address]        Equal Access to Services     [facility] : Hadii duncan brady hadasho Soomaali, waaxda luqadaha, qaybta kaalmada adeegyada, janice prieto hayannabel rosario . So [facility] [phone].    ATENCIÓN: Si habla español, tiene a mariscal disposición servicios gratuitos de asistencia lingüística. Kait al [phone].    We comply with applicable federal civil rights laws and [place] laws. We do not discriminate on the basis of race, color, national origin, age, disability, sex, sexual orientation, or gender identity.            Thank you!     Thank you for choosing [facility]  for your care. Our goal is always to provide you with excellent care. Hearing back from our patients is one way we can continue to improve our services. Please take a few minutes to complete the written survey that you may receive in the mail after your visit with us. Thank you!             Your Updated Medication List - Protect others around you: Learn how to safely use, store and throw away your medicines at www.disposemymeds.org.          This list is accurate as of 4/12/18 12:01 PM.  Always use your most recent med list.                   Brand Name Dispense Instructions for use Diagnosis    ALDACTONE PO      Take 50 mg by mouth " daily        amoxicillin-clavulanate 875-125 MG per tablet    AUGMENTIN    14 tablet    Take 1 tablet by mouth 2 times daily for 7 days    Diverticulitis of intestine with perforation without abscess or bleeding, unspecified part of intestinal tract       ascorbic acid 500 MG Tabs      Take 1,000 mg by mouth daily        aspirin 81 MG chewable tablet      Take 81 mg by mouth daily        buPROPion 300 MG 24 hr tablet    WELLBUTRIN XL     Take by mouth every morning        Calcium carb-Vitamin D 500 mg [place]-200 units 500-200 MG-UNIT per tablet    OSCAL with D;Oyster Shell Calcium     Take 1 tablet by mouth daily        CYCLOBENZAPRINE HCL PO      Take 5 mg by mouth daily as needed        LEVOTHYROXINE SODIUM PO      Take 100 mcg by mouth daily        multivitamin, therapeutic Tabs tablet      Take 1 tablet by mouth daily        ondansetron 4 MG ODT tab    ZOFRAN ODT    20 tablet    Take 1-2 tablets (4-8 mg) by mouth 3 times daily (before meals)    Diverticulitis of intestine with perforation without abscess or bleeding, unspecified part of intestinal tract       oxyCODONE HCl 5 MG Taba    OXAYDO    15 each    Take 1-2 tablets by mouth every 4 hours as needed (pain)    Diverticulitis of intestine with perforation without abscess or bleeding, unspecified part of intestinal tract       polyethylene glycol 0.4%- propylene glycol 0.3% 0.4-0.3 % Soln ophthalmic solution    SYSTANE ULTRA     Place 1 drop into both eyes 4 times daily as needed for dry eyes        PRILOSEC PO      Take 20 mg by mouth every morning        propranolol 80 MG 24 hr capsule    INDERAL LA     Take 80 mg by mouth daily For tremor        REFRESH P.M. Oint      Apply to eye At Bedtime        SYSTANE BALANCE 0.6 % Soln ophthalmic solution   Generic drug:  propylene glycol      Place 1 drop into both eyes 4 times daily as needed for dry eyes        VIIBRYD PO      Take 40 mg by mouth daily        XANAX PO      Take 0.25 mg by mouth 3 times daily as  needed

## 2018-04-12 NOTE — PROGRESS NOTES
Re:  Christina Cotto- 1950    Dear Dr. Caldwell,    I had the pleasure of seeing Christina today in follow-up for her sigmoid colectomy with colostomy creation on 3/31/18 in the context of complicated diverticulitis with perforation and peritonitis. The patient tolerated the procedure well and had an uneventful post op recovery. She is generally feeling well today without any specific concerns.    On exam, the patient's incision is healing well without signs of infection. I removed the staples and placed Steri strips. There is a small gap at the inferior aspect of the incision which should granulate in nicely.    Christina is doing very well postoperatively. We will be happy to see her in the future as needed. She will return in August or September for preoperative planning in regards to her colostomy reversal surgery.      Sincerely,           Rodrigo Newman MD      Please route or send letter to:  Primary Care Provider (PCP)  Ike Caldwell

## 2018-04-12 NOTE — LETTER
April 12, 2018    Re:  Christina Cotto- 1950     Dear Dr. Caldwell,     I had the pleasure of seeing Christina today in follow-up for her sigmoid colectomy with colostomy creation on 3/31/18 in the context of complicated diverticulitis with perforation and peritonitis. The patient tolerated the procedure well and had an uneventful post op recovery. She is generally feeling well today without any specific concerns.     On exam, the patient's incision is healing well without signs of infection. I removed the staples and placed Steri strips. There is a small gap at the inferior aspect of the incision which should granulate in nicely.     Christina is doing very well postoperatively. We will be happy to see her in the future as needed. She will return in August or September for preoperative planning in regards to her colostomy reversal surgery.       Sincerely,   Rodrigo Newman MD

## 2018-06-04 ENCOUNTER — TELEPHONE (OUTPATIENT)
Dept: SURGERY | Facility: CLINIC | Age: 68
End: 2018-06-04

## 2018-06-04 ENCOUNTER — OFFICE VISIT (OUTPATIENT)
Dept: SURGERY | Facility: CLINIC | Age: 68
End: 2018-06-04
Payer: MEDICARE

## 2018-06-04 ENCOUNTER — TELEPHONE (OUTPATIENT)
Dept: WOUND CARE | Facility: CLINIC | Age: 68
End: 2018-06-04

## 2018-06-04 VITALS
SYSTOLIC BLOOD PRESSURE: 122 MMHG | DIASTOLIC BLOOD PRESSURE: 76 MMHG | BODY MASS INDEX: 40.97 KG/M2 | OXYGEN SATURATION: 97 % | HEART RATE: 68 BPM | WEIGHT: 240 LBS | HEIGHT: 64 IN | RESPIRATION RATE: 16 BRPM

## 2018-06-04 DIAGNOSIS — Z09 SURGERY FOLLOW-UP: Primary | ICD-10-CM

## 2018-06-04 DIAGNOSIS — R10.32 LEFT LOWER QUADRANT PAIN: ICD-10-CM

## 2018-06-04 DIAGNOSIS — R10.32 GROIN PAIN, LEFT: Primary | ICD-10-CM

## 2018-06-04 PROCEDURE — 99024 POSTOP FOLLOW-UP VISIT: CPT | Performed by: PHYSICIAN ASSISTANT

## 2018-06-04 NOTE — TELEPHONE ENCOUNTER
Pt called asking for help with ostomy products. Pt has developed a small prolapse and Surgeon suspects possible hernia. Pt was seen in MD office this am and plan for CT scan in am.   Because pt is now having an extended stay in the area, she is requesting samples for Lubricating deodorant. A free sample was provided the the pt's spouse who also relayed a skin fold issue. Chin is stating the the skin the obese pannus is red, moist and MD suspects fungal rash due to recent heat . Small piece of AquaCel Ag was provided to spouse along with directions for use and care of the product.   Spouse is aware and grateful to [facility] service.

## 2018-06-04 NOTE — MR AVS SNAPSHOT
After Visit Summary   6/4/2018    Christina Cotto    MRN: [id number]           Patient Information     Date Of Birth          1950        Visit Information        Provider Department      6/4/2018 10:30 AM Billie Duran PA-C [facility] Hernia      Today's Diagnoses     Surgery follow-up    -  1      Care Instructions    CT ABDOMEN AND PELVIS WITH CONTRAST with valsalva     Date: 6/5/2018   Time: 2:30 pm   Location: [address]  [address]      Please check in at 2:00 pm       Preparation for CT scanning      Do not eat or drink anything TWO hours prior to your exam.    Please bring an updated list of all your medications, including IV Chemo Therapy over the counter and herbal supplements.    For questions regarding your test please call [phone].   If you are taking any medications and you have questions, please call your primary care physician.             Follow-ups after your visit        Your next 10 appointments already scheduled     Jun 05, 2018  2:30 PM CDT   CT ABDOMEN PELVIS W CONTRAST with [id number]   [facility] ([facility])    [id number] [place] [id number] [phone]           Please bring any scans or X-rays taken at other hospitals, if similar tests were done. Also bring a list of your medicines, including vitamins, minerals and over-the-counter drugs. It is safest to leave personal items at home.  Be sure to tell your doctor:   If you have any allergies.   If there s any chance you are pregnant.   If you are breastfeeding.  How to prepare:   Do not eat or drink for 2 hours before your exam. If you need to take medicine, you may take it with small sips of water. (We may ask you to take liquid medicine as well.)   Please wear loose clothing, such as a sweat suit or jogging clothes.  Avoid snaps, zippers and other metal. We may ask you to undress and put on a hospital gown.  Please arrive 30 minutes early for your CT. Once in the department you might be asked to drink water 15-20 minutes prior to your exam.  If indicated you may be asked to drink an oral contrast in advance of your CT.  If this is the case, the imaging team will let you know or be in contact with you prior to your appointment  Patients over 70 or patients with diabetes or kidney problems:   If you haven t had a blood test (creatinine test) within the last 30 days, the Cardiologist/Radiologist may require you to get this test prior to your exam.  If you have diabetes:   Continue to take your metformin medication on the day of your exam  If you have any questions, please call the Imaging Department where you will have your exam.              Future tests that were ordered for you today     Open Future Orders        Priority Expected Expires Ordered    CT Abdomen Pelvis w Contrast Routine 6/4/2018 6/4/2019 6/4/2018            Who to contact     If you have questions or need follow up information about today's clinic visit or your schedule please contact [facility] directly at [phone].  Normal or non-critical lab and imaging results will be communicated to you by GoYoDeohart, letter or phone within 4 business days after the clinic has received the results. If you do not hear from us within 7 days, please contact the clinic through GoYoDeohart or phone. If you have a critical or abnormal lab result, we will notify you by phone as soon as possible.  Submit refill requests through Individual Digital or call your pharmacy and they will forward the refill request to us. Please allow 3 business days for your refill to be completed.          Additional Information About Your Visit        Individual Digital Information     Individual Digital gives you secure access to your electronic health record. If you see a primary care provider, you can also send  "messages to your care team and make appointments. If you have questions, please call your primary care clinic.  If you do not have a primary care provider, please call [phone] and they will assist you.        Care EveryWhere ID     This is your Care EveryWhere ID. This could be used by other organizations to access your [place] medical records  [id number]        Your Vitals Were     Pulse Respirations Height Pulse Oximetry BMI (Body Mass Index)       68 16 5' 4\" (1.626 m) 97% 41.2 kg/m2        Blood Pressure from Last 3 Encounters:   06/04/18 122/76   04/12/18 120/70   04/06/18 140/57    Weight from Last 3 Encounters:   06/04/18 240 lb (108.9 kg)   04/12/18 240 lb (108.9 kg)   03/31/18 240 lb 1.6 oz (108.9 kg)              Today, you had the following     No orders found for display         Today's Medication Changes          These changes are accurate as of 6/4/18 10:53 AM.  If you have any questions, ask your nurse or doctor.               Stop taking these medicines if you haven't already. Please contact your care team if you have questions.     ondansetron 4 MG ODT tab   Commonly known as:  ZOFRAN ODT   Stopped by:  Billie Duran PA-C           oxyCODONE HCl 5 MG Taba   Commonly known as:  OXAYDO   Stopped by:  Billie Duran PA-C                    Primary Care Provider Office Phone # Fax #    Ike Caldwell [phone]       [address] [address]        Equal Access to Services     [facility]: Hadii aad ku hadasho Soomaali, waaxda luqadaha, qaybta kaalmada zenaidaegyada, janice rosario . So [facility] [phone].    ATENCIÓN: Si habla español, tiene a mariscal disposición servicios gratuitos de asistencia lingüística. Llame al [phone].    We comply with applicable federal civil rights laws and [place] laws. We do not discriminate on the basis of race, color, national origin, age, disability, sex, sexual " orientation, or gender identity.            Thank you!     Thank you for choosing [facility]  for your care. Our goal is always to provide you with excellent care. Hearing back from our patients is one way we can continue to improve our services. Please take a few minutes to complete the written survey that you may receive in the mail after your visit with us. Thank you!             Your Updated Medication List - Protect others around you: Learn how to safely use, store and throw away your medicines at www.disposemymeds.org.          This list is accurate as of 6/4/18 10:53 AM.  Always use your most recent med list.                   Brand Name Dispense Instructions for use Diagnosis    ALDACTONE PO      Take 50 mg by mouth daily        ascorbic acid 500 MG Tabs      Take 1,000 mg by mouth daily        aspirin 81 MG chewable tablet      Take 81 mg by mouth daily        buPROPion 300 MG 24 hr tablet    WELLBUTRIN XL     Take by mouth every morning        Calcium carb-Vitamin D 500 mg [place]-200 units 500-200 MG-UNIT per tablet    OSCAL with D;Oyster Shell Calcium     Take 1 tablet by mouth daily        CYCLOBENZAPRINE HCL PO      Take 5 mg by mouth daily as needed        LEVOTHYROXINE SODIUM PO      Take 100 mcg by mouth daily        multivitamin, therapeutic Tabs tablet      Take 1 tablet by mouth daily        polyethylene glycol 0.4%- propylene glycol 0.3% 0.4-0.3 % Soln ophthalmic solution    SYSTANE ULTRA     Place 1 drop into both eyes 4 times daily as needed for dry eyes        PRILOSEC PO      Take 20 mg by mouth every morning        propranolol 80 MG 24 hr capsule    INDERAL LA     Take 80 mg by mouth daily For tremor        REFRESH P.M. Oint      Apply to eye At Bedtime        SYSTANE BALANCE 0.6 % Soln ophthalmic solution   Generic drug:  propylene glycol      Place 1 drop into both eyes 4 times daily as needed for dry eyes        VIIBRYD PO      Take 40 mg by mouth daily        XANAX PO       Take 0.25 mg by mouth 3 times daily as needed

## 2018-06-04 NOTE — PROGRESS NOTES
6/4/2018      Subjective:  Christina Cotto is here with complaints of increasing LLQ pain that radiates to her groin. She underwent a sigmoid colectomy with colostomy by Dr. Newman on 3/31/2018 for complicated diverticulitis with perforation and peritonitis. The pathology confirms diverticulosis with acute diverticulitis with abscess and microscopic perforation and was negative for malignancy. Today she  tells me over the past 3 weeks she has noticed increased LLQ pain that extends into her left groin, primarily aggravated with walking and change in position. She reports this pain takes her breath away at times and is alleviated with rest. She reports her stoma is functioning well and putting out stool and flatus. She currently does not need any pain medications, she is eating a normal diet and her bowels are regular. Of note, she does report she has a history of left hip pain and she has had a right total hip.    Objective:  Abd - soft, tender to palpation at incision and surrounding stoma extending into LLQ, obese, +BS  Inc - well healed, no erythema, +normal healing ridge, no masses.   Stoma pink, protruding with +stool.    *Seen with Dr. Newman    Assessment:  S/p sigmoid colectomy with colostomy by Dr. Newman on 3/31/2018 for complicated diverticulitis with perforation and peritonitis.    Plan:  Abdominal pelvic CT  Plan to return to discuss reversal at the end of August    Billie Duran PA-C    Please route or send letter to:  Primary Care Provider (PCP)

## 2018-06-04 NOTE — TELEPHONE ENCOUNTER
Name of caller: Patient    Reason for Call:  Pt has a large lump top of stoma and pain.    Surgeon:  Dr. Newman    Recent Surgery:  Yes.    If yes, when & what type:   Exploratory laparotomy with sigmoid colectomy, end colostomy creation and peritoneal washout 3/31/18       Best phone number to reach pt at is: [phone]   Ok to leave a message with medical info? Yes.    Pharmacy preferred (if calling for a refill): N/A    Addendum:  Patient reporting- pain began one week ago and is slowly worsening.  Patient scheduled to see clinic PA today at 10:30, Dr. Newman notified.  Connie Kniney, RN on 6/4/2018 at 8:47 AM

## 2018-06-04 NOTE — PATIENT INSTRUCTIONS
CT ABDOMEN AND PELVIS WITH CONTRAST with valsalva     Date: 6/5/2018   Time: 2:30 pm   Location: [facility]  [address]  [address]  [place]  [address]      Please check in at 2:00 pm       Preparation for CT scanning      Do not eat or drink anything TWO hours prior to your exam.    Please bring an updated list of all your medications, including IV Chemo Therapy over the counter and herbal supplements.    For questions regarding your test please call [phone].   If you are taking any medications and you have questions, please call your primary care physician.

## 2018-06-05 ENCOUNTER — HOSPITAL ENCOUNTER (OUTPATIENT)
Dept: CT IMAGING | Facility: CLINIC | Age: 68
Discharge: HOME | End: 2018-06-05
Attending: SURGERY | Admitting: SURGERY
Payer: MEDICARE

## 2018-06-05 DIAGNOSIS — R10.32 GROIN PAIN, LEFT: ICD-10-CM

## 2018-06-05 DIAGNOSIS — R10.32 LEFT LOWER QUADRANT PAIN: ICD-10-CM

## 2018-06-05 LAB
CREAT BLD-MCNC: 0.9 MG/DL (ref 0.52–1.04)
GFR SERPL CREATININE-BSD FRML MDRD: 62 ML/MIN/1.7M2

## 2018-06-05 PROCEDURE — 74177 CT ABD & PELVIS W/CONTRAST: CPT

## 2018-06-05 PROCEDURE — 82565 ASSAY OF CREATININE: CPT

## 2018-06-05 PROCEDURE — 25000128 H RX IP 250 OP 636: Performed by: SURGERY

## 2018-06-05 RX ORDER — IOPAMIDOL 755 MG/ML
500 INJECTION, SOLUTION INTRAVASCULAR ONCE
Status: COMPLETED | OUTPATIENT
Start: 2018-06-05 | End: 2018-06-05

## 2018-06-05 RX ADMIN — IOPAMIDOL 100 ML: 755 INJECTION, SOLUTION INTRAVENOUS at 14:44

## 2018-06-05 RX ADMIN — SODIUM CHLORIDE 55 ML: 9 INJECTION, SOLUTION INTRAVENOUS at 14:44

## 2018-06-07 NOTE — PROGRESS NOTES
Patient was phoned by me with result.  Results discussed, no hernia or other abnormality that would explain the pain she describes in the LLQ.

## 2018-08-06 ENCOUNTER — TELEPHONE (OUTPATIENT)
Dept: SURGERY | Facility: CLINIC | Age: 68
End: 2018-08-06

## 2018-08-06 NOTE — TELEPHONE ENCOUNTER
GENERAL SURGERY NURSE PHONE TRIAGE   Christina Cotto    MRN# [id number]  AGE:  68 year old  YOB: 1950  [phone] (home)  Surgeon: Dr. Newman     Surgery type: LAPAROTOMY EXPLORATORY, SIGMOID COLECTOMY, COLOSTOMY , Peritoneal Washings  COMBINED COLECTOMY WITH COLOSTOMY     Surgery Date: March / 31 / 2018     Last seen in clinic: 6/4/18     CHIEF CONCERN:    1. Pain  2.rectal bleeding     HISTORY OF PRESENT ILLNESS:   Abdominal pain- onset 3 weeks ago.  Comes and goes.   Rectal bleeding- onset 2 weeks ago.  Pink, only noted when wiping.   Patient was scheduled with Dr Newman on 8/27/18 to discuss colostomy takedown.    PLAN:   Move 8/27/18 appointment to 8/9/18.    If pain becomes severe and / or patient's rectal bleeding increases- she should call or go to ED.  Do not start Ibuprofen until PCP approves.  (patient lives in [facility])  Pt is in agreement with this plan.  Connie Kinney RN on 8/6/2018 at 5:11 PM

## 2018-08-09 ENCOUNTER — TELEPHONE (OUTPATIENT)
Dept: SURGERY | Facility: CLINIC | Age: 68
End: 2018-08-09

## 2018-08-09 ENCOUNTER — OFFICE VISIT (OUTPATIENT)
Dept: SURGERY | Facility: CLINIC | Age: 68
End: 2018-08-09
Payer: MEDICARE

## 2018-08-09 VITALS
BODY MASS INDEX: 40.97 KG/M2 | OXYGEN SATURATION: 95 % | SYSTOLIC BLOOD PRESSURE: 110 MMHG | RESPIRATION RATE: 16 BRPM | WEIGHT: 240 LBS | DIASTOLIC BLOOD PRESSURE: 64 MMHG | HEIGHT: 64 IN | HEART RATE: 68 BPM

## 2018-08-09 DIAGNOSIS — E66.01 MORBID OBESITY (H): ICD-10-CM

## 2018-08-09 DIAGNOSIS — Z93.3 COLOSTOMY STATUS (H): Primary | ICD-10-CM

## 2018-08-09 PROCEDURE — 99213 OFFICE O/P EST LOW 20 MIN: CPT | Performed by: SURGERY

## 2018-08-09 NOTE — LETTER
Name:     GONZALO COTTO   MRN:      [id number]        Account:      [id number]   :      1950           Service Date: 2018     Service Date: 2018       Ms. Cotto and her daughter returned to my office today to discuss the timing and coordination of her colostomy reversal.  She is known to me from an emergency colectomy and colostomy at the end of March for complicated diverticulitis with perforation.  She states that recently she has been having some pain around the colostomy site with activity and has a tugging and pulling sensation associated with this.  The colostomy is otherwise functioning normally.  She does continue to have some extrusion of material from her rectum, sometimes this is mildly bloody.  She has had a colonoscopy within the last year or two.         Examination today does reveal a healthy-appearing and functional colostomy in the left lower quadrant. There is a mild amount of herniation associated with this, especially when sitting up from a supine position.  There is no evidence of midline incisional hernia.       I think it is an appropriate time interval to consider her colostomy reversal, and we will start planning this today.  I have asked that she undergo a barium enema through both the colostomy and the rectum for operative planning.  We will look for a date for surgery.  Risks of the operation and anticipated convalescence were reviewed.  More than 50% of this ten-minute clinic visit was spent in discussion of surgery, operative planning and coordination of care in general.       ELIE OMER MD

## 2018-08-09 NOTE — PROGRESS NOTES
Service Date: 2018      Ms. Cotto and her daughter returned to my office today to discuss the timing and coordination of her colostomy reversal.  She is known to me from an emergency colectomy and colostomy at the end of March for complicated diverticulitis with perforation.  She states that recently she has been having some pain around the colostomy site with activity and has a tugging and pulling sensation associated with this.  The colostomy is otherwise functioning normally.  She does continue to have some extrusion of material from her rectum, sometimes this is mildly bloody.  She has had a colonoscopy within the last year or two.        Examination today does reveal a healthy-appearing and functional colostomy in the left lower quadrant.  There is a mild amount of herniation associated with this, especially when sitting up from a supine position.  There is no evidence of midline incisional hernia.      I think it is an appropriate time interval to consider her colostomy reversal, and we will start planning this today.  I have asked that she undergo a barium enema through both the colostomy and the rectum for operative planning.  We will look for a date for surgery.  Risks of the operation and anticipated convalescence were reviewed.  More than 50% of this ten-minute clinic visit was spent in discussion of surgery, operative planning and coordination of care in general.         ELIE OMER MD             D: 2018   T: 2018   MT: DEWEY      Name:     GONZALO COTTO   MRN:      [id number]        Account:      [id number]   :      1950           Service Date: 2018      Document: [id number]

## 2018-08-09 NOTE — TELEPHONE ENCOUNTER
Type of surgery: LAPAROSCOPIC HAND ASSISTED COLOSTOMY REVERSAL.   Location of surgery: [facility]  Date and time of surgery: 8/22/2018 @ 7:30 AM   Surgeon: EILE SAMUELS MD    Pre-Op Appt Date: PATIENT TO SCHEDULE    Post-Op Appt Date: PATIENT TO SCHEDULE     Packet sent out: PACKET AND SOAP GIVEN TO PATIENT   Pre-cert/Authorization completed:  Not Applicable  Date: 8/9/2018     LAPAROSCOPIC HAND ASSISTED COLOSTOMY REVERSAL.   GENERAL PT INST TO HAVE H&P WITH DR SANCHEZ 360 MIN REQ PA ASSIST RMO NMS DR ROMO TO DO BILATERAL URETERAL STENT PLACEMENT NMS

## 2018-08-09 NOTE — PROGRESS NOTES
Please insert dictated note here.    Please route or send letter to:  Primary Care Provider (PCP)

## 2018-08-09 NOTE — MR AVS SNAPSHOT
After Visit Summary   8/9/2018    Christina Cotto    MRN: [id number]           Patient Information     Date Of Birth          1950        Visit Information        Provider Department      8/9/2018 10:30 AM Rodrigo Claire MD [facility] General Surgery      Today's Diagnoses     Colostomy status (H)    -  1    Morbid obesity (H)           Follow-ups after your visit        Your next 10 appointments already scheduled     Aug 10, 2018 10:00 AM CDT   XR COLON BARIUM with [id number], [facility] ([facility])    [address]  [facility] [address] [phone]           Your exam will take about one hour. If you think you might be pregnant, tell your doctor before your exam.  Your bowel (insides) must be empty to get a clear picture. Follow these guidelines:   The night before your exam:Take 10 ounces magnesium citrate at 6 p.m. the night before your exam.   Take 3 Dulcolax tablets at 8 p.m. Swallow the tablets whole, do not chew or crush them. Do not take within 1 hour of antacids.   1 Dulcolax rectal suppository. Take this the morning of your exam if your stools are not clear by this time. If your stools are clear, you don t need to take this. Do not use a suppository if you are having an air contrast colon.   Follow a  non-residue diet  for at least 48 hours. This means no fruits, vegetables, nuts, seeds or whole grains.   Do not eat, chew gum or smoke for 8 hours before your exam.   Keep drinking clear liquids until 2 hours before your exam. Clear liquids include water, clear juice, black coffee or clear tea without milk, Gatorade, clear soda.   Please bring a list of your current medicines to your exam. (Include vitamins, minerals and over-the-counter medicines.) Leave your valuables at home.  Please call the Imaging Department at your exam site with any questions.     "        Aug 22, 2018  7:30 AM CDT   [facility] Main OR with Rodrigo Claire MD   [facility]    [facility])   [phone]              Future tests that were ordered for you today     Open Future Orders        Priority Expected Expires Ordered    XR Colon Barium Routine 8/9/2018 8/9/2019 8/9/2018            Who to contact     If you have questions or need follow up information about today's clinic visit or your schedule please contact [facility] directly at [phone].  Normal or non-critical lab and imaging results will be communicated to you by [employer], letter or phone within 4 business days after the clinic has received the results. If you do not hear from us within 7 days, please contact the clinic through [employer] or phone. If you have a critical or abnormal lab result, we will notify you by phone as soon as possible.  Submit refill requests through [employer] or call your pharmacy and they will forward the refill request to us. Please allow 3 business days for your refill to be completed.          Additional Information About Your Visit        MyChart Information     [employer] gives you secure access to your electronic health record. If you see a primary care provider, you can also send messages to your care team and make appointments. If you have questions, please call your primary care clinic.  If you do not have a primary care provider, please call [phone] and they will assist you.        Care EveryWhere ID     This is your Care EveryWhere ID. This could be used by other organizations to access your [place] medical records  [id number]        Your Vitals Were     Pulse Respirations Height Pulse Oximetry Breastfeeding? BMI (Body Mass Index)    68 16 5' 4\" (1.626 m) 95% No 41.2 kg/m2       Blood Pressure from Last 3 Encounters:   08/09/18 110/64   06/04/18 122/76 "   04/12/18 120/70    Weight from Last 3 Encounters:   08/09/18 240 lb (108.9 kg)   06/04/18 240 lb (108.9 kg)   04/12/18 240 lb (108.9 kg)              Today, you had the following     No orders found for display       Primary Care Provider Office Phone # Fax #    Ike Caldwell [phone]       [place] [address] [facility] [address]        Equal Access to Services     MEHUL CARIAS : Hadii aad ku hadasho Soomaali, waaxda luqadaha, qaybta kaalmada adeegyada, waxay idiin hayaan adehilario rosario . So [facility] [phone].    ATENCIÓN: Si habla español, tiene a mariscal disposición servicios gratuitos de asistencia lingüística. Llame al [phone].    We comply with applicable federal civil rights laws and [place] laws. We do not discriminate on the basis of race, color, national origin, age, disability, sex, sexual orientation, or gender identity.            Thank you!     Thank you for choosing [facility]  for your care. Our goal is always to provide you with excellent care. Hearing back from our patients is one way we can continue to improve our services. Please take a few minutes to complete the written survey that you may receive in the mail after your visit with us. Thank you!             Your Updated Medication List - Protect others around you: Learn how to safely use, store and throw away your medicines at www.disposemymeds.org.          This list is accurate as of 8/9/18 10:40 AM.  Always use your most recent med list.                   Brand Name Dispense Instructions for use Diagnosis    ALDACTONE PO      Take 50 mg by mouth daily        ascorbic acid 500 MG Tabs      Take 1,000 mg by mouth daily        aspirin 81 MG chewable tablet      Take 81 mg by mouth daily        buPROPion 300 MG 24 hr tablet    WELLBUTRIN XL     Take by mouth every morning        Calcium carb-Vitamin D 500 mg Nikolski-200 units 500-200 MG-UNIT per tablet    OSCAL with D;Oyster Shell  Calcium     Take 1 tablet by mouth daily        CYCLOBENZAPRINE HCL PO      Take 5 mg by mouth daily as needed        LEVOTHYROXINE SODIUM PO      Take 100 mcg by mouth daily        multivitamin, therapeutic Tabs tablet      Take 1 tablet by mouth daily        polyethylene glycol 0.4%- propylene glycol 0.3% 0.4-0.3 % Soln ophthalmic solution    SYSTANE ULTRA     Place 1 drop into both eyes 4 times daily as needed for dry eyes        PRILOSEC PO      Take 20 mg by mouth every morning        propranolol 80 MG 24 hr capsule    INDERAL LA     Take 80 mg by mouth daily For tremor        REFRESH P.M. Oint      Apply to eye At Bedtime        SYSTANE BALANCE 0.6 % Soln ophthalmic solution   Generic drug:  propylene glycol      Place 1 drop into both eyes 4 times daily as needed for dry eyes        VIIBRYD PO      Take 40 mg by mouth daily        XANAX PO      Take 0.25 mg by mouth 3 times daily as needed

## 2018-08-10 ENCOUNTER — HOSPITAL ENCOUNTER (OUTPATIENT)
Dept: GENERAL RADIOLOGY | Facility: CLINIC | Age: 68
Discharge: HOME | End: 2018-08-10
Attending: SURGERY | Admitting: SURGERY
Payer: MEDICARE

## 2018-08-10 DIAGNOSIS — Z93.3 COLOSTOMY STATUS (H): ICD-10-CM

## 2018-08-10 PROCEDURE — 74270 X-RAY XM COLON 1CNTRST STD: CPT

## 2018-08-19 NOTE — PHARMACY-ADMISSION MEDICATION HISTORY
Admission medication history interview status for this patient is complete. See [facility] admission navigator for allergy information, prior to admission medications and immunization status.     Prior to Admission medications    Medication Sig Last Dose Taking? Auth Provider   ALPRAZolam (XANAX PO) Take 0.25 mg by mouth 3 times daily   Yes Reported, Patient   Artificial Tear Ointment (REFRESH P.M.) OINT Apply to eye At Bedtime  Yes Unknown, Entered By History   ascorbic acid 500 MG TABS Take 1,000 mg by mouth daily  Yes Unknown, Entered By History   aspirin 81 MG chewable tablet Take 81 mg by mouth daily 8/9/2018 Yes Reported, Patient   buPROPion (WELLBUTRIN XL) 300 MG 24 hr tablet Take by mouth every morning  Yes Unknown, Entered By History   Calcium carb-Vitamin D 500 mg Eyak-200 units (OSCAL WITH D;OYSTER SHELL CALCIUM) 500-200 MG-UNIT per tablet Take 1 tablet by mouth daily  Yes Unknown, Entered By History   CYCLOBENZAPRINE HCL PO Take 5 mg by mouth daily as needed   Yes Reported, Patient   LEVOTHYROXINE SODIUM PO Take 100 mcg by mouth daily   Yes Reported, Patient   multivitamin, therapeutic (THERA-VIT) TABS tablet Take 1 tablet by mouth daily  Yes Unknown, Entered By History   Omeprazole (PRILOSEC PO) Take 20 mg by mouth every morning   Yes Reported, Patient   polyethylene glycol 0.4%- propylene glycol 0.3% (SYSTANE ULTRA) 0.4-0.3 % SOLN ophthalmic solution Place 1 drop into both eyes 4 times daily as needed for dry eyes  Yes Unknown, Entered By History   propranolol (INDERAL LA) 80 MG 24 hr capsule Take 80 mg by mouth daily For tremor  Yes Unknown, Entered By History   propylene glycol (SYSTANE BALANCE) 0.6 % SOLN ophthalmic solution Place 1 drop into both eyes 4 times daily as needed for dry eyes  Yes Unknown, Entered By History   Spironolactone (ALDACTONE PO) Take 50 mg by mouth daily   Yes Reported, Patient   Vilazodone HCl (VIIBRYD PO) Take 20 mg by mouth daily   Yes Reported, Patient

## 2018-08-21 ENCOUNTER — HOSPITAL ENCOUNTER (OUTPATIENT)
Dept: LAB | Facility: CLINIC | Age: 68
Discharge: HOME | DRG: 345 | End: 2018-08-21
Attending: SURGERY | Admitting: UROLOGY
Payer: MEDICARE

## 2018-08-21 DIAGNOSIS — Z01.812 PRE-OPERATIVE LABORATORY EXAMINATION: ICD-10-CM

## 2018-08-21 LAB
ABO + RH BLD: NORMAL
ABO + RH BLD: NORMAL
BLD GP AB SCN SERPL QL: NORMAL
BLOOD BANK CMNT PATIENT-IMP: NORMAL
SPECIMEN EXP DATE BLD: NORMAL

## 2018-08-21 PROCEDURE — 86900 BLOOD TYPING SEROLOGIC ABO: CPT | Performed by: UROLOGY

## 2018-08-21 PROCEDURE — 86901 BLOOD TYPING SEROLOGIC RH(D): CPT | Performed by: UROLOGY

## 2018-08-21 PROCEDURE — 36415 COLL VENOUS BLD VENIPUNCTURE: CPT | Performed by: UROLOGY

## 2018-08-21 PROCEDURE — 86850 RBC ANTIBODY SCREEN: CPT | Performed by: UROLOGY

## 2018-08-22 ENCOUNTER — HOSPITAL ENCOUNTER (INPATIENT)
Facility: CLINIC | Age: 68
LOS: 8 days | Discharge: HOME | DRG: 345 | End: 2018-08-30
Attending: SURGERY | Admitting: SURGERY
Payer: MEDICARE

## 2018-08-22 ENCOUNTER — APPOINTMENT (OUTPATIENT)
Dept: SURGERY | Facility: PHYSICIAN GROUP | Age: 68
End: 2018-08-22
Payer: MEDICARE

## 2018-08-22 ENCOUNTER — ANESTHESIA (OUTPATIENT)
Dept: SURGERY | Facility: CLINIC | Age: 68
DRG: 345 | End: 2018-08-22
Payer: MEDICARE

## 2018-08-22 ENCOUNTER — ANESTHESIA EVENT (OUTPATIENT)
Dept: SURGERY | Facility: CLINIC | Age: 68
DRG: 345 | End: 2018-08-22
Payer: MEDICARE

## 2018-08-22 PROBLEM — Z93.3 COLOSTOMY STATUS (H): Status: ACTIVE | Noted: 2018-08-22

## 2018-08-22 LAB
GLUCOSE BLDC GLUCOMTR-MCNC: 159 MG/DL (ref 70–99)
PLATELET # BLD AUTO: 284 10E9/L (ref 150–450)

## 2018-08-22 PROCEDURE — 71000012 ZZH RECOVERY PHASE 1 LEVEL 1 FIRST HR: Performed by: SURGERY

## 2018-08-22 PROCEDURE — 36000063 ZZH SURGERY LEVEL 4 EA 15 ADDTL MIN: Performed by: SURGERY

## 2018-08-22 PROCEDURE — 00000146 ZZHCL STATISTIC GLUCOSE BY METER IP

## 2018-08-22 PROCEDURE — 25000566 ZZH SEVOFLURANE, EA 15 MIN: Performed by: SURGERY

## 2018-08-22 PROCEDURE — 25000128 H RX IP 250 OP 636: Performed by: SURGERY

## 2018-08-22 PROCEDURE — 44227 LAP CLOSE ENTEROSTOMY: CPT | Performed by: SURGERY

## 2018-08-22 PROCEDURE — 52005 CYSTO W/URTRL CATHJ: CPT | Performed by: UROLOGY

## 2018-08-22 PROCEDURE — 25000125 ZZHC RX 250: Performed by: SURGERY

## 2018-08-22 PROCEDURE — 44227 LAP CLOSE ENTEROSTOMY: CPT | Mod: AS | Performed by: PHYSICIAN ASSISTANT

## 2018-08-22 PROCEDURE — 25000125 ZZHC RX 250: Performed by: NURSE ANESTHETIST, CERTIFIED REGISTERED

## 2018-08-22 PROCEDURE — 25000128 H RX IP 250 OP 636: Performed by: ANESTHESIOLOGY

## 2018-08-22 PROCEDURE — 25000132 ZZH RX MED GY IP 250 OP 250 PS 637: Mod: GY | Performed by: SURGERY

## 2018-08-22 PROCEDURE — 37000008 ZZH ANESTHESIA TECHNICAL FEE, 1ST 30 MIN: Performed by: SURGERY

## 2018-08-22 PROCEDURE — 88305 TISSUE EXAM BY PATHOLOGIST: CPT | Mod: 26 | Performed by: SURGERY

## 2018-08-22 PROCEDURE — 25000128 H RX IP 250 OP 636: Performed by: NURSE ANESTHETIST, CERTIFIED REGISTERED

## 2018-08-22 PROCEDURE — A9270 NON-COVERED ITEM OR SERVICE: HCPCS | Mod: GY | Performed by: SURGERY

## 2018-08-22 PROCEDURE — 85049 AUTOMATED PLATELET COUNT: CPT | Performed by: SURGERY

## 2018-08-22 PROCEDURE — 12000007 ZZH R&B INTERMEDIATE

## 2018-08-22 PROCEDURE — 25800025 ZZH RX 258: Performed by: SURGERY

## 2018-08-22 PROCEDURE — 25800025 ZZH RX 258: Performed by: UROLOGY

## 2018-08-22 PROCEDURE — 88305 TISSUE EXAM BY PATHOLOGIST: CPT | Performed by: SURGERY

## 2018-08-22 PROCEDURE — 37000009 ZZH ANESTHESIA TECHNICAL FEE, EACH ADDTL 15 MIN: Performed by: SURGERY

## 2018-08-22 PROCEDURE — 25000125 ZZHC RX 250: Performed by: ANESTHESIOLOGY

## 2018-08-22 PROCEDURE — 88304 TISSUE EXAM BY PATHOLOGIST: CPT | Performed by: SURGERY

## 2018-08-22 PROCEDURE — 88304 TISSUE EXAM BY PATHOLOGIST: CPT | Mod: 26 | Performed by: SURGERY

## 2018-08-22 PROCEDURE — 40000306 ZZH STATISTIC PRE PROC ASSESS II: Performed by: SURGERY

## 2018-08-22 PROCEDURE — 27210794 ZZH OR GENERAL SUPPLY STERILE: Performed by: SURGERY

## 2018-08-22 PROCEDURE — 36000093 ZZH SURGERY LEVEL 4 1ST 30 MIN: Performed by: SURGERY

## 2018-08-22 PROCEDURE — 36415 COLL VENOUS BLD VENIPUNCTURE: CPT | Performed by: SURGERY

## 2018-08-22 RX ORDER — BUPIVACAINE HYDROCHLORIDE 5 MG/ML
INJECTION, SOLUTION EPIDURAL; INTRACAUDAL PRN
Status: DISCONTINUED | OUTPATIENT
Start: 2018-08-22 | End: 2018-08-22 | Stop reason: HOSPADM

## 2018-08-22 RX ORDER — LIDOCAINE HYDROCHLORIDE 10 MG/ML
INJECTION, SOLUTION INFILTRATION; PERINEURAL PRN
Status: DISCONTINUED | OUTPATIENT
Start: 2018-08-22 | End: 2018-08-22

## 2018-08-22 RX ORDER — PROPOFOL 10 MG/ML
INJECTION, EMULSION INTRAVENOUS CONTINUOUS PRN
Status: DISCONTINUED | OUTPATIENT
Start: 2018-08-22 | End: 2018-08-22

## 2018-08-22 RX ORDER — NEOSTIGMINE METHYLSULFATE 1 MG/ML
VIAL (ML) INJECTION PRN
Status: DISCONTINUED | OUTPATIENT
Start: 2018-08-22 | End: 2018-08-22

## 2018-08-22 RX ORDER — HEPARIN SODIUM 5000 [USP'U]/.5ML
5000 INJECTION, SOLUTION INTRAVENOUS; SUBCUTANEOUS EVERY 8 HOURS
Status: DISCONTINUED | OUTPATIENT
Start: 2018-08-23 | End: 2018-08-30 | Stop reason: HOSPADM

## 2018-08-22 RX ORDER — LEVOTHYROXINE SODIUM 100 UG/1
100 TABLET ORAL DAILY
Status: DISCONTINUED | OUTPATIENT
Start: 2018-08-22 | End: 2018-08-30 | Stop reason: HOSPADM

## 2018-08-22 RX ORDER — DEXAMETHASONE SODIUM PHOSPHATE 4 MG/ML
INJECTION, SOLUTION INTRA-ARTICULAR; INTRALESIONAL; INTRAMUSCULAR; INTRAVENOUS; SOFT TISSUE PRN
Status: DISCONTINUED | OUTPATIENT
Start: 2018-08-22 | End: 2018-08-22

## 2018-08-22 RX ORDER — PROPRANOLOL HYDROCHLORIDE 80 MG/1
80 CAPSULE, EXTENDED RELEASE ORAL DAILY
Status: DISCONTINUED | OUTPATIENT
Start: 2018-08-23 | End: 2018-08-30 | Stop reason: HOSPADM

## 2018-08-22 RX ORDER — ONDANSETRON 4 MG/1
4 TABLET, ORALLY DISINTEGRATING ORAL EVERY 30 MIN PRN
Status: DISCONTINUED | OUTPATIENT
Start: 2018-08-22 | End: 2018-08-22 | Stop reason: HOSPADM

## 2018-08-22 RX ORDER — SODIUM CHLORIDE, SODIUM LACTATE, POTASSIUM CHLORIDE, CALCIUM CHLORIDE 600; 310; 30; 20 MG/100ML; MG/100ML; MG/100ML; MG/100ML
INJECTION, SOLUTION INTRAVENOUS CONTINUOUS
Status: DISCONTINUED | OUTPATIENT
Start: 2018-08-22 | End: 2018-08-22 | Stop reason: HOSPADM

## 2018-08-22 RX ORDER — BUPROPION HYDROCHLORIDE 150 MG/1
300 TABLET ORAL EVERY MORNING
Status: DISCONTINUED | OUTPATIENT
Start: 2018-08-23 | End: 2018-08-30 | Stop reason: HOSPADM

## 2018-08-22 RX ORDER — ONDANSETRON 2 MG/ML
4 INJECTION INTRAMUSCULAR; INTRAVENOUS EVERY 6 HOURS PRN
Status: DISCONTINUED | OUTPATIENT
Start: 2018-08-22 | End: 2018-08-30 | Stop reason: HOSPADM

## 2018-08-22 RX ORDER — NALOXONE HYDROCHLORIDE 0.4 MG/ML
.1-.4 INJECTION, SOLUTION INTRAMUSCULAR; INTRAVENOUS; SUBCUTANEOUS
Status: DISCONTINUED | OUTPATIENT
Start: 2018-08-22 | End: 2018-08-30 | Stop reason: HOSPADM

## 2018-08-22 RX ORDER — LIDOCAINE 40 MG/G
CREAM TOPICAL
Status: DISCONTINUED | OUTPATIENT
Start: 2018-08-22 | End: 2018-08-22 | Stop reason: HOSPADM

## 2018-08-22 RX ORDER — HYDROMORPHONE HYDROCHLORIDE 1 MG/ML
.3-.5 INJECTION, SOLUTION INTRAMUSCULAR; INTRAVENOUS; SUBCUTANEOUS EVERY 5 MIN PRN
Status: DISCONTINUED | OUTPATIENT
Start: 2018-08-22 | End: 2018-08-22 | Stop reason: HOSPADM

## 2018-08-22 RX ORDER — ASPIRIN 81 MG/1
81 TABLET, CHEWABLE ORAL DAILY
Status: DISCONTINUED | OUTPATIENT
Start: 2018-08-23 | End: 2018-08-30 | Stop reason: HOSPADM

## 2018-08-22 RX ORDER — DIPHENHYDRAMINE HCL 12.5MG/5ML
12.5 LIQUID (ML) ORAL EVERY 6 HOURS PRN
Status: DISCONTINUED | OUTPATIENT
Start: 2018-08-22 | End: 2018-08-30 | Stop reason: HOSPADM

## 2018-08-22 RX ORDER — EPHEDRINE SULFATE 50 MG/ML
INJECTION, SOLUTION INTRAMUSCULAR; INTRAVENOUS; SUBCUTANEOUS PRN
Status: DISCONTINUED | OUTPATIENT
Start: 2018-08-22 | End: 2018-08-22

## 2018-08-22 RX ORDER — ACETAMINOPHEN 325 MG/1
975 TABLET ORAL EVERY 8 HOURS
Status: COMPLETED | OUTPATIENT
Start: 2018-08-22 | End: 2018-08-25

## 2018-08-22 RX ORDER — SCOLOPAMINE TRANSDERMAL SYSTEM 1 MG/1
1 PATCH, EXTENDED RELEASE TRANSDERMAL ONCE
Status: COMPLETED | OUTPATIENT
Start: 2018-08-22 | End: 2018-08-22

## 2018-08-22 RX ORDER — OXYCODONE HYDROCHLORIDE 5 MG/1
5-10 TABLET ORAL EVERY 4 HOURS PRN
Status: DISCONTINUED | OUTPATIENT
Start: 2018-08-22 | End: 2018-08-30 | Stop reason: HOSPADM

## 2018-08-22 RX ORDER — DIPHENHYDRAMINE HYDROCHLORIDE 50 MG/ML
12.5 INJECTION INTRAMUSCULAR; INTRAVENOUS EVERY 6 HOURS PRN
Status: DISCONTINUED | OUTPATIENT
Start: 2018-08-22 | End: 2018-08-30 | Stop reason: HOSPADM

## 2018-08-22 RX ORDER — DOCUSATE SODIUM 100 MG/1
100 CAPSULE, LIQUID FILLED ORAL 2 TIMES DAILY
Status: DISCONTINUED | OUTPATIENT
Start: 2018-08-22 | End: 2018-08-28

## 2018-08-22 RX ORDER — KETOROLAC TROMETHAMINE 30 MG/ML
INJECTION, SOLUTION INTRAMUSCULAR; INTRAVENOUS PRN
Status: DISCONTINUED | OUTPATIENT
Start: 2018-08-22 | End: 2018-08-22

## 2018-08-22 RX ORDER — ACETAMINOPHEN 325 MG/1
650 TABLET ORAL EVERY 4 HOURS PRN
Status: DISCONTINUED | OUTPATIENT
Start: 2018-08-25 | End: 2018-08-30 | Stop reason: HOSPADM

## 2018-08-22 RX ORDER — LIDOCAINE 40 MG/G
CREAM TOPICAL
Status: DISCONTINUED | OUTPATIENT
Start: 2018-08-22 | End: 2018-08-30 | Stop reason: HOSPADM

## 2018-08-22 RX ORDER — PROPOFOL 10 MG/ML
INJECTION, EMULSION INTRAVENOUS PRN
Status: DISCONTINUED | OUTPATIENT
Start: 2018-08-22 | End: 2018-08-22

## 2018-08-22 RX ORDER — LABETALOL HYDROCHLORIDE 5 MG/ML
10 INJECTION, SOLUTION INTRAVENOUS
Status: DISCONTINUED | OUTPATIENT
Start: 2018-08-22 | End: 2018-08-22 | Stop reason: HOSPADM

## 2018-08-22 RX ORDER — NALOXONE HYDROCHLORIDE 0.4 MG/ML
.1-.4 INJECTION, SOLUTION INTRAMUSCULAR; INTRAVENOUS; SUBCUTANEOUS
Status: ACTIVE | OUTPATIENT
Start: 2018-08-22 | End: 2018-08-23

## 2018-08-22 RX ORDER — ERTAPENEM 1 G/1
1 INJECTION, POWDER, LYOPHILIZED, FOR SOLUTION INTRAMUSCULAR; INTRAVENOUS ONCE
Status: COMPLETED | OUTPATIENT
Start: 2018-08-22 | End: 2018-08-22

## 2018-08-22 RX ORDER — VILAZODONE HYDROCHLORIDE 20 MG/1
20 TABLET ORAL DAILY
Status: DISCONTINUED | OUTPATIENT
Start: 2018-08-23 | End: 2018-08-30 | Stop reason: HOSPADM

## 2018-08-22 RX ORDER — FENTANYL CITRATE 50 UG/ML
25-50 INJECTION, SOLUTION INTRAMUSCULAR; INTRAVENOUS
Status: DISCONTINUED | OUTPATIENT
Start: 2018-08-22 | End: 2018-08-22 | Stop reason: HOSPADM

## 2018-08-22 RX ORDER — MEPERIDINE HYDROCHLORIDE 50 MG/ML
12.5 INJECTION INTRAMUSCULAR; INTRAVENOUS; SUBCUTANEOUS EVERY 5 MIN PRN
Status: DISCONTINUED | OUTPATIENT
Start: 2018-08-22 | End: 2018-08-22 | Stop reason: HOSPADM

## 2018-08-22 RX ORDER — ONDANSETRON 2 MG/ML
INJECTION INTRAMUSCULAR; INTRAVENOUS PRN
Status: DISCONTINUED | OUTPATIENT
Start: 2018-08-22 | End: 2018-08-22

## 2018-08-22 RX ORDER — ONDANSETRON 2 MG/ML
4 INJECTION INTRAMUSCULAR; INTRAVENOUS EVERY 30 MIN PRN
Status: DISCONTINUED | OUTPATIENT
Start: 2018-08-22 | End: 2018-08-22 | Stop reason: HOSPADM

## 2018-08-22 RX ORDER — ALPRAZOLAM 0.25 MG
0.25 TABLET ORAL 3 TIMES DAILY
Status: DISCONTINUED | OUTPATIENT
Start: 2018-08-22 | End: 2018-08-30 | Stop reason: HOSPADM

## 2018-08-22 RX ORDER — ONDANSETRON 4 MG/1
4 TABLET, ORALLY DISINTEGRATING ORAL EVERY 6 HOURS PRN
Status: DISCONTINUED | OUTPATIENT
Start: 2018-08-22 | End: 2018-08-30 | Stop reason: HOSPADM

## 2018-08-22 RX ORDER — GLYCOPYRROLATE 0.2 MG/ML
INJECTION, SOLUTION INTRAMUSCULAR; INTRAVENOUS PRN
Status: DISCONTINUED | OUTPATIENT
Start: 2018-08-22 | End: 2018-08-22

## 2018-08-22 RX ORDER — SODIUM CHLORIDE, SODIUM LACTATE, POTASSIUM CHLORIDE, CALCIUM CHLORIDE 600; 310; 30; 20 MG/100ML; MG/100ML; MG/100ML; MG/100ML
INJECTION, SOLUTION INTRAVENOUS CONTINUOUS
Status: DISCONTINUED | OUTPATIENT
Start: 2018-08-22 | End: 2018-08-30 | Stop reason: HOSPADM

## 2018-08-22 RX ADMIN — WHITE PETROLATUM: .15; .85 OINTMENT OPHTHALMIC at 21:24

## 2018-08-22 RX ADMIN — SODIUM CHLORIDE, POTASSIUM CHLORIDE, SODIUM LACTATE AND CALCIUM CHLORIDE: 600; 310; 30; 20 INJECTION, SOLUTION INTRAVENOUS at 17:10

## 2018-08-22 RX ADMIN — PHENYLEPHRINE HYDROCHLORIDE 100 MCG: 10 INJECTION, SOLUTION INTRAMUSCULAR; INTRAVENOUS; SUBCUTANEOUS at 08:04

## 2018-08-22 RX ADMIN — MIDAZOLAM 2 MG: 1 INJECTION INTRAMUSCULAR; INTRAVENOUS at 07:32

## 2018-08-22 RX ADMIN — Medication: at 12:41

## 2018-08-22 RX ADMIN — PHENYLEPHRINE HYDROCHLORIDE 100 MCG: 10 INJECTION, SOLUTION INTRAMUSCULAR; INTRAVENOUS; SUBCUTANEOUS at 07:43

## 2018-08-22 RX ADMIN — GLYCOPYRROLATE 0.4 MG: 0.2 INJECTION, SOLUTION INTRAMUSCULAR; INTRAVENOUS at 12:10

## 2018-08-22 RX ADMIN — LIDOCAINE HYDROCHLORIDE 50 MG: 10 INJECTION, SOLUTION INFILTRATION; PERINEURAL at 07:37

## 2018-08-22 RX ADMIN — HYDROMORPHONE HYDROCHLORIDE 1 MG: 1 INJECTION, SOLUTION INTRAMUSCULAR; INTRAVENOUS; SUBCUTANEOUS at 07:39

## 2018-08-22 RX ADMIN — PHENYLEPHRINE HYDROCHLORIDE 100 MCG: 10 INJECTION, SOLUTION INTRAMUSCULAR; INTRAVENOUS; SUBCUTANEOUS at 07:52

## 2018-08-22 RX ADMIN — ROCURONIUM BROMIDE 10 MG: 10 INJECTION INTRAVENOUS at 09:53

## 2018-08-22 RX ADMIN — ERTAPENEM SODIUM 1 G: 1 INJECTION, POWDER, LYOPHILIZED, FOR SOLUTION INTRAMUSCULAR; INTRAVENOUS at 07:32

## 2018-08-22 RX ADMIN — PHENYLEPHRINE HYDROCHLORIDE 100 MCG: 10 INJECTION, SOLUTION INTRAMUSCULAR; INTRAVENOUS; SUBCUTANEOUS at 10:25

## 2018-08-22 RX ADMIN — PHENYLEPHRINE HYDROCHLORIDE 100 MCG: 10 INJECTION, SOLUTION INTRAMUSCULAR; INTRAVENOUS; SUBCUTANEOUS at 08:12

## 2018-08-22 RX ADMIN — PROPOFOL 50 MCG/KG/MIN: 10 INJECTION, EMULSION INTRAVENOUS at 07:45

## 2018-08-22 RX ADMIN — SODIUM CHLORIDE, POTASSIUM CHLORIDE, SODIUM LACTATE AND CALCIUM CHLORIDE: 600; 310; 30; 20 INJECTION, SOLUTION INTRAVENOUS at 08:41

## 2018-08-22 RX ADMIN — ONDANSETRON 4 MG: 2 INJECTION INTRAMUSCULAR; INTRAVENOUS at 11:38

## 2018-08-22 RX ADMIN — HYDROMORPHONE HYDROCHLORIDE 0.5 MG: 1 INJECTION, SOLUTION INTRAMUSCULAR; INTRAVENOUS; SUBCUTANEOUS at 11:37

## 2018-08-22 RX ADMIN — ACETAMINOPHEN 975 MG: 325 TABLET, FILM COATED ORAL at 16:17

## 2018-08-22 RX ADMIN — PHENYLEPHRINE HYDROCHLORIDE 100 MCG: 10 INJECTION, SOLUTION INTRAMUSCULAR; INTRAVENOUS; SUBCUTANEOUS at 08:22

## 2018-08-22 RX ADMIN — PHENYLEPHRINE HYDROCHLORIDE 100 MCG: 10 INJECTION, SOLUTION INTRAMUSCULAR; INTRAVENOUS; SUBCUTANEOUS at 08:16

## 2018-08-22 RX ADMIN — DEXAMETHASONE SODIUM PHOSPHATE 8 MG: 4 INJECTION, SOLUTION INTRA-ARTICULAR; INTRALESIONAL; INTRAMUSCULAR; INTRAVENOUS; SOFT TISSUE at 07:37

## 2018-08-22 RX ADMIN — Medication 3 MG: at 12:10

## 2018-08-22 RX ADMIN — ONDANSETRON 4 MG: 2 INJECTION INTRAMUSCULAR; INTRAVENOUS at 07:37

## 2018-08-22 RX ADMIN — GLYCOPYRROLATE 0.2 MG: 0.2 INJECTION, SOLUTION INTRAMUSCULAR; INTRAVENOUS at 07:37

## 2018-08-22 RX ADMIN — ACETAMINOPHEN 975 MG: 325 TABLET, FILM COATED ORAL at 22:21

## 2018-08-22 RX ADMIN — HYDROMORPHONE HYDROCHLORIDE 0.5 MG: 1 INJECTION, SOLUTION INTRAMUSCULAR; INTRAVENOUS; SUBCUTANEOUS at 11:35

## 2018-08-22 RX ADMIN — ROCURONIUM BROMIDE 50 MG: 10 INJECTION INTRAVENOUS at 07:37

## 2018-08-22 RX ADMIN — ROCURONIUM BROMIDE 10 MG: 10 INJECTION INTRAVENOUS at 09:20

## 2018-08-22 RX ADMIN — LEVOTHYROXINE SODIUM 100 MCG: 100 TABLET ORAL at 16:18

## 2018-08-22 RX ADMIN — DOCUSATE SODIUM 100 MG: 100 CAPSULE, LIQUID FILLED ORAL at 21:25

## 2018-08-22 RX ADMIN — ROCURONIUM BROMIDE 20 MG: 10 INJECTION INTRAVENOUS at 08:32

## 2018-08-22 RX ADMIN — ROCURONIUM BROMIDE 10 MG: 10 INJECTION INTRAVENOUS at 10:17

## 2018-08-22 RX ADMIN — SCOPALAMINE 1 PATCH: 1 PATCH, EXTENDED RELEASE TRANSDERMAL at 07:06

## 2018-08-22 RX ADMIN — ALPRAZOLAM 0.25 MG: 0.25 TABLET ORAL at 16:19

## 2018-08-22 RX ADMIN — ALPRAZOLAM 0.25 MG: 0.25 TABLET ORAL at 21:25

## 2018-08-22 RX ADMIN — PHENYLEPHRINE HYDROCHLORIDE 100 MCG: 10 INJECTION, SOLUTION INTRAMUSCULAR; INTRAVENOUS; SUBCUTANEOUS at 10:05

## 2018-08-22 RX ADMIN — PROPOFOL 200 MG: 10 INJECTION, EMULSION INTRAVENOUS at 07:37

## 2018-08-22 RX ADMIN — Medication 10 MG: at 10:42

## 2018-08-22 RX ADMIN — SODIUM CHLORIDE, POTASSIUM CHLORIDE, SODIUM LACTATE AND CALCIUM CHLORIDE: 600; 310; 30; 20 INJECTION, SOLUTION INTRAVENOUS at 07:32

## 2018-08-22 RX ADMIN — PHENYLEPHRINE HYDROCHLORIDE 100 MCG: 10 INJECTION, SOLUTION INTRAMUSCULAR; INTRAVENOUS; SUBCUTANEOUS at 08:30

## 2018-08-22 RX ADMIN — Medication 1 LOZENGE: at 17:40

## 2018-08-22 RX ADMIN — SODIUM CHLORIDE, POTASSIUM CHLORIDE, SODIUM LACTATE AND CALCIUM CHLORIDE: 600; 310; 30; 20 INJECTION, SOLUTION INTRAVENOUS at 11:15

## 2018-08-22 RX ADMIN — KETOROLAC TROMETHAMINE 30 MG: 30 INJECTION, SOLUTION INTRAMUSCULAR at 07:37

## 2018-08-22 ASSESSMENT — ACTIVITIES OF DAILY LIVING (ADL)
ADLS_ACUITY_SCORE: 12
ADLS_ACUITY_SCORE: 12

## 2018-08-22 NOTE — IP AVS SNAPSHOT
[place] [address] [facility]    [address]    [facility] [address]    Phone:  [phone]    Fax:  [phone]                                       After Visit Summary   8/22/2018    Christina Cotto    MRN: [id number]           After Visit Summary Signature Page     I have received my discharge instructions, and my questions have been answered. I have discussed any challenges I see with this plan with the nurse or doctor.    ..........................................................................................................................................  Patient/Patient Representative Signature      ..........................................................................................................................................  Patient Representative Print Name and Relationship to Patient    ..................................................               ................................................  Date                                            Time    ..........................................................................................................................................  Reviewed by Signature/Title    ...................................................              ..............................................  Date                                                            Time          22EPIC Rev 08/18

## 2018-08-22 NOTE — OP NOTE
Procedure Date: 08/22/2018      DATE OF SURGERY:  08/22/2018      PREOPERATIVE DIAGNOSIS:  Complicated diverticulitis, status post colectomy and colostomy.      POSTOPERATIVE DIAGNOSIS:  Complicated diverticulitis, status post colectomy and colostomy.      PROCEDURE:  Laparoscopic hand-assisted colostomy reversal.      ANESTHESIA:  General plus local.      SURGEON:  Rodrigo Newman MD.        ASSISTANT:  Katerin Bedoya PA-C.  The physician assistant was medically necessary for her skills in suturing, cutting of suture, exposure and traction, counter traction as well as firing of the EEA stapler throughout the operation.      SPECIMENS:   1.  Rectal stump for routine pathologic handling.   2.  Colostomy for routine pathologic handling.      COMPLICATIONS:  None.      INDICATIONS:  Ms. Cotto is a 68-year-old female known to me from an episode of complicated diverticulitis with pneumoperitoneum at the end of March of this year who returned to our office earlier this month to plan the timing of her colostomy reversal.  She wished to do this in a more expedited fashion given that she was having some pain and what seemed to be a parastomal hernia.  After undergoing a barium enema and having an adequate bowel prep, the patient was scheduled to have surgery today.  First, the operation including infection, bleeding, harm to adjacent structures, anastomotic leak, need for repeat colostomy creation as well as the possibility of a repeat diverticulitis, hernia formation and scar tissue formation were reviewed.  The patient verbalized understanding of the above and consented to proceed.      FINDINGS:  There were scant filmy adhesions from the previous laparotomy evident.  We created.  There was a slight parastomal hernia present as well.  We created a 25 mm EEA stapled anastomosis with intact donuts and a negative leak test.      DESCRIPTION OF PROCEDURE:  With the patient under excellent general anesthesia in low lithotomy  position, the colostomy was closed and the abdomen and perineum were subsequently prepped and draped out in the usual sterile surgical fashion.  This was all done immediately after the patient had undergone placement of bilateral ureteral stents.  Timeout was performed confirming the patient, procedure to be done as well as drug allergies.  She did receive a dose of Invanz for infectious prophylaxis prior to making our incision.      We began by utilizing approximately 6 cm of the superior aspect of her midline incision, incising it sharply with a #15 blade.  We used electrocautery dissection to get through the subcutaneous fat to the midline fascia.  The fascia was incised exposing the preperitoneal fat.  The abdomen was entered bluntly, we encountered the omentum just deep to the incision, which was loosely adhered to the peritoneum.  This was swept away with my fingertip without difficulty and the fascia was subsequently opened the entire length of our incision with electrocautery.  We grasped the fascial edges and took down some of the adhesions adjacent to the midline with blunt dissection and electrocautery.  This allowed me to place my hand into the wound and placed 2 further 5 mm ports in the right lower quadrant with my hand as a back stop.  The hand port was placed and insufflation was applied.  The patient was placed in slight Trendelenburg position.  We turned our attention initially to the pelvis and took down the two loops of small bowel which were adhered to the anterior midline.  This allowed us to sweep the entire small bowel viscera away with the exception of one loop of bowel which was adhesed to the top of the old staple line of the Madison's pouch.  This was taken down with sharp dissection without difficulty and subsequently inspected and was found to be intact.  The Madison's pouch was sharply and bluntly dissected free from the pelvis, we mobilized the top of the rectum bluntly and sharply  as well.  I then performed a rigid proctoscopy and was able to drive the scope most of the way up, but found it quite tortuous.  Some further adhesions were taken down on the surface of the rectum to straighten it.  This allowed us to place the sizer up to the end of the rectal stump.  We elected to trim the top of the Madison stump to have a fresh staple line, this was done by fenestrating the mesentery adjacent to where the tinea splayed at the top of the true rectum.  A green load of LORETA staples was then fired across this, taking 2 loads to divide the bowel.  We used the LigaSure to divide the adjacent mesentery to free the specimen and subsequently retrieved this and submitted as rectal stump.  The pelvis was then copiously irrigated.  We turned our attention to the colostomy and sharply cut the mucocutaneous junction circumferentially with a #15 blade.  Electrocautery and sharp dissection was then used to mobilize the entirety of the colostomy down to the level of the fascia.  As there was a slight peristomal hernia, this was made easier by the fact that it was a thin sac adhered to the subq and colon itself.  The colon, once entirely freed, was reduced into the abdomen.  We grasped the posterior aspect of the rectus, sheath mobilized this from the underside of the rectus and closed it with a single 0 PDS in a running fashion.  Zero PDS stitches x2 were then used to close the anterior rectus sheath over the rectus muscle.  This site was then irrigated and packed.  We turned our attention back to laparoscopy and were able to drive the sizer up to the end of the rectal stump and pulled the descending colon down after mobilizing the colon by incising the white line of Toldt down to the pelvis.  We picked a point where the colon was less affected by some of the scar tissue involved in the colostomy creation and fenestrated the mesentery adjacent to this, this was found to pull nicely down to the pelvis with  minimal tension.  We pulled the descending colon through the hand port and trimmed the distal aspect of the colon free from the site where the mesentery window was created and cut the adjacent mesenteric attachments as well.  A 25 mm EEA stapler was brought into the field and the sharp spike was placed on the anvil.  The anvil was then placed in the descending colon which was subsequently closed atop of this with a LORETA stapler (blue load).  The spike was then drawn through the staple line and the spike was removed from the anvil itself.  A single pursestring was then placed around this to assure security.  This was then dropped back into the abdomen, the sizer and subsequently handle from the stapler were then carefully brought through the rectum until it reached the end.  The rectum was spiked somewhat anteriorly over the staple line and the descending colon and anvil were attached to this.  It was drawn shut and closed to a mild amount of pressure on the stapler handle.  We waited about 30 seconds for compression, subsequently fired the stapler without difficulty.  This was then opened 2 turns as per instruction and gently withdrawn through the anal orifice.  The donuts were inspected and found to be intact on both sides.  The pelvis was then irrigated and a rigid proctoscopy was performed with the anastomosis under the irrigant, there were no bubbles noted and the insufflation did leak around the end of the scope.  This irrigant was then suctioned dry.  A #15 round Ok drain was introduced into the field and placed in the dependent portion of the pelvis through our 5 mm port.  The port was removed and the drain stitched to the skin with a 2-0 nylon.  We removed the remaining port as well as the hand port site.  The midline fascia was then closed with 0 PDS stitches x2.  Thirty milliliters of 0.5% Marcaine distributed in all the incisions.  The subq again was then irrigated and closed loosely with interrupted  4-0 at the midline, a pursestring suture was used of 3-0 Vicryl at the old stoma site.  All these gaps were then packed with Telfa.  Otherwise,  Steri-Strips and dry sterile dressings were applied atop the wounds.  The patient tolerated the procedure well.  The ureteral catheters removed.  She was extubated and brought to recovery in excellent condition.  All sharp, sponge and instrument counts were correct at the conclusion of the case.         ELIE OMER MD             D: 2018   T: 2018   MT: JAQUI      Name:     GONZALO AM   MRN:      [id number]        Account:        [id number]   :      1950           Procedure Date: 2018      Document: [id number]       cc: Ike Caldwell MD

## 2018-08-22 NOTE — IP AVS SNAPSHOT
MRN:[id number]                      After Visit Summary   8/22/2018    Christina Cotto    MRN: [id number]           Thank you!     Thank you for choosing [facility] for your care. Our goal is always to provide you with excellent care. Hearing back from our patients is one way we can continue to improve our services. Please take a few minutes to complete the written survey that you may receive in the mail after you visit. If you would like to speak to someone directly about your visit please contact Patient Relations at [phone]. Thank you!          Patient Information     Date Of Birth          1950        Designated Caregiver       Most Recent Value    Caregiver    Will someone help with your care after discharge? no      About your hospital stay     You were admitted on:  August 22, 2018 You last received care in the:  [place] [address] [facility]    You were discharged on:  August 30, 2018       Who to Call     For medical emergencies, please call 911.  For non-urgent questions about your medical care, please call your primary care provider or clinic, [phone]  For questions related to your surgery, please call your surgery clinic        Attending Provider     Provider Specialty    Rodrigo Claire MD General Surgery       Primary Care Provider Office Phone # Fax #    Ike Caldwell [phone]      Further instructions from your care team       HOME CARE FOLLOWING ABDOMINAL SURGERY  GEOFF Jolly, KARLA Shin R. O Donnell    INCISIONAL CARE:  Replace the bandage over your incision (or incisions) until all drainage stops, or if more comfortable to have in place.  If present, leave the steri-strips (white paper tapes) in place for 14 days after surgery.  If you have staples in your incision at the time of discharge, they will be removed at your follow-up appointment.  If Dermabond (a type of skin glue) is present,  leave in place until it wears/flakes off.     BATHING:  Avoid baths for 1 week after surgery.  Showers are okay.  You may wash your hair at any time.  Gently pat your incision dry after bathing.    ACTIVITY:  Light Activity -- you may immediately be up and about as tolerated.  Driving -- you may drive when comfortable and off narcotic pain medications.  Light Work -- resume when comfortable off pain medications.  (If you can drive, you probably can work.)  Strenuous Work/Activity -- limit lifting to 20 pounds for 4 weeks.  Then, progressively increase with time.  Active Sports (running, biking, etc.) -- cautiously resume after 6 weeks.    DISCOMFORT:  Use pain medications as prescribed by your surgeon.  Take the pain medication with some food, when possible, to minimize side effects.  Expect gradual improvement.    DIET:  Return to diet you were on before surgery, unless you are given specific diet instructions.  Drink plenty of fluids.  While taking pain medications, increase dietary fiber or add a fiber supplementation like Metamucil or Citrucel to help prevent constipation - a possible side effect of pain medications.    NAUSEA:  If nauseated from the anesthetic/pain meds; rest in bed, get up cautiously with assistance, and drink clear liquids (juice, tea, broth).    RETURN APPOINTMENT:  Schedule a follow-up visit 2-3 weeks after discharge from the hospital.  Office Phone:  [phone]     CONTACT US IF THE FOLLOWING DEVELOPS:   1. A fever that is above 101     2. If there is a large amount of drainage, bleeding, or swelling.   3. Severe pain that is not relieved by your prescription.   4. Drainage that is thick, cloudy, yellow, green or white.   5. Any other questions not answered by  Frequently Asked Questions  sheet.      FREQUENTLY ASKED QUESTIONS:    Q:  How should my incision look?    A:  Normally your incision will appear slightly swollen with light redness directly along the incision itself as it heals.   It may feel like a bump or ridge as the healing/scarring happens, and over time (3-4 months) this bump or ridge feeling should slowly go away.  In general, clear or pink watery drainage can be normal at first as your incision heals, but should decrease over time.    Q:  How do I know if my incision is infected?  A:  Look at your incision for signs of infection, like redness around the incision spreading to surrounding skin, or drainage of cloudy or foul-smelling drainage.  If you feel warm, check your temperature to see if you are running a fever.    **If any of these things occur, please notify the nurse at our office.  We may need you to come into the office for an incision check.      Q:  How do I take care of my incision?  A:  If you have a dressing in place - Starting the day after surgery, replace the dressing 1-2 times a day until there is no further drainage from the incision.  At that time, a dressing is no longer needed.  Try to minimize tape on the skin if irritation is occurring at the tape sites.  If you have significant irritation from tape on the skin, please call the office to discuss other method of dressing your incision.    Small pieces of tape called  steri-strips  may be present directly overlying your incision; these may be removed 10 days after surgery unless otherwise specified by your surgeon.  If these tapes start to loosen at the ends, you may trim them back until they fall off or are removed.    A:  If you had  Dermabond  tissue glue used as a dressing (this causes your incision to look shiny with a clear covering over it) - This type of dressing wears off with time and does not require more dressings over the top unless it is draining around the glue as it wears off.  Do not apply ointments or lotions over the incisions until the glue has completely worn off.    Q:  There is a piece of tape or a sticky  lead  still on my skin.  Can I remove this?  A:  Sometimes the sticky  leads  used  for monitoring during surgery or for evaluation in the emergency department are not all removed while you are in the hospital.  These sometimes have a tab or metal dot on them.  You can easily remove these on your own, like taking off a band-aid.  If there is a gel substance under the  lead , simply wipe/clean it off with a washcloth or paper towel.      Q:  What can I do to minimize constipation (very hard stools, or lack of stools)?  A:  Stay well hydrated.  Increase your dietary fiber intake or take a fiber supplement -with plenty of water.  Walk around frequently.  You may consider an over-the-counter stool-softener.  Your Pharmacist can assist you with choosing one that is stocked at your pharmacy.  Constipation is also one of the most common side effects of pain medication.  If you are using pain medication, be pro-active and try to PREVENT problems with constipation by taking the steps above BEFORE constipation becomes a problem.    Q:  What do I do if I need more pain medications?  A:  Call the office to receive refills.  Be aware that certain pain meds cannot be called into a pharmacy and actually require a paper prescription.  A change may be made in your pain med as you progress thru your recovery period or if you have side effects to certain meds.    --Pain meds are NOT refilled after 5pm on weekdays, and NOT AT ALL on the weekends, so please look ahead to prevent problems.      Q:  Why am I having a hard time sleeping now that I am at home?  A:  Many medications you receive while you are in the hospital can impact your sleep for a number of days after your surgery/hospitalization.  Decreased level of activity and naps during the day may also make sleeping at night difficult.  Try to minimize day-time naps, and get up frequently during the day to walk around your home during your recovery time.  Sleep aides may be of some help, but are not recommended for long-term use.      Q:  I am having some back  discomfort.  What should I do?  A:  This may be related to certain positioning that was required for your surgery, extended periods of time in bed, or other changes in your overall activity level.  You may try ice, heat, acetaminophen, or ibuprofen to treat this temporarily.  Note that many pain medications have acetaminophen in them and would state this on the prescription bottle.  Be sure not to exceed the maximum of 4000mg per day of acetaminophen.     **If the pain you are having does not resolve, is severe, or is a flare of back pain you have had on other occasions prior to surgery, please contact your primary physician for further recommendations or for an appointment to be examined at their office.    Q:  Why am I having headaches?  A:  Headaches can be caused by many things:  caffeine withdrawal, use of pain meds, dehydration, high blood pressure, lack of sleep, over-activity/exhaustion, flare-up of usual migraine headaches.  If you feel this is related to muscle tension (a band-like feeling around the head, or a pressure at the low-back of the head) you may try ice or heat to this area.  You may need to drink more fluids (try electrolyte drink like Gatorade), rest, or take your usual migraine medications.   **If your headaches do not resolve, worsen, are accompanied by other symptoms, or if your blood pressure is high, please call your primary physician for recommendation and/or examination.    Q:  I am unable to urinate.  What do I do?  A:  A small percentage of people can have difficulty urinating initially after surgery.  This includes being able to urinate only a very small amount at a time and feeling discomfort or pressure in the very low abdomen.  This is called  urinary retention , and is actually an urgent situation.  Proceed to your nearest Emergency department for evaluation (not an Urgent Care Center).  Sometimes the bladder does not work correctly after certain medications you receive during  "surgery, or related to certain procedures.  You may need to have a catheter placed until your bladder recovers.  When planning to go to an Emergency department, it may help to call the ER to let them know you are coming in for this problem after a surgery.  This may help you get in quicker to be evaluated.  **If you have symptoms of a urinary tract infection, please contact your primary physician for the proper evaluation and treatment.          If you have other questions, please call the office Monday thru Friday between 8am and 5pm to discuss with the nurse or physician assistant.  #[phone]    There is a surgeon ON CALL on weekday evenings and over the weekend in case of urgent need only, and may be contacted at the same number.    If you are having an emergency, call 911 or proceed to your nearest emergency department.        Pending Results     No orders found from 8/20/2018 to 8/23/2018.            Statement of Approval     Ordered          08/30/18 0343  I have reviewed and agree with all the recommendations and orders detailed in this document.  EFFECTIVE NOW     Approved and electronically signed by:  Rodrigo Claire MD             Admission Information     Date & Time Provider Department Dept. Phone    8/22/2018 Rodrigo Claire MD [place] [address] [facility] [phone]      Your Vitals Were     Blood Pressure Pulse Temperature Respirations Height Weight    123/37 (BP Location: Left arm) 56 98.2  F (36.8  C) (Oral) 18 1.626 m (5' 4\") 112.9 kg (249 lb)    Pulse Oximetry BMI (Body Mass Index)                95% 42.74 kg/m2          MyChart Information     [employer] gives you secure access to your electronic health record. If you see a primary care provider, you can also send messages to your care team and make appointments. If you have questions, please call your primary care clinic.  If you do not have a primary care provider, please call [phone] and they will assist you.   "      Care EveryWhere ID     This is your Care EveryWhere ID. This could be used by other organizations to access your [place] medical records  [id number]        Equal Access to Services     MEHUL CARIAS : Jose Angel Fletcher, quentin gutierrez, saijovan faithalexandereliana estevezrashard, waxandrés conner etelvinashima estevezhilario sanchez marlene espinal. So [facility] [phone].    ATENCIÓN: Si habla español, tiene a mariscal disposición servicios gratuitos de asistencia lingüística. Llame al [phone].    We comply with applicable federal civil rights laws and [place] laws. We do not discriminate on the basis of race, color, national origin, age, disability, sex, sexual orientation, or gender identity.               Review of your medicines      CONTINUE these medicines which have NOT CHANGED        Dose / Directions    ALDACTONE PO        Dose:  50 mg   Take 50 mg by mouth daily   Refills:  0       ascorbic acid 500 MG Tabs        Dose:  1000 mg   Take 1,000 mg by mouth daily   Refills:  0       aspirin 81 MG chewable tablet        Dose:  81 mg   Take 81 mg by mouth daily   Refills:  0       buPROPion 300 MG 24 hr tablet   Commonly known as:  WELLBUTRIN XL        Take by mouth every morning   Refills:  0       calcium carbonate 500 mg-vitamin D 200 units 500-200 MG-UNIT per tablet   Commonly known as:  OSCAL with D;OYSTER SHELL CALCIUM        Dose:  1 tablet   Take 1 tablet by mouth daily   Refills:  0       CYCLOBENZAPRINE HCL PO        Dose:  5 mg   Take 5 mg by mouth daily as needed   Refills:  0       LEVOTHYROXINE SODIUM PO        Dose:  100 mcg   Take 100 mcg by mouth daily   Refills:  0       multivitamin, therapeutic Tabs tablet        Dose:  1 tablet   Take 1 tablet by mouth daily   Refills:  0       polyethylene glycol 0.4%- propylene glycol 0.3% 0.4-0.3 % Soln ophthalmic solution   Commonly known as:  SYSTANE ULTRA        Dose:  1 drop   Place 1 drop into both eyes 4 times daily as needed for dry eyes   Refills:  0       PRILOSEC PO         Dose:  20 mg   Take 20 mg by mouth every morning   Refills:  0       propranolol 80 MG 24 hr capsule   Commonly known as:  INDERAL LA        Dose:  80 mg   Take 80 mg by mouth daily For tremor   Refills:  0       REFRESH P.M. Oint        Apply to eye At Bedtime   Refills:  0       SYSTANE BALANCE 0.6 % Soln ophthalmic solution   Generic drug:  propylene glycol        Dose:  1 drop   Place 1 drop into both eyes 4 times daily as needed for dry eyes   Refills:  0       VIIBRYD PO        Dose:  20 mg   Take 20 mg by mouth daily   Refills:  0       XANAX PO        Dose:  0.25 mg   Take 0.25 mg by mouth 3 times daily   Refills:  0                Protect others around you: Learn how to safely use, store and throw away your medicines at www.disposemymeds.org.             Medication List: This is a list of all your medications and when to take them. Check marks below indicate your daily home schedule. Keep this list as a reference.      Medications           Morning Afternoon Evening Bedtime As Needed    ALDACTONE PO   Take 50 mg by mouth daily   Next Dose Due:  None taken today.  Restart as directed                                ascorbic acid 500 MG Tabs   Take 1,000 mg by mouth daily   Next Dose Due:  None taken today.  Restart as directed                                aspirin 81 MG chewable tablet   Take 81 mg by mouth daily   Last time this was given:  81 mg on 8/30/2018  8:40 AM   Next Dose Due:  Due again tomorrow morning                                buPROPion 300 MG 24 hr tablet   Commonly known as:  WELLBUTRIN XL   Take by mouth every morning   Last time this was given:  300 mg on 8/30/2018  8:39 AM   Next Dose Due:  Due again tomorrow morning                                calcium carbonate 500 mg-vitamin D 200 units 500-200 MG-UNIT per tablet   Commonly known as:  OSCAL with D;OYSTER SHELL CALCIUM   Take 1 tablet by mouth daily   Next Dose Due:  None taken today.  Restart as directed.                                 CYCLOBENZAPRINE HCL PO   Take 5 mg by mouth daily as needed   Next Dose Due:  None taken today.  Available as needed                                LEVOTHYROXINE SODIUM PO   Take 100 mcg by mouth daily   Last time this was given:  100 mcg on 8/30/2018  8:40 AM   Next Dose Due:  Due again tomorrow morning                                multivitamin, therapeutic Tabs tablet   Take 1 tablet by mouth daily   Next Dose Due:  None taken today.  Restart as directed                                polyethylene glycol 0.4%- propylene glycol 0.3% 0.4-0.3 % Soln ophthalmic solution   Commonly known as:  SYSTANE ULTRA   Place 1 drop into both eyes 4 times daily as needed for dry eyes   Next Dose Due:  None taken today.  Available as needed                                PRILOSEC PO   Take 20 mg by mouth every morning   Last time this was given:  20 mg on 8/30/2018  8:40 AM   Next Dose Due:  Due again tomorrow morning                                propranolol 80 MG 24 hr capsule   Commonly known as:  INDERAL LA   Take 80 mg by mouth daily For tremor   Last time this was given:  80 mg on 8/30/2018  8:40 AM   Next Dose Due:  Due again tomorrow morning                                REFRESH P.M. Oint   Apply to eye At Bedtime   Last time this was given:  8/29/2018  8:41 PM   Next Dose Due:  Due at bedtime tonight                                SYSTANE BALANCE 0.6 % Soln ophthalmic solution   Place 1 drop into both eyes 4 times daily as needed for dry eyes   Generic drug:  propylene glycol                                VIIBRYD PO   Take 20 mg by mouth daily   Last time this was given:  20 mg on 8/30/2018  8:40 AM   Next Dose Due:  Due again tomorrow morning                                XANAX PO   Take 0.25 mg by mouth 3 times daily   Last time this was given:  0.25 mg on 8/30/2018  8:40 AM   Next Dose Due:  Due twice more today

## 2018-08-22 NOTE — ANESTHESIA POSTPROCEDURE EVALUATION
Patient: Christina Cotto    Procedure(s):  Hand Assisted Laparoscopic Colostomy Reversal With Bilateral Ureteral Stents  - Wound Class: II-Clean Contaminated  bilateral ureteral stent placment  - Wound Class: II-Clean Contaminated    Diagnosis:Colostomy   Diagnosis Additional Information: Pre-operative diagnosis: Colostomy   Post-operative diagnosis Colostomy status  Procedure: Procedure(s):  Hand Assisted Laparoscopic Colostomy Reversal With Bilateral Ureteral Stents  - Wound Class: II-Clean Contaminated  bilateral ureteral stent alexander, cment  - Wound Class: II-Clean Contaminated        Anesthesia Type:  General, ETT    Note:  Anesthesia Post Evaluation    Patient location during evaluation: PACU  Patient participation: Able to fully participate in evaluation  Level of consciousness: awake and alert  Pain management: adequate  Airway patency: patent  Cardiovascular status: acceptable  Respiratory status: acceptable  Hydration status: acceptable  PONV: none     Anesthetic complications: None          Last vitals:  Vitals:    08/22/18 1225 08/22/18 1230 08/22/18 1245   BP: 126/73 134/75 133/71   Pulse:      Resp: 17 18 15   Temp:      SpO2: 99% 97% 96%         Electronically Signed By: Denny Caldwell MD  August 22, 2018  12:49 PM

## 2018-08-22 NOTE — ANESTHESIA PREPROCEDURE EVALUATION
Anesthesia Evaluation     . Pt has had prior anesthetic. Type: General    History of anesthetic complications   - PONV        ROS/MED HX    ENT/Pulmonary:     (+)sleep apnea, doesn't use CPAP , . .    Neurologic:     (+)TIA features: bleed from preeclampsia,     Cardiovascular:  - neg cardiovascular ROS       METS/Exercise Tolerance:     Hematologic: Comments: Lab Test        04/06/18 04/05/18 04/04/18                       0621          0632          0634          WBC          16.2*        17.8*        17.2*         HGB          11.0*        11.5*        11.7          MCV          101*         101*         101*          PLT          336          336          338            Lab Test        04/01/18 03/31/18 03/31/18 03/30/18                       0626          1817          1033          1158          NA           136           --          133          135           POTASSIUM    4.5           --          3.9          3.9           CHLORIDE     106           --          102          102           CO2          26            --          26           25            BUN          10            --          12           11            CR           0.79         0.85         0.82         0.80          ANIONGAP     4             --          5            8             JESSICA          8.6           --          9.2          9.3           GLC          129*          --          134*         155*                  Musculoskeletal:  - neg musculoskeletal ROS       GI/Hepatic:  - neg GI/hepatic ROS       Renal/Genitourinary:     (+) chronic renal disease, Nephrolithiasis ,       Endo: Comment: Class 3 obesity    (+) thyroid problem hypothyroidism, Obesity, .      Psychiatric:  - neg psychiatric ROS       Infectious Disease:  - neg infectious disease ROS       Malignancy:      - no malignancy   Other:    - neg other ROS                 Physical Exam  Normal systems: cardiovascular, pulmonary and dental    Airway    Mallampati: II  TM distance: >3 FB  Neck ROM: full    Dental     Cardiovascular       Pulmonary                     Anesthesia Plan      History & Physical Review  History and physical reviewed and following examination; no interval change.    ASA Status:  3 .    NPO Status:  > 8 hours    Plan for General and ETT with Intravenous induction. Maintenance will be Balanced.    PONV prophylaxis:  Ondansetron (or other 5HT-3) and Dexamethasone or Solumedrol       Postoperative Care  Postoperative pain management:  IV analgesics, Oral pain medications and Multi-modal analgesia.      Consents  Anesthetic plan, risks, benefits and alternatives discussed with:  Patient..                          .

## 2018-08-22 NOTE — BRIEF OP NOTE
[facility] Brief Operative Note    Pre-operative diagnosis: Colostomy    Post-operative diagnosis Colostomy status   Procedure: Procedure(s):  Hand Assisted Laparoscopic Colostomy Reversal With Bilateral Ureteral Stents  - Wound Class: II-Clean Contaminated  bilateral ureteral stent placment  - Wound Class: II-Clean Contaminated   Surgeon(s): Surgeon(s) and Role:  Panel 1:     * Rodrigo Claire MD - Primary     * Katerin Bedoya PA-C - Assisting    Panel 2:     * Kojo Gutierrez MD - Primary   Estimated blood loss: 10 mL    Specimens:   ID Type Source Tests Collected by Time Destination   A : rectal stump  Tissue Large Intestine, Rectum SURGICAL PATHOLOGY EXAM Rodrigo Claire MD 8/22/2018  9:56 AM    B : colostomy  Tissue Colon SURGICAL PATHOLOGY EXAM Rodrigo Claire MD 8/22/2018 11:06 AM       Findings: Minimal adhesions. Tension-free 25mm EEA stapled anastomosis created, negative leak test.

## 2018-08-22 NOTE — OP NOTE
Procedure Date: 08/22/2018      PREOPERATIVE DIAGNOSIS:  Need for identification of the ureters.      POSTOPERATIVE DIAGNOSIS:  Need for identification of the ureters.      PROCEDURE PERFORMED:  Cystoscopy, placement of bilateral ureteral stents.      ANESTHESIA:  General.      COMPLICATIONS:  None.      INDICATIONS FOR PROCEDURE:  Christina Cotto is a 68-year-old woman who is undergoing a colostomy takedown with Dr. Rodrigo Newman with the General Surgery Service today.  He has asked for my assistance in placing stents at the end the case.      DETAILS OF THE PROCEDURE:  The risks and benefits of the procedure are explained in detail to the patient and informed consent was obtained.  The patient was brought to the operating room and then placed supine on the table, where she underwent general anesthetic.  She was then moved down to the dorsal lithotomy position where she was prepped and draped in standard sterile fashion.      The procedure began by introducing the 22-Niuean rigid cystoscope through the urethra into the bladder to perform cystoscopy.  There were no urothelial abnormalities identified.  I cannulated the right orifice with a ureteral catheter, and this was passed until resistance was felt.  I then cannulated the left ureteral orifice with a ureteral catheter, and this was also passed until resistance was felt.  I backloaded it off the scope.  I drained the bladder with a 16-Niuean Hartman catheter.  The 3 drains were hooked up to the Goldberg adapter to down drain, and then they were secured to one another with Tegaderm.  The procedure was concluded.      The patient tolerated the procedure well.  Dr. Newman will now proceed with the patient's colostomy takedown, and please see his dictation for that surgery.  At the end of his surgery, he will remove the ureteral catheters and leave the Hartman catheter in place.               YESENIA ROMO MD             D: 08/22/2018   T: 08/22/2018   MT: DEWEY       Name:     GONZALO MA   MRN:      [id number]        Account:        [id number]   :      1950           Procedure Date: 2018      Document: [id number]

## 2018-08-22 NOTE — PROGRESS NOTES
SPIRITUAL HEALTH SERVICES Progress Note  Former[facility] Pre-Surgical     visited with Christina in response to an Epic request for a pre-surgical visit. Christina is having surgery today for a colostomy. She is a retired nurse, who taught nursing students for 30 years. Christina is from [facility].     Christina's , Chin and 2 of her 3 daughters were present with her in the room. Christina said that she is [employer] and that her jarad is a very important part of her life. She requested a prayer and a prayer was offered.     services remain available.      Ryann Lance  Chaplain Resident  [phone]

## 2018-08-22 NOTE — ANESTHESIA CARE TRANSFER NOTE
Patient: Christina Cotto    Procedure(s):  Hand Assisted Laparoscopic Colostomy Reversal With Bilateral Ureteral Stents  - Wound Class: II-Clean Contaminated  bilateral ureteral stent placment  - Wound Class: II-Clean Contaminated    Diagnosis: Colostomy   Diagnosis Additional Information: No value filed.    Anesthesia Type:   General, ETT     Note:  Airway :Face Mask  Patient transferred to:PACU  Handoff Report: Identifed the Patient, Identified the Reponsible Provider, Reviewed the pertinent medical history, Discussed the surgical course, Reviewed Intra-OP anesthesia mangement and issues during anesthesia, Set expectations for post-procedure period and Allowed opportunity for questions and acknowledgement of understanding      Vitals: (Last set prior to Anesthesia Care Transfer)    CRNA VITALS  8/22/2018 1144 - 8/22/2018 1224      8/22/2018             EKG: NSR                Electronically Signed By: Dean Dennis Severson, APRN CRNA  August 22, 2018  12:24 PM

## 2018-08-23 LAB
ANION GAP SERPL CALCULATED.3IONS-SCNC: 10 MMOL/L (ref 3–14)
BASOPHILS # BLD AUTO: 0 10E9/L (ref 0–0.2)
BASOPHILS NFR BLD AUTO: 0.1 %
BUN SERPL-MCNC: 16 MG/DL (ref 7–30)
CALCIUM SERPL-MCNC: 8.3 MG/DL (ref 8.5–10.1)
CHLORIDE SERPL-SCNC: 105 MMOL/L (ref 94–109)
CO2 SERPL-SCNC: 23 MMOL/L (ref 20–32)
COPATH REPORT: NORMAL
CREAT SERPL-MCNC: 1.37 MG/DL (ref 0.52–1.04)
DIFFERENTIAL METHOD BLD: ABNORMAL
EOSINOPHIL # BLD AUTO: 0 10E9/L (ref 0–0.7)
EOSINOPHIL NFR BLD AUTO: 0.1 %
ERYTHROCYTE [DISTWIDTH] IN BLOOD BY AUTOMATED COUNT: 13.2 % (ref 10–15)
GFR SERPL CREATININE-BSD FRML MDRD: 38 ML/MIN/1.7M2
GLUCOSE SERPL-MCNC: 133 MG/DL (ref 70–99)
HCT VFR BLD AUTO: 37.9 % (ref 35–47)
HGB BLD-MCNC: 12.1 G/DL (ref 11.7–15.7)
IMM GRANULOCYTES # BLD: 0.2 10E9/L (ref 0–0.4)
IMM GRANULOCYTES NFR BLD: 0.7 %
LYMPHOCYTES # BLD AUTO: 1.7 10E9/L (ref 0.8–5.3)
LYMPHOCYTES NFR BLD AUTO: 7.9 %
MCH RBC QN AUTO: 32.6 PG (ref 26.5–33)
MCHC RBC AUTO-ENTMCNC: 31.9 G/DL (ref 31.5–36.5)
MCV RBC AUTO: 102 FL (ref 78–100)
MONOCYTES # BLD AUTO: 2 10E9/L (ref 0–1.3)
MONOCYTES NFR BLD AUTO: 9.7 %
NEUTROPHILS # BLD AUTO: 16.9 10E9/L (ref 1.6–8.3)
NEUTROPHILS NFR BLD AUTO: 81.5 %
NRBC # BLD AUTO: 0 10*3/UL
NRBC BLD AUTO-RTO: 0 /100
PLATELET # BLD AUTO: 252 10E9/L (ref 150–450)
POTASSIUM SERPL-SCNC: 4.3 MMOL/L (ref 3.4–5.3)
RBC # BLD AUTO: 3.71 10E12/L (ref 3.8–5.2)
SODIUM SERPL-SCNC: 138 MMOL/L (ref 133–144)
WBC # BLD AUTO: 20.8 10E9/L (ref 4–11)

## 2018-08-23 PROCEDURE — A9270 NON-COVERED ITEM OR SERVICE: HCPCS | Mod: GY | Performed by: SURGERY

## 2018-08-23 PROCEDURE — 12000007 ZZH R&B INTERMEDIATE

## 2018-08-23 PROCEDURE — 0DJD8ZZ INSPECTION OF LOWER INTESTINAL TRACT, VIA NATURAL OR ARTIFICIAL OPENING ENDOSCOPIC: ICD-10-PCS | Performed by: SURGERY

## 2018-08-23 PROCEDURE — 85025 COMPLETE CBC W/AUTO DIFF WBC: CPT | Performed by: SURGERY

## 2018-08-23 PROCEDURE — 0TJB8ZZ INSPECTION OF BLADDER, VIA NATURAL OR ARTIFICIAL OPENING ENDOSCOPIC: ICD-10-PCS | Performed by: UROLOGY

## 2018-08-23 PROCEDURE — 80048 BASIC METABOLIC PNL TOTAL CA: CPT | Performed by: SURGERY

## 2018-08-23 PROCEDURE — 25000128 H RX IP 250 OP 636: Performed by: SURGERY

## 2018-08-23 PROCEDURE — 25000132 ZZH RX MED GY IP 250 OP 250 PS 637: Mod: GY | Performed by: SURGERY

## 2018-08-23 PROCEDURE — 0DSM0ZZ REPOSITION DESCENDING COLON, OPEN APPROACH: ICD-10-PCS | Performed by: SURGERY

## 2018-08-23 PROCEDURE — 36415 COLL VENOUS BLD VENIPUNCTURE: CPT | Performed by: SURGERY

## 2018-08-23 RX ADMIN — WHITE PETROLATUM: .15; .85 OINTMENT OPHTHALMIC at 21:18

## 2018-08-23 RX ADMIN — SODIUM CHLORIDE, POTASSIUM CHLORIDE, SODIUM LACTATE AND CALCIUM CHLORIDE: 600; 310; 30; 20 INJECTION, SOLUTION INTRAVENOUS at 08:38

## 2018-08-23 RX ADMIN — SODIUM CHLORIDE, POTASSIUM CHLORIDE, SODIUM LACTATE AND CALCIUM CHLORIDE: 600; 310; 30; 20 INJECTION, SOLUTION INTRAVENOUS at 00:35

## 2018-08-23 RX ADMIN — HEPARIN SODIUM 5000 UNITS: 5000 INJECTION, SOLUTION INTRAVENOUS; SUBCUTANEOUS at 05:58

## 2018-08-23 RX ADMIN — OMEPRAZOLE 20 MG: 20 CAPSULE, DELAYED RELEASE ORAL at 08:40

## 2018-08-23 RX ADMIN — ACETAMINOPHEN 975 MG: 325 TABLET, FILM COATED ORAL at 16:12

## 2018-08-23 RX ADMIN — BUPROPION HYDROCHLORIDE 300 MG: 150 TABLET, FILM COATED, EXTENDED RELEASE ORAL at 08:40

## 2018-08-23 RX ADMIN — SODIUM CHLORIDE, POTASSIUM CHLORIDE, SODIUM LACTATE AND CALCIUM CHLORIDE: 600; 310; 30; 20 INJECTION, SOLUTION INTRAVENOUS at 16:47

## 2018-08-23 RX ADMIN — ALPRAZOLAM 0.25 MG: 0.25 TABLET ORAL at 21:18

## 2018-08-23 RX ADMIN — ASPIRIN 81 MG 81 MG: 81 TABLET ORAL at 08:40

## 2018-08-23 RX ADMIN — ALPRAZOLAM 0.25 MG: 0.25 TABLET ORAL at 16:12

## 2018-08-23 RX ADMIN — DOCUSATE SODIUM 100 MG: 100 CAPSULE, LIQUID FILLED ORAL at 21:18

## 2018-08-23 RX ADMIN — ONDANSETRON 4 MG: 2 INJECTION INTRAMUSCULAR; INTRAVENOUS at 02:09

## 2018-08-23 RX ADMIN — VILAZODONE HYDROCHLORIDE 20 MG: 20 TABLET ORAL at 08:40

## 2018-08-23 RX ADMIN — LEVOTHYROXINE SODIUM 100 MCG: 100 TABLET ORAL at 08:40

## 2018-08-23 RX ADMIN — ALPRAZOLAM 0.25 MG: 0.25 TABLET ORAL at 08:40

## 2018-08-23 RX ADMIN — Medication 1 SPRAY: at 19:15

## 2018-08-23 RX ADMIN — HEPARIN SODIUM 5000 UNITS: 5000 INJECTION, SOLUTION INTRAVENOUS; SUBCUTANEOUS at 21:17

## 2018-08-23 RX ADMIN — DOCUSATE SODIUM 100 MG: 100 CAPSULE, LIQUID FILLED ORAL at 08:40

## 2018-08-23 RX ADMIN — HEPARIN SODIUM 5000 UNITS: 5000 INJECTION, SOLUTION INTRAVENOUS; SUBCUTANEOUS at 14:49

## 2018-08-23 RX ADMIN — ACETAMINOPHEN 975 MG: 325 TABLET, FILM COATED ORAL at 08:39

## 2018-08-23 ASSESSMENT — ACTIVITIES OF DAILY LIVING (ADL)
ADLS_ACUITY_SCORE: 12

## 2018-08-23 ASSESSMENT — PAIN DESCRIPTION - DESCRIPTORS: DESCRIPTORS: CONSTANT;STABBING

## 2018-08-23 NOTE — PLAN OF CARE
Problem: Patient Care Overview  Goal: Plan of Care/Patient Progress Review  Outcome: No Change  POD #1. VSS. Afebrile. Requiring 1 L oxygen in mid 90s. LS clear. BS absent, pt denies passing gas. Pain 6/10, PCA dilaudid, used 0.9 mg. NPO with ice and meds. Nausea during night, Zofran given with relief. PIV - LR @ 125 ml/hr. Hartman in place, cherry red to watermelon colored urine; 950 ml output. CARMENCITA drain, 85 ml output; did note large clot. Incisions covered, drainage noted; no change during the night. A1 to ambulate. A&O, calls appropriately for assistance. DC pending.     Problem: Pain, Acute (Adult)  Goal: Identify Related Risk Factors and Signs and Symptoms  Related risk factors and signs and symptoms are identified upon initiation of Human Response Clinical Practice Guideline (CPG).  Outcome: No Change  Pt rates pain 6/10, PCA dilaudid controlled pain at this time; used 0.9 mg during night.

## 2018-08-23 NOTE — PLAN OF CARE
Problem: Patient Care Overview  Goal: Plan of Care/Patient Progress Review  Pt POD 0 from Colostomy take down. VSS. Pain controlled w/ PCA, 1 mg utilized, as well as ice to site. Weaned to 1 LPM NC. Ls clear/dim. IS provided, encouraged Bowel sounds very faint, denied passing gas. Abd dressing marked x2 during shift due to increased drainage. CARMENCITA drain w/ 45 ml's bloody drainage. Hartman patent, cares completed, 300 ml's dark red urine output. Did not get out of bed. NPO w/ Ice chips. Continues on Capnography. Prn cough drop x1.

## 2018-08-23 NOTE — PROGRESS NOTES
POD #1 s/p colostomy reversal.    This morning pt is awake and oriented. She reports pain and nausea during the night due to her IV detaching. Nausea resolved. Pain lessened but still uncomfortable. Controlling pain with ice and PCA. Pt has not passed gas yet. She is currently eating only ice chips. Pt reports mild discomfort with nasal cannula.     VSS, no fever. Inspection of wound shows mild drainage and no signs of infection. Tenderness along incisions. Bowel sounds present in all four quadrants.  Lungs clear to auscultation bilaterally. RRR, S1 and S2 present, no M/R/G.     Pt is progressing as anticipated.    - Pt was instructed that Hartman catheter may be removed today if she feels comfortable with it.   - Diet may progress to popsicles and clear liquids.   - Start subcutaneous heparin for VTE ppx  - Assess wound tomorrow for packing removal  - Pt should attempt to ambulate today    Pt seen and examined with PA-S at bedside, agree with assessment  May have popsicles, hold on clears until flatus

## 2018-08-24 LAB
ANION GAP SERPL CALCULATED.3IONS-SCNC: 7 MMOL/L (ref 3–14)
BASOPHILS # BLD AUTO: 0.1 10E9/L (ref 0–0.2)
BASOPHILS NFR BLD AUTO: 0.3 %
BUN SERPL-MCNC: 13 MG/DL (ref 7–30)
CALCIUM SERPL-MCNC: 8.3 MG/DL (ref 8.5–10.1)
CHLORIDE SERPL-SCNC: 109 MMOL/L (ref 94–109)
CO2 SERPL-SCNC: 26 MMOL/L (ref 20–32)
CREAT SERPL-MCNC: 1.29 MG/DL (ref 0.52–1.04)
DIFFERENTIAL METHOD BLD: ABNORMAL
EOSINOPHIL # BLD AUTO: 0.5 10E9/L (ref 0–0.7)
EOSINOPHIL NFR BLD AUTO: 3.2 %
ERYTHROCYTE [DISTWIDTH] IN BLOOD BY AUTOMATED COUNT: 13.7 % (ref 10–15)
GFR SERPL CREATININE-BSD FRML MDRD: 41 ML/MIN/1.7M2
GLUCOSE SERPL-MCNC: 88 MG/DL (ref 70–99)
HCT VFR BLD AUTO: 37.8 % (ref 35–47)
HGB BLD-MCNC: 12 G/DL (ref 11.7–15.7)
IMM GRANULOCYTES # BLD: 0.1 10E9/L (ref 0–0.4)
IMM GRANULOCYTES NFR BLD: 0.5 %
LYMPHOCYTES # BLD AUTO: 2.3 10E9/L (ref 0.8–5.3)
LYMPHOCYTES NFR BLD AUTO: 15.1 %
MCH RBC QN AUTO: 32.8 PG (ref 26.5–33)
MCHC RBC AUTO-ENTMCNC: 31.7 G/DL (ref 31.5–36.5)
MCV RBC AUTO: 103 FL (ref 78–100)
MONOCYTES # BLD AUTO: 1.9 10E9/L (ref 0–1.3)
MONOCYTES NFR BLD AUTO: 12.2 %
NEUTROPHILS # BLD AUTO: 10.6 10E9/L (ref 1.6–8.3)
NEUTROPHILS NFR BLD AUTO: 68.7 %
NRBC # BLD AUTO: 0 10*3/UL
NRBC BLD AUTO-RTO: 0 /100
PLATELET # BLD AUTO: 255 10E9/L (ref 150–450)
POTASSIUM SERPL-SCNC: 3.9 MMOL/L (ref 3.4–5.3)
RBC # BLD AUTO: 3.66 10E12/L (ref 3.8–5.2)
SODIUM SERPL-SCNC: 142 MMOL/L (ref 133–144)
WBC # BLD AUTO: 15.4 10E9/L (ref 4–11)

## 2018-08-24 PROCEDURE — 25000132 ZZH RX MED GY IP 250 OP 250 PS 637: Mod: GY | Performed by: SURGERY

## 2018-08-24 PROCEDURE — 85025 COMPLETE CBC W/AUTO DIFF WBC: CPT | Performed by: SURGERY

## 2018-08-24 PROCEDURE — 25000128 H RX IP 250 OP 636: Performed by: SURGERY

## 2018-08-24 PROCEDURE — 12000007 ZZH R&B INTERMEDIATE

## 2018-08-24 PROCEDURE — A9270 NON-COVERED ITEM OR SERVICE: HCPCS | Mod: GY | Performed by: SURGERY

## 2018-08-24 PROCEDURE — 36415 COLL VENOUS BLD VENIPUNCTURE: CPT | Performed by: SURGERY

## 2018-08-24 PROCEDURE — 80048 BASIC METABOLIC PNL TOTAL CA: CPT | Performed by: SURGERY

## 2018-08-24 RX ADMIN — WHITE PETROLATUM: .15; .85 OINTMENT OPHTHALMIC at 20:28

## 2018-08-24 RX ADMIN — LEVOTHYROXINE SODIUM 100 MCG: 100 TABLET ORAL at 09:01

## 2018-08-24 RX ADMIN — ACETAMINOPHEN 975 MG: 325 TABLET, FILM COATED ORAL at 09:02

## 2018-08-24 RX ADMIN — DOCUSATE SODIUM 100 MG: 100 CAPSULE, LIQUID FILLED ORAL at 20:28

## 2018-08-24 RX ADMIN — ALPRAZOLAM 0.25 MG: 0.25 TABLET ORAL at 09:01

## 2018-08-24 RX ADMIN — SODIUM CHLORIDE, POTASSIUM CHLORIDE, SODIUM LACTATE AND CALCIUM CHLORIDE: 600; 310; 30; 20 INJECTION, SOLUTION INTRAVENOUS at 17:56

## 2018-08-24 RX ADMIN — ALPRAZOLAM 0.25 MG: 0.25 TABLET ORAL at 20:29

## 2018-08-24 RX ADMIN — SODIUM CHLORIDE, POTASSIUM CHLORIDE, SODIUM LACTATE AND CALCIUM CHLORIDE: 600; 310; 30; 20 INJECTION, SOLUTION INTRAVENOUS at 09:10

## 2018-08-24 RX ADMIN — HEPARIN SODIUM 5000 UNITS: 5000 INJECTION, SOLUTION INTRAVENOUS; SUBCUTANEOUS at 06:08

## 2018-08-24 RX ADMIN — OMEPRAZOLE 20 MG: 20 CAPSULE, DELAYED RELEASE ORAL at 09:02

## 2018-08-24 RX ADMIN — ACETAMINOPHEN 975 MG: 325 TABLET, FILM COATED ORAL at 00:17

## 2018-08-24 RX ADMIN — ALPRAZOLAM 0.25 MG: 0.25 TABLET ORAL at 15:46

## 2018-08-24 RX ADMIN — HEPARIN SODIUM 5000 UNITS: 5000 INJECTION, SOLUTION INTRAVENOUS; SUBCUTANEOUS at 20:28

## 2018-08-24 RX ADMIN — SODIUM CHLORIDE, POTASSIUM CHLORIDE, SODIUM LACTATE AND CALCIUM CHLORIDE: 600; 310; 30; 20 INJECTION, SOLUTION INTRAVENOUS at 00:02

## 2018-08-24 RX ADMIN — DOCUSATE SODIUM 100 MG: 100 CAPSULE, LIQUID FILLED ORAL at 09:02

## 2018-08-24 RX ADMIN — BUPROPION HYDROCHLORIDE 300 MG: 150 TABLET, FILM COATED, EXTENDED RELEASE ORAL at 09:02

## 2018-08-24 RX ADMIN — ASPIRIN 81 MG 81 MG: 81 TABLET ORAL at 09:01

## 2018-08-24 RX ADMIN — ACETAMINOPHEN 975 MG: 325 TABLET, FILM COATED ORAL at 15:46

## 2018-08-24 RX ADMIN — HEPARIN SODIUM 5000 UNITS: 5000 INJECTION, SOLUTION INTRAVENOUS; SUBCUTANEOUS at 13:35

## 2018-08-24 RX ADMIN — VILAZODONE HYDROCHLORIDE 20 MG: 20 TABLET ORAL at 09:01

## 2018-08-24 ASSESSMENT — ACTIVITIES OF DAILY LIVING (ADL)
ADLS_ACUITY_SCORE: 16

## 2018-08-24 NOTE — PLAN OF CARE
Problem: Patient Care Overview  Goal: Plan of Care/Patient Progress Review  Outcome: Improving  POD #2. VSS. Afebrile. LS clear. BS hypoactive, patient passing lots of gas and belching. Denies nausea. Pain 4/10, PCA dilaudid managing well. NPO with ice. SBA to ambulate. CARMENCITA drain had 35 ml output, large clot noted. Voiding without issues, did pass blood per rectum during night, no formed stool. PIV - LR @ 125 ml/hr. Incisions covered, drainage noted that's dried. A&O, calls approprietly for assistance. Creat 1.37, WBC 20.8. DC pending.     Problem: Pain, Acute (Adult)  Goal: Identify Related Risk Factors and Signs and Symptoms  Related risk factors and signs and symptoms are identified upon initiation of Human Response Clinical Practice Guideline (CPG).   Outcome: Improving  Pt rates pain max 4/10, PCA dilaudid used 0.7 mg during the night.

## 2018-08-24 NOTE — PLAN OF CARE
Pt up ind. LS clear, using IS. On RA. Stress incont with coughing. BS+ Flatus+ Abdominal incision drsgs changed by surgery. CARMENCITA drsg changed. Dilaudid PCA controlling pain, increased to 0.2mg bumps. IVF infusing via PIV. Advanced to clear liquids, tolerating, denies nausea. Voiding adequate amount, urine gurinder. Drops of blood from rectum.

## 2018-08-24 NOTE — PROGRESS NOTES
"POD #2 s/p colostomy reversal    Pt is awake, oriented, and in good spirits. She slept for 4 hours last night. Pain is 1/10 without movement and 4/10 with movement. No n/v. She reports passing gas and belching. \"Droplets\" of blood from rectum. Urine is less bloody than yesterday, \"getting clearer.\" Pt tolerated popsicles well. Pt reports getting up and walking to nurses station. Pt also has productive cough that she reports causes stress incontinence when she coughs. She reports this has never happened before. Pt reports h/a.    VSS, no fever. Inspection of wound shows mild drainage and no signs of infection. Darkness around left lower incision. Bowel sounds hypoactive in all four quadrants.     Pt is progressing as anticipated.    - Diet may progress to clear liquids  - Pt should continue to ambulate as tolerated    Polly MUNOZ    Pt seen and examined, care plan discussed with TAMMY  Having some flatus, walking TID, no nausea, pain okay  Armin removed, dressed dry, anticipate some weeping from incisions in next day or two.  Okay for clears  Decrease IVF  Drain until tolerating diet  "

## 2018-08-24 NOTE — PLAN OF CARE
Problem: Patient Care Overview  Goal: Plan of Care/Patient Progress Review  Outcome: No Change  VSS, pt desats when sleeping. Ls-clear, congested cough, sputum reported by pt to be greenish, improved now.  IS use encouraged.  Assist x1 with IV pole.  Hartman d/c'd today, pt voiding, having some stress incontinence.  Shadowing noted in abdominal dressings, middle dressing changed due to moderate drainage.   Pt passed some blood per rectum.  NPO except meds and ice chips, can have 1 popsicle per shift, tolerating well so far.  Hypo bowel sounds, pt passed gas this evening. CARMENCITA drain in place, 55 out this shift.  Dilaudid pca for pain control and ice packs. Abdominal binder ordered.  scopolamine patch in place.  LR running at 125ml/h.

## 2018-08-25 LAB — PLATELET # BLD AUTO: 233 10E9/L (ref 150–450)

## 2018-08-25 PROCEDURE — 25000128 H RX IP 250 OP 636: Performed by: SURGERY

## 2018-08-25 PROCEDURE — 36415 COLL VENOUS BLD VENIPUNCTURE: CPT | Performed by: SURGERY

## 2018-08-25 PROCEDURE — 25000132 ZZH RX MED GY IP 250 OP 250 PS 637: Mod: GY | Performed by: SURGERY

## 2018-08-25 PROCEDURE — A9270 NON-COVERED ITEM OR SERVICE: HCPCS | Mod: GY | Performed by: SURGERY

## 2018-08-25 PROCEDURE — 85049 AUTOMATED PLATELET COUNT: CPT | Performed by: SURGERY

## 2018-08-25 PROCEDURE — 12000007 ZZH R&B INTERMEDIATE

## 2018-08-25 RX ADMIN — HEPARIN SODIUM 5000 UNITS: 5000 INJECTION, SOLUTION INTRAVENOUS; SUBCUTANEOUS at 14:43

## 2018-08-25 RX ADMIN — ACETAMINOPHEN 975 MG: 325 TABLET, FILM COATED ORAL at 08:02

## 2018-08-25 RX ADMIN — ALPRAZOLAM 0.25 MG: 0.25 TABLET ORAL at 16:43

## 2018-08-25 RX ADMIN — DOCUSATE SODIUM 100 MG: 100 CAPSULE, LIQUID FILLED ORAL at 08:01

## 2018-08-25 RX ADMIN — BUPROPION HYDROCHLORIDE 300 MG: 150 TABLET, FILM COATED, EXTENDED RELEASE ORAL at 08:02

## 2018-08-25 RX ADMIN — DOCUSATE SODIUM 100 MG: 100 CAPSULE, LIQUID FILLED ORAL at 21:47

## 2018-08-25 RX ADMIN — ALPRAZOLAM 0.25 MG: 0.25 TABLET ORAL at 08:02

## 2018-08-25 RX ADMIN — LEVOTHYROXINE SODIUM 100 MCG: 100 TABLET ORAL at 08:02

## 2018-08-25 RX ADMIN — SODIUM CHLORIDE, POTASSIUM CHLORIDE, SODIUM LACTATE AND CALCIUM CHLORIDE: 600; 310; 30; 20 INJECTION, SOLUTION INTRAVENOUS at 03:51

## 2018-08-25 RX ADMIN — ALPRAZOLAM 0.25 MG: 0.25 TABLET ORAL at 21:47

## 2018-08-25 RX ADMIN — ASPIRIN 81 MG 81 MG: 81 TABLET ORAL at 08:02

## 2018-08-25 RX ADMIN — ACETAMINOPHEN 975 MG: 325 TABLET, FILM COATED ORAL at 00:04

## 2018-08-25 RX ADMIN — VILAZODONE HYDROCHLORIDE 20 MG: 20 TABLET ORAL at 08:01

## 2018-08-25 RX ADMIN — Medication: at 21:47

## 2018-08-25 RX ADMIN — SODIUM CHLORIDE, POTASSIUM CHLORIDE, SODIUM LACTATE AND CALCIUM CHLORIDE: 600; 310; 30; 20 INJECTION, SOLUTION INTRAVENOUS at 19:53

## 2018-08-25 RX ADMIN — HEPARIN SODIUM 5000 UNITS: 5000 INJECTION, SOLUTION INTRAVENOUS; SUBCUTANEOUS at 21:47

## 2018-08-25 RX ADMIN — OMEPRAZOLE 20 MG: 20 CAPSULE, DELAYED RELEASE ORAL at 08:02

## 2018-08-25 RX ADMIN — WHITE PETROLATUM: .15; .85 OINTMENT OPHTHALMIC at 21:47

## 2018-08-25 RX ADMIN — HEPARIN SODIUM 5000 UNITS: 5000 INJECTION, SOLUTION INTRAVENOUS; SUBCUTANEOUS at 06:33

## 2018-08-25 ASSESSMENT — ACTIVITIES OF DAILY LIVING (ADL)
ADLS_ACUITY_SCORE: 16

## 2018-08-25 NOTE — PLAN OF CARE
Problem: Patient Care Overview  Goal: Plan of Care/Patient Progress Review  Outcome: Improving  Tolerating clears. Up with SBA. Voiding well. Dressing changed to abdomen and CARMENCITA site. Productive cough. C/o Headache, MD aware, will try caffeine and avoid PCA unless needed.

## 2018-08-25 NOTE — PLAN OF CARE
Pt up ind. LS clear. BS+ Flatus+ Voiding adequate amount, urine clear. Incisions covered. Pain controlled with PCA 0.2/10/1.2, used 0.2mg. CARMENCITA with 65mL out. IVF infusing via PIV. Amb in halls.

## 2018-08-25 NOTE — PROGRESS NOTES
"[facility]   General Surgery Progress Note           Assessment and Plan:   Assessment:   POD#3 s/p Procedure(s):  Laparoscopic hand-assisted colostomy reversal - Wound Class: II-Clean Contaminated  Cystoscopy, placement of bilateral ureteral stents - Wound Class: II-Clean Contaminated        Plan:   -IV fluids decreased  IV fluid / PO titration  Pain management: patient controlled anesthesia (PCA)  Nausea and vomiting control measures  -stress ulcer prophylaxis and prophylaxis against venous thromboembolism  -clear liquid diet now advance to fulls cautously  -         Interval History:   Passing \"lots\" of gas         Physical Exam:   Blood pressure 104/63, pulse 55, temperature 96.4  F (35.8  C), temperature source Oral, resp. rate 20, height 1.626 m (5' 4\"), weight 107.5 kg (237 lb), SpO2 97 %, not currently breastfeeding.    I/O last 3 completed shifts:  In: 4903 [P.O.:870; I.V.:4033]  Out: 2595 [Urine:2510; Drains:85]    Abdomen:   soft, non-distended, tenderness noted at incision   Inc(s) - partially open, minimal drainage, no sign of infection      Ok - serosanguinous          Data:       Recent Labs  Lab 08/24/18  0639 08/23/18  0632 08/22/18  1525   WBC 15.4* 20.8*  --    HGB 12.0 12.1  --    HCT 37.8 37.9  --    * 102*  --     252 284       Jn Ozuna MD     "

## 2018-08-26 PROCEDURE — 12000007 ZZH R&B INTERMEDIATE

## 2018-08-26 PROCEDURE — 25000128 H RX IP 250 OP 636: Performed by: SURGERY

## 2018-08-26 PROCEDURE — A9270 NON-COVERED ITEM OR SERVICE: HCPCS | Mod: GY | Performed by: SURGERY

## 2018-08-26 PROCEDURE — 25000132 ZZH RX MED GY IP 250 OP 250 PS 637: Mod: GY | Performed by: SURGERY

## 2018-08-26 RX ADMIN — VILAZODONE HYDROCHLORIDE 20 MG: 20 TABLET ORAL at 08:41

## 2018-08-26 RX ADMIN — BUPROPION HYDROCHLORIDE 300 MG: 150 TABLET, FILM COATED, EXTENDED RELEASE ORAL at 08:40

## 2018-08-26 RX ADMIN — Medication: at 06:22

## 2018-08-26 RX ADMIN — LEVOTHYROXINE SODIUM 100 MCG: 100 TABLET ORAL at 08:42

## 2018-08-26 RX ADMIN — PROPRANOLOL HYDROCHLORIDE 80 MG: 80 CAPSULE, EXTENDED RELEASE ORAL at 08:42

## 2018-08-26 RX ADMIN — HEPARIN SODIUM 5000 UNITS: 5000 INJECTION, SOLUTION INTRAVENOUS; SUBCUTANEOUS at 22:33

## 2018-08-26 RX ADMIN — DOCUSATE SODIUM 100 MG: 100 CAPSULE, LIQUID FILLED ORAL at 22:27

## 2018-08-26 RX ADMIN — SODIUM CHLORIDE, POTASSIUM CHLORIDE, SODIUM LACTATE AND CALCIUM CHLORIDE: 600; 310; 30; 20 INJECTION, SOLUTION INTRAVENOUS at 15:01

## 2018-08-26 RX ADMIN — OMEPRAZOLE 20 MG: 20 CAPSULE, DELAYED RELEASE ORAL at 08:41

## 2018-08-26 RX ADMIN — HEPARIN SODIUM 5000 UNITS: 5000 INJECTION, SOLUTION INTRAVENOUS; SUBCUTANEOUS at 12:58

## 2018-08-26 RX ADMIN — WHITE PETROLATUM: .15; .85 OINTMENT OPHTHALMIC at 22:30

## 2018-08-26 RX ADMIN — ALPRAZOLAM 0.25 MG: 0.25 TABLET ORAL at 22:27

## 2018-08-26 RX ADMIN — ALPRAZOLAM 0.25 MG: 0.25 TABLET ORAL at 16:04

## 2018-08-26 RX ADMIN — ASPIRIN 81 MG 81 MG: 81 TABLET ORAL at 08:42

## 2018-08-26 RX ADMIN — HEPARIN SODIUM 5000 UNITS: 5000 INJECTION, SOLUTION INTRAVENOUS; SUBCUTANEOUS at 06:22

## 2018-08-26 RX ADMIN — DOCUSATE SODIUM 100 MG: 100 CAPSULE, LIQUID FILLED ORAL at 08:41

## 2018-08-26 RX ADMIN — ALPRAZOLAM 0.25 MG: 0.25 TABLET ORAL at 08:41

## 2018-08-26 ASSESSMENT — ACTIVITIES OF DAILY LIVING (ADL)
ADLS_ACUITY_SCORE: 16

## 2018-08-26 NOTE — PROGRESS NOTES
"[facility]   General Surgery Progress Note           Assessment and Plan:   Assessment:   POD#4 s/p Procedure(s):  Laparoscopic hand-assisted colostomy reversal - Wound Class: II-Clean Contaminated  Cystoscopy, placement of bilateral ureteral stents - Wound Class: II-Clean Contaminated        Plan:   -IV fluids continued  -stress ulcer prophylaxis and prophylaxis against venous thromboembolism  -full liquid diet, advance to low residue after BM         Interval History:   Passing gas but no stool, abdomen sore with movement         Physical Exam:   Blood pressure 121/51, pulse 73, temperature 98.4  F (36.9  C), temperature source Oral, resp. rate 18, height 1.626 m (5' 4\"), weight 107.5 kg (237 lb), SpO2 98 %, not currently breastfeeding.    I/O last 3 completed shifts:  In: 2271 [P.O.:1480; I.V.:791]  Out: 2425 [Urine:2350; Drains:75]    Abdomen:   soft, non-distended, tenderness noted at incision    Inc(s) - minimal drainage, no clinical infection     Ok - serous          Data:       Recent Labs  Lab 08/25/18  0724 08/24/18  0639 08/23/18  0632   WBC  --  15.4* 20.8*   HGB  --  12.0 12.1   HCT  --  37.8 37.9   MCV  --  103* 102*    255 252       Jn Ozuna MD     "

## 2018-08-26 NOTE — PLAN OF CARE
Problem: Patient Care Overview  Goal: Plan of Care/Patient Progress Review  Outcome: Improving  0.2 total from PCA. Up sba. Voiding well. CARMENCITA site continues to drain, dressing changed x1. omid full liquids. Uneventful night.

## 2018-08-26 NOTE — PLAN OF CARE
Pt up ind. VSS LS clear. BS hypo. Flatus+ IVF infusing via PIV. PCA 0.2/10/1.2, didn't use at all. CARMENCITA with 15mL out, serous, leaking at site. Tolerating full liquids, denies nausea. Incisions covered. Showered.

## 2018-08-27 PROCEDURE — 25000132 ZZH RX MED GY IP 250 OP 250 PS 637: Mod: GY | Performed by: SURGERY

## 2018-08-27 PROCEDURE — A9270 NON-COVERED ITEM OR SERVICE: HCPCS | Mod: GY | Performed by: SURGERY

## 2018-08-27 PROCEDURE — 12000007 ZZH R&B INTERMEDIATE

## 2018-08-27 PROCEDURE — 25000128 H RX IP 250 OP 636: Performed by: SURGERY

## 2018-08-27 RX ORDER — IBUPROFEN 600 MG/1
600 TABLET, FILM COATED ORAL EVERY 6 HOURS PRN
Status: DISCONTINUED | OUTPATIENT
Start: 2018-08-27 | End: 2018-08-30 | Stop reason: HOSPADM

## 2018-08-27 RX ADMIN — DOCUSATE SODIUM 100 MG: 100 CAPSULE, LIQUID FILLED ORAL at 08:23

## 2018-08-27 RX ADMIN — IBUPROFEN 600 MG: 600 TABLET ORAL at 13:14

## 2018-08-27 RX ADMIN — IBUPROFEN 600 MG: 600 TABLET ORAL at 19:39

## 2018-08-27 RX ADMIN — VILAZODONE HYDROCHLORIDE 20 MG: 20 TABLET ORAL at 08:23

## 2018-08-27 RX ADMIN — ASPIRIN 81 MG 81 MG: 81 TABLET ORAL at 08:24

## 2018-08-27 RX ADMIN — ALPRAZOLAM 0.25 MG: 0.25 TABLET ORAL at 16:18

## 2018-08-27 RX ADMIN — BUPROPION HYDROCHLORIDE 300 MG: 150 TABLET, FILM COATED, EXTENDED RELEASE ORAL at 08:22

## 2018-08-27 RX ADMIN — DOCUSATE SODIUM 100 MG: 100 CAPSULE, LIQUID FILLED ORAL at 21:17

## 2018-08-27 RX ADMIN — HEPARIN SODIUM 5000 UNITS: 5000 INJECTION, SOLUTION INTRAVENOUS; SUBCUTANEOUS at 06:25

## 2018-08-27 RX ADMIN — Medication: at 06:23

## 2018-08-27 RX ADMIN — PROPRANOLOL HYDROCHLORIDE 80 MG: 80 CAPSULE, EXTENDED RELEASE ORAL at 08:24

## 2018-08-27 RX ADMIN — ALPRAZOLAM 0.25 MG: 0.25 TABLET ORAL at 21:17

## 2018-08-27 RX ADMIN — OMEPRAZOLE 20 MG: 20 CAPSULE, DELAYED RELEASE ORAL at 08:23

## 2018-08-27 RX ADMIN — Medication 1 SPRAY: at 21:18

## 2018-08-27 RX ADMIN — HEPARIN SODIUM 5000 UNITS: 5000 INJECTION, SOLUTION INTRAVENOUS; SUBCUTANEOUS at 21:17

## 2018-08-27 RX ADMIN — HEPARIN SODIUM 5000 UNITS: 5000 INJECTION, SOLUTION INTRAVENOUS; SUBCUTANEOUS at 13:14

## 2018-08-27 RX ADMIN — WHITE PETROLATUM: .15; .85 OINTMENT OPHTHALMIC at 21:17

## 2018-08-27 RX ADMIN — ALPRAZOLAM 0.25 MG: 0.25 TABLET ORAL at 08:23

## 2018-08-27 RX ADMIN — LEVOTHYROXINE SODIUM 100 MCG: 100 TABLET ORAL at 08:22

## 2018-08-27 ASSESSMENT — ACTIVITIES OF DAILY LIVING (ADL)
ADLS_ACUITY_SCORE: 16

## 2018-08-27 ASSESSMENT — PAIN DESCRIPTION - DESCRIPTORS: DESCRIPTORS: ACHING;SORE

## 2018-08-27 NOTE — PLAN OF CARE
Problem: Patient Care Overview  Goal: Plan of Care/Patient Progress Review  Outcome: Improving  Tolerating Full liquids. Up sba. Voiding well. No BM yet. Minimal PCA use. CARMENCITA continues to have drainage at insertion site but  Minimal serous output. Uneventful night. Slept well

## 2018-08-27 NOTE — PROGRESS NOTES
"POD#5 s/p colostomy reversal    Pt is in good-spirits. Pt reports a stool \"the size of half of a pencil eraser.\" PIV has been removed, stopping PCA and IVF. No more h/a with cessation of dilaudid. Pain is ok, patient would prefer ibuprofen to acetaminophen. Pt reports bouts of slight nausea. Cough is still present, adhering to IS. Pt reports leakage from insertion site of R CARMENCITA drain that soaks through dressing.     Tmax 98.3 P56 124/43  1100po  35 CARMENCITA  Inspection of wound shows mild drainage and no signs of infection. Mild clear drainage from insertion site of R CARMENCITA drain. Slightly increased drainage from L lower incision. Bowel sounds active in all four quadrants.     Pt is progressing as anticipated.   - Okay to try low residue diet.  - Continue stress ulcer and VTE prophylaxis.  - Switch from acetaminophen to ibuprofen      JILLIAN Basilio-S    Pt seen and examined, by me, agree with above notation.  "

## 2018-08-27 NOTE — PLAN OF CARE
Problem: Patient Care Overview  Goal: Plan of Care/Patient Progress Review  Outcome: Improving  Pt up independently.  Alert and oriented x4. VSS. C/o abd pain. Ibuprofen given x1 for  relief. BS hypoactive. Passing gas. Advanced to low-fiber diet. Tolerating well, denies nausea. BM x1, pt states some clots, flushed before I could assess. ABD dressings CDI. CARMENCITA in place 8 ml serous drainage out. Leaking around site, dressing changed. Surgery following.

## 2018-08-28 LAB
ANION GAP SERPL CALCULATED.3IONS-SCNC: 6 MMOL/L (ref 3–14)
BUN SERPL-MCNC: 6 MG/DL (ref 7–30)
CA-I BLD-MCNC: 4.4 MG/DL (ref 4.4–5.2)
CALCIUM SERPL-MCNC: 8.1 MG/DL (ref 8.5–10.1)
CHLORIDE SERPL-SCNC: 106 MMOL/L (ref 94–109)
CO2 SERPL-SCNC: 30 MMOL/L (ref 20–32)
CREAT SERPL-MCNC: 0.97 MG/DL (ref 0.52–1.04)
ERYTHROCYTE [DISTWIDTH] IN BLOOD BY AUTOMATED COUNT: 13.2 % (ref 10–15)
GFR SERPL CREATININE-BSD FRML MDRD: 57 ML/MIN/1.7M2
GLUCOSE SERPL-MCNC: 79 MG/DL (ref 70–99)
HCT VFR BLD AUTO: 33.8 % (ref 35–47)
HGB BLD-MCNC: 10.7 G/DL (ref 11.7–15.7)
MCH RBC QN AUTO: 32.6 PG (ref 26.5–33)
MCHC RBC AUTO-ENTMCNC: 31.7 G/DL (ref 31.5–36.5)
MCV RBC AUTO: 103 FL (ref 78–100)
PLATELET # BLD AUTO: 296 10E9/L (ref 150–450)
PLATELET # BLD AUTO: 310 10E9/L (ref 150–450)
POTASSIUM SERPL-SCNC: 3.5 MMOL/L (ref 3.4–5.3)
RBC # BLD AUTO: 3.28 10E12/L (ref 3.8–5.2)
SODIUM SERPL-SCNC: 142 MMOL/L (ref 133–144)
WBC # BLD AUTO: 14 10E9/L (ref 4–11)

## 2018-08-28 PROCEDURE — 12000007 ZZH R&B INTERMEDIATE

## 2018-08-28 PROCEDURE — 25000128 H RX IP 250 OP 636: Performed by: INTERNAL MEDICINE

## 2018-08-28 PROCEDURE — 25000132 ZZH RX MED GY IP 250 OP 250 PS 637: Mod: GY | Performed by: PHYSICIAN ASSISTANT

## 2018-08-28 PROCEDURE — 25000128 H RX IP 250 OP 636: Performed by: SURGERY

## 2018-08-28 PROCEDURE — A9270 NON-COVERED ITEM OR SERVICE: HCPCS | Mod: GY | Performed by: SURGERY

## 2018-08-28 PROCEDURE — 99207 ZZC CONSULT E&M CHANGED TO INITIAL LEVEL: CPT | Performed by: INTERNAL MEDICINE

## 2018-08-28 PROCEDURE — 99222 1ST HOSP IP/OBS MODERATE 55: CPT | Performed by: INTERNAL MEDICINE

## 2018-08-28 PROCEDURE — 85049 AUTOMATED PLATELET COUNT: CPT | Performed by: SURGERY

## 2018-08-28 PROCEDURE — 85027 COMPLETE CBC AUTOMATED: CPT | Performed by: INTERNAL MEDICINE

## 2018-08-28 PROCEDURE — 25000132 ZZH RX MED GY IP 250 OP 250 PS 637: Mod: GY | Performed by: SURGERY

## 2018-08-28 PROCEDURE — 36415 COLL VENOUS BLD VENIPUNCTURE: CPT | Performed by: INTERNAL MEDICINE

## 2018-08-28 PROCEDURE — 80048 BASIC METABOLIC PNL TOTAL CA: CPT | Performed by: INTERNAL MEDICINE

## 2018-08-28 PROCEDURE — A9270 NON-COVERED ITEM OR SERVICE: HCPCS | Mod: GY | Performed by: PHYSICIAN ASSISTANT

## 2018-08-28 PROCEDURE — 82330 ASSAY OF CALCIUM: CPT | Performed by: INTERNAL MEDICINE

## 2018-08-28 PROCEDURE — 36415 COLL VENOUS BLD VENIPUNCTURE: CPT | Performed by: SURGERY

## 2018-08-28 RX ORDER — SENNOSIDES 8.6 MG
8.6 TABLET ORAL 2 TIMES DAILY PRN
Status: DISCONTINUED | OUTPATIENT
Start: 2018-08-28 | End: 2018-08-30 | Stop reason: HOSPADM

## 2018-08-28 RX ADMIN — LEVOTHYROXINE SODIUM 100 MCG: 100 TABLET ORAL at 08:12

## 2018-08-28 RX ADMIN — ALPRAZOLAM 0.25 MG: 0.25 TABLET ORAL at 16:03

## 2018-08-28 RX ADMIN — SENNOSIDES 8.6 MG: 8.6 TABLET, FILM COATED ORAL at 21:13

## 2018-08-28 RX ADMIN — SODIUM CHLORIDE 500 ML: 9 INJECTION, SOLUTION INTRAVENOUS at 20:28

## 2018-08-28 RX ADMIN — BUPROPION HYDROCHLORIDE 300 MG: 150 TABLET, FILM COATED, EXTENDED RELEASE ORAL at 08:11

## 2018-08-28 RX ADMIN — IBUPROFEN 600 MG: 600 TABLET ORAL at 21:55

## 2018-08-28 RX ADMIN — PROPRANOLOL HYDROCHLORIDE 80 MG: 80 CAPSULE, EXTENDED RELEASE ORAL at 08:11

## 2018-08-28 RX ADMIN — ALPRAZOLAM 0.25 MG: 0.25 TABLET ORAL at 21:09

## 2018-08-28 RX ADMIN — HEPARIN SODIUM 5000 UNITS: 5000 INJECTION, SOLUTION INTRAVENOUS; SUBCUTANEOUS at 21:09

## 2018-08-28 RX ADMIN — DOCUSATE SODIUM 100 MG: 100 CAPSULE, LIQUID FILLED ORAL at 08:11

## 2018-08-28 RX ADMIN — OMEPRAZOLE 20 MG: 20 CAPSULE, DELAYED RELEASE ORAL at 08:11

## 2018-08-28 RX ADMIN — IBUPROFEN 600 MG: 600 TABLET ORAL at 08:10

## 2018-08-28 RX ADMIN — ACETAMINOPHEN 650 MG: 325 TABLET, FILM COATED ORAL at 17:17

## 2018-08-28 RX ADMIN — WHITE PETROLATUM: .15; .85 OINTMENT OPHTHALMIC at 21:10

## 2018-08-28 RX ADMIN — VILAZODONE HYDROCHLORIDE 20 MG: 20 TABLET ORAL at 08:11

## 2018-08-28 RX ADMIN — IBUPROFEN 600 MG: 600 TABLET ORAL at 16:03

## 2018-08-28 RX ADMIN — ASPIRIN 81 MG 81 MG: 81 TABLET ORAL at 08:11

## 2018-08-28 RX ADMIN — ALPRAZOLAM 0.25 MG: 0.25 TABLET ORAL at 08:12

## 2018-08-28 RX ADMIN — HEPARIN SODIUM 5000 UNITS: 5000 INJECTION, SOLUTION INTRAVENOUS; SUBCUTANEOUS at 13:57

## 2018-08-28 RX ADMIN — HEPARIN SODIUM 5000 UNITS: 5000 INJECTION, SOLUTION INTRAVENOUS; SUBCUTANEOUS at 06:10

## 2018-08-28 ASSESSMENT — PAIN DESCRIPTION - DESCRIPTORS
DESCRIPTORS: ACHING;SHARP
DESCRIPTORS: ACHING;SHARP
DESCRIPTORS: SORE
DESCRIPTORS: ACHING;SHARP

## 2018-08-28 ASSESSMENT — ACTIVITIES OF DAILY LIVING (ADL)
ADLS_ACUITY_SCORE: 16

## 2018-08-28 NOTE — PLAN OF CARE
Problem: Patient Care Overview  Goal: Plan of Care/Patient Progress Review  POD6 colostomy TD. Passing gas, BS active, denies nausea. Awaiting formed BM. Tolerating low fiber diet but reports poor appetite. Midline dressings CDI, CARMENCITA dressing with moderate amount serous drainage. CARMENCITA stripped x2 with 10ml serosanguinous output. Reports mild abdominal pain around incisions but declines need for pain medication. /45 - encouraged oral hydration. VSS and afebrile. No PIV access - per previous RN MD aware. Up independently in room. Pt hopeful to discharge home today.
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
## 2018-08-28 NOTE — PLAN OF CARE
Problem: Patient Care Overview  Goal: Plan of Care/Patient Progress Review  Patient alerted and oriented.  Up independently, walked in hallway.  Low fiber diet.  Lung sounds clear.  Bowel sounds normoactive.  Pain managed with prn ibuprofen.  Dressing was changed this am per PA.  Plan is for patient to have a BM prior to discharge.  Will continue to monitor.

## 2018-08-28 NOTE — PLAN OF CARE
Problem: Patient Care Overview  Goal: Plan of Care/Patient Progress Review  Outcome: Improving  Ambulatory Status:  Pt up independent.  VS:  Vital signs:  Temp: 98.8  F (37.1  C) Temp src: Oral BP: 113/42 Pulse: 67 Heart Rate: 55 Resp: 20 SpO2: 95 % O2 Device: None (Room air)  Pain:  Continues in left lower stomach, PO ibuprofen given x1  Resp: LS clear but wheezing noted  GI:  no nausea.  fair appetite and on low fiber diet.  BS hypoactive.  Passing flatus.  Last BM today in AM, none this shift.  :  Voiding well  Skin:  CARMENCITA dressing was changed at start of shift. Midline dressing CDI  Labs:  None today  Consults:  surgery  Disposition:  Home possibly tomorrow

## 2018-08-29 LAB — LACTATE BLD-SCNC: 1.1 MMOL/L (ref 0.7–2)

## 2018-08-29 PROCEDURE — 83605 ASSAY OF LACTIC ACID: CPT | Performed by: INTERNAL MEDICINE

## 2018-08-29 PROCEDURE — 99232 SBSQ HOSP IP/OBS MODERATE 35: CPT | Performed by: INTERNAL MEDICINE

## 2018-08-29 PROCEDURE — A9270 NON-COVERED ITEM OR SERVICE: HCPCS | Mod: GY | Performed by: PHYSICIAN ASSISTANT

## 2018-08-29 PROCEDURE — 25000132 ZZH RX MED GY IP 250 OP 250 PS 637: Mod: GY | Performed by: PHYSICIAN ASSISTANT

## 2018-08-29 PROCEDURE — 25000132 ZZH RX MED GY IP 250 OP 250 PS 637: Mod: GY | Performed by: SURGERY

## 2018-08-29 PROCEDURE — 12000007 ZZH R&B INTERMEDIATE

## 2018-08-29 PROCEDURE — A9270 NON-COVERED ITEM OR SERVICE: HCPCS | Mod: GY | Performed by: SURGERY

## 2018-08-29 PROCEDURE — 36415 COLL VENOUS BLD VENIPUNCTURE: CPT | Performed by: INTERNAL MEDICINE

## 2018-08-29 PROCEDURE — 25000128 H RX IP 250 OP 636: Performed by: SURGERY

## 2018-08-29 PROCEDURE — 99207 ZZC CDG-MDM COMPONENT: MEETS MODERATE - UP CODED: CPT | Performed by: INTERNAL MEDICINE

## 2018-08-29 RX ORDER — POLYETHYLENE GLYCOL 3350 17 G/17G
17 POWDER, FOR SOLUTION ORAL DAILY
Status: DISCONTINUED | OUTPATIENT
Start: 2018-08-29 | End: 2018-08-30 | Stop reason: HOSPADM

## 2018-08-29 RX ADMIN — PROPRANOLOL HYDROCHLORIDE 80 MG: 80 CAPSULE, EXTENDED RELEASE ORAL at 07:47

## 2018-08-29 RX ADMIN — SENNOSIDES 8.6 MG: 8.6 TABLET, FILM COATED ORAL at 10:09

## 2018-08-29 RX ADMIN — HEPARIN SODIUM 5000 UNITS: 5000 INJECTION, SOLUTION INTRAVENOUS; SUBCUTANEOUS at 16:04

## 2018-08-29 RX ADMIN — IBUPROFEN 600 MG: 600 TABLET ORAL at 23:56

## 2018-08-29 RX ADMIN — IBUPROFEN 600 MG: 600 TABLET ORAL at 15:24

## 2018-08-29 RX ADMIN — ALPRAZOLAM 0.25 MG: 0.25 TABLET ORAL at 07:48

## 2018-08-29 RX ADMIN — ALPRAZOLAM 0.25 MG: 0.25 TABLET ORAL at 20:45

## 2018-08-29 RX ADMIN — ASPIRIN 81 MG 81 MG: 81 TABLET ORAL at 07:47

## 2018-08-29 RX ADMIN — WHITE PETROLATUM: .15; .85 OINTMENT OPHTHALMIC at 20:41

## 2018-08-29 RX ADMIN — ALPRAZOLAM 0.25 MG: 0.25 TABLET ORAL at 16:04

## 2018-08-29 RX ADMIN — BUPROPION HYDROCHLORIDE 300 MG: 150 TABLET, FILM COATED, EXTENDED RELEASE ORAL at 07:47

## 2018-08-29 RX ADMIN — VILAZODONE HYDROCHLORIDE 20 MG: 20 TABLET ORAL at 07:47

## 2018-08-29 RX ADMIN — OMEPRAZOLE 20 MG: 20 CAPSULE, DELAYED RELEASE ORAL at 07:48

## 2018-08-29 RX ADMIN — POLYETHYLENE GLYCOL 3350 17 G: 17 POWDER, FOR SOLUTION ORAL at 10:09

## 2018-08-29 RX ADMIN — HEPARIN SODIUM 5000 UNITS: 5000 INJECTION, SOLUTION INTRAVENOUS; SUBCUTANEOUS at 07:48

## 2018-08-29 RX ADMIN — LEVOTHYROXINE SODIUM 100 MCG: 100 TABLET ORAL at 07:48

## 2018-08-29 ASSESSMENT — ACTIVITIES OF DAILY LIVING (ADL)
ADLS_ACUITY_SCORE: 16

## 2018-08-29 ASSESSMENT — PAIN DESCRIPTION - DESCRIPTORS: DESCRIPTORS: ACHING;CRAMPING

## 2018-08-29 NOTE — PROGRESS NOTES
X-cover    Called for tingling fingers and light headedness with /44.  We are not formally consulted on this patient.  Chart reviewed-s/p colectomy for complicated diverticulitis with pneumoperitoneum.     Ordered  ml bolsus, stat BMP, ionized ca, CBC

## 2018-08-29 NOTE — PLAN OF CARE
Problem: Patient Care Overview  Goal: Plan of Care/Patient Progress Review  Outcome: Improving  Ambulatory Status:  Pt up independent. Up to restroom multiple times.   VS:  Vital signs:  Temp: 98.2  F (36.8  C) Temp src: Oral BP: 125/44   Heart Rate: 50 Resp: 20 SpO2: 95 % O2 Device: None (Room air) /39 and 120/44  Pain:  Stomach pain noted PO ibuprofen x2 and Tylenol x1  Resp: LS clear  GI:  no nausea.  Fair appetite and on low fiber diet.  BS hypoactive.  Passing flatus.  Last BM very small amount, gave PRN stool softener.  :  Voiding well  Skin:  CARMENCITA dressing CDI and midline dressing CDI  Tx:  IV bolus (500mL) given due to lightheaded and tingling noted in fingers  Labs:  Platelet 296  Consults:  Surgery. Hospitalist added this shift due to change of status (lightheaded and tingling)  Disposition:  Home after BM

## 2018-08-29 NOTE — PROGRESS NOTES
"[facility]   General Surgery Progress Note           Assessment and Plan:   Assessment:   -h/o Complicated diverticulitis with pneumoperitoneum, March 2018; colectomy/colostomy  -POD#7 s/p Laparoscopic hand-assisted colostomy reversal, primary repair small parastomal hernia, intraperitoneal drain placement, rigid proctoscopy; Urologist completed: Cystoscopy, placement of bilateral ureteral stents   -Pathology: diverticular disease, inflammation reaction c\w colostomy, one benign node; no malignancy  -Minimal blood per rectum      Plan:   -Diet: low fiber, small meals  -bowel meds:  Senokot.  Add Miralax daily.  -Prophylaxis: heparin, prilosec  -hospitalist following, appreciate input  -awaiting more significant BM.  DC home later today vs tomorrow, will remove drain prior to DC.  Pt declines pain med at DC.         Interval History:   Afebrile.  Comfortable in chair.  Not taking narcotics.  Concerned about weight gain during hospital stay and lack of significant BMs.  Reports single \"pencil eraser sized\" BMs so far.  Passing flatus.  Tolerating low fiber diet without issues.  Up walking frequently, voiding normally.  Reports light headed episode yesterday, treated with NS bolus, denies any episodes of this today.         Physical Exam:   Blood pressure 120/45, pulse 67, temperature 98.2  F (36.8  C), temperature source Oral, resp. rate 24, height 1.626 m (5' 4\"), weight 112.9 kg (249 lb), SpO2 93 %, not currently breastfeeding.    I/O last 3 completed shifts:  In: 1750 [P.O.:1250; I.V.:500]  Out: 15 [Drains:15]    Abdomen:   soft, non-distended, tenderness noted at previous stoma site and drain site.  Incs - steri-strips in place at lateral site.  Gapped closure noted at midline hand-port site and previous ostomy site, no surrounding erythema or fluctuance.  Cleansed open sites with wound spray and swabs.  New dressings placed.     Ok - moderate serous output in bulb, +leakage around tubing.  New " dressing placed.  Plan to remove prior to DC.          Data:       Recent Labs  Lab 08/28/18  1931 08/28/18  0738 08/25/18  0724 08/24/18  0639 08/23/18  0632   WBC 14.0*  --   --  15.4* 20.8*   HGB 10.7*  --   --  12.0 12.1   HCT 33.8*  --   --  37.8 37.9   *  --   --  103* 102*    296 233 255 252     Pathology:   A. Rectal stump:   - Diverticular disease.   - Serosa with fibrosis and foreign body giant cell reaction.   - Margins viable and without abnormalities.   - Reactive lymph node (0/1).   - No evidence of malignancy.     B. Colon, colostomy reversal:   - Changes of colostomy.   - Diverticular disease.   - No evidence of malignancy.    Katerin Bedoya PA-C     Pt seen and examined, agree with above  Could still discharge today if has BM  Otherwise it is reasonable to continue to watch her for the next day given her need to travel a great distance home.

## 2018-08-29 NOTE — CONSULTS
[facility]  Hospitalist Consult Note  Name: Christina Cotto    MRN: [id number]  YOB: 1950    Age: 68 year old  Date of admission: 8/22/2018  Primary care provider: Ike Caldwell     Requesting Physician:  Dr Fuentes  Reason for consult:  Evaluate post op complaints of tingling fingers/light headedness         Assessment and Plan:   Christina Cotto is a 68 year old female with a history of hypothyroidism, depression with anxiety, benign essential tremor, htn, who was admitted for diverticulitis complicated by pneumoperitoneum in 3/2018, admitted on 8/22/18 for elective colectomy for same. Her post op course has been complicated by somewhat prolonged ileus.  This evening I was called to evaluated the patient for bilateral hand tingling and light headedness    1. Tingling fingers and lightheadedness:  Suspect vasodilation, Treated with  ml bolus and subsequently reoslved. No culprit meds    2. Colectomy for complicated diverticulitis-management per primary service    3.  Depression and anxiety:  Continued on home bupropion/vilazodone, prn alprazolam    4.  Hypothyroidism:  Cont replacement    5.  Leukocytosis:  Although improving from admission:  No nc/ea/st/cough/congestion/n/v/d, no significant incisional pain. No f/c/s. Cont to monitor. If discharging soon should have it rechecked in OP setting.     6.  GLORY noted on admission -now resolved    Code status: full  Prophylaxis: subcutaneous heparin per primary service      Thank you for the consultation, we will continue to follow along during the hospitalization. Please page with any questions or concerns.         History of Present Illness:   Christina Cotto is a 68 year old female with a history of hypothyroidism, depression with anxiety, benign essential tremor, htn, who was admitted for diverticulitis complicated by pneumoperitoneum in 3/2018, admitted on 8/22/18 for elective colectomy for same. Her post op course has been  complicated by somewhat prolonged ileus.  This evening I was called to evaluated the patient for bilateral hand tingling and light headedness.    Fairly mild but noticeable, no weakness or other numbness. Some associated light headedness with normal VS.  No fever.  No unusual sleeping position. And otherwise feels well.     No nausea, vomiting, diarrhea. No fevers, chills, diaphoresis. No chest pain, palpitations, dyspnea. Hartman catheter still in place. Tolerating oral intake.but still with no bm.     Resolved with 500 ml bolus. CBC notable for elevated wbc but improved from admit and not of abx.  She also denies any nc/ea/st/neck stiffness/cough/congestion/n/v/dysuria/urgency or frequency, notes some incisional pain that's stable              Past Medical History:     Past Medical History:   Diagnosis Date     Arthritis     generalized     Colostomy in place (H)      Diverticula of colon      PONV (postoperative nausea and vomiting)     slow to wake up     Renal stones      Seizures (H)     40 years ago. cerebral bleed from pre eclampsia     Thyroid disease              Past Surgical History:     Past Surgical History:   Procedure Laterality Date     APPENDECTOMY       ARTHROPLASTY HIP Right 09/2017     ARTHROPLASTY KNEE BILATERAL Bilateral 2007    partial knee repalcements     COLECTOMY WITH COLOSTOMY, COMBINED N/A 3/31/2018    Procedure: COMBINED COLECTOMY WITH COLOSTOMY;;  Surgeon: Rodrigo Claire MD;  Location: [facility] OR     COMBINED CYSTOSCOPY, INSERT STENT URETER(S) Bilateral 8/22/2018    Procedure: COMBINED CYSTOSCOPY, INSERT STENT URETER(S);  Cystoscopy, placement of bilateral ureteral stents;  Surgeon: Kojo Gutierrez MD;  Location:  OR     CYSTOSCOPY  08/2015    kidney stone removal     HYSTERECTOMY TOTAL ABDOMINAL, BILATERAL SALPINGO-OOPHORECTOMY, COMBINED       LAPAROSCOPIC ASSISTED COLOSTOMY TAKEDOWN N/A 8/22/2018    Procedure: LAPAROSCOPIC ASSISTED COLOSTOMY TAKEDOWN;  Laparoscopic  hand-assisted colostomy reversal;  Surgeon: Rodrigo Claire MD;  Location: [facility] OR     LAPAROTOMY EXPLORATORY N/A 3/31/2018    Procedure: LAPAROTOMY EXPLORATORY;  LAPAROTOMY EXPLORATORY, SIGMOID COLECTOMY, COLOSTOMY , Peritoneal Washings;  Surgeon: Rodrigo Claire MD;  Location: [facility] OR     ORTHOPEDIC SURGERY Right 04/2009    thumb joint rebuilt with a tendon transfer     ORTHOPEDIC SURGERY Left 12/2009    thumb joint rebuilt with tendon transfer     ORTHOPEDIC SURGERY Right 2012    right shoulder clean out and lots of other repairs     ORTHOPEDIC SURGERY Right 11/2016    total knee revision     ROTATOR CUFF REPAIR RT/LT Right 2010               Social History:     Social History   Substance Use Topics     Smoking status: Former Smoker     Smokeless tobacco: Never Used     Alcohol use Not on file      Comment: 1-2 glasses of wine per month   Lives alone in her own apt, retired Nursing school instructor          Family History:   Family History   Family history reviewed with patient and is noncontributory.          Allergies:     Allergies   Allergen Reactions     Glipizide Diarrhea     Metformin Diarrhea             Medications:     Prior to Admission medications    Medication Sig Last Dose Taking? Auth Provider   ALPRAZolam (XANAX PO) Take 0.25 mg by mouth 3 times daily  8/21/2018 at Unknown time Yes Reported, Patient   Artificial Tear Ointment (REFRESH P.M.) OINT Apply to eye At Bedtime Past Week at Unknown time Yes Unknown, Entered By History   ascorbic acid 500 MG TABS Take 1,000 mg by mouth daily 8/21/2018 at Unknown time Yes Unknown, Entered By History   aspirin 81 MG chewable tablet Take 81 mg by mouth daily 8/9/2018 Yes Reported, Patient   buPROPion (WELLBUTRIN XL) 300 MG 24 hr tablet Take by mouth every morning 8/21/2018 at Unknown time Yes Unknown, Entered By History   Calcium carb-Vitamin D 500 mg [place]-200 units (OSCAL WITH D;OYSTER SHELL CALCIUM) 500-200 MG-UNIT per tablet Take 1 tablet by  "mouth daily 8/21/2018 at Unknown time Yes Unknown, Entered By History   CYCLOBENZAPRINE HCL PO Take 5 mg by mouth daily as needed  Past Month at Unknown time Yes Reported, Patient   LEVOTHYROXINE SODIUM PO Take 100 mcg by mouth daily  8/21/2018 at Unknown time Yes Reported, Patient   multivitamin, therapeutic (THERA-VIT) TABS tablet Take 1 tablet by mouth daily 8/21/2018 at Unknown time Yes Unknown, Entered By History   Omeprazole (PRILOSEC PO) Take 20 mg by mouth every morning  8/22/2018 at Unknown time Yes Reported, Patient   polyethylene glycol 0.4%- propylene glycol 0.3% (SYSTANE ULTRA) 0.4-0.3 % SOLN ophthalmic solution Place 1 drop into both eyes 4 times daily as needed for dry eyes Past Week at Unknown time Yes Unknown, Entered By History   propranolol (INDERAL LA) 80 MG 24 hr capsule Take 80 mg by mouth daily For tremor 8/22/2018 at Unknown time Yes Unknown, Entered By History   propylene glycol (SYSTANE BALANCE) 0.6 % SOLN ophthalmic solution Place 1 drop into both eyes 4 times daily as needed for dry eyes Past Week at Unknown time Yes Unknown, Entered By History   Spironolactone (ALDACTONE PO) Take 50 mg by mouth daily  8/21/2018 at Unknown time Yes Reported, Patient   Vilazodone HCl (VIIBRYD PO) Take 20 mg by mouth daily  8/22/2018 at Unknown time Yes Reported, Patient       Current hospital administered medication list (MAR) also reviewed.          Review of Systems:   A comprehensive greater than 10 system review of systems was carried out.  Pertinent positives and negatives are noted above.  Otherwise negative for contributory info.            Physical Exam:   Blood pressure 125/44, pulse 67, temperature 98.2  F (36.8  C), temperature source Oral, resp. rate 20, height 1.626 m (5' 4\"), weight 107.5 kg (237 lb), SpO2 95 %, not currently breastfeeding.  Exam:  GENERAL: No apparent distress. Awake, alert, and fully oriented.  HEENT: Normocephalic, atraumatic. Extraocular movements intact.  CARDIOVASCULAR: " Regular rate and rhythm without murmurs or rubs. No S3.  PULMONARY: Clear bilaterally.  ABDOMINAL: Soft, non-tender, non-distended. Bowel sounds normoactive. No hepatosplenomegaly. Trocar insertion sites covered but no surrounding erythema and not warm on exam   EXTREMITIES: No cyanosis or clubbing. No edema.  NEUROLOGICAL: CN 2-12 grossly intact, no focal neurological deficits.  DERMATOLOGICAL: No rash, ulcer, ecchymoses, jaundice.         Data:   Imaging:  Personally reviewed.    EKG/Telemetry:  Personally reviewed.    Labs: Personally reviewed.     Recent Labs  Lab 08/28/18 1931 08/28/18  0738 08/25/18  0724 08/24/18  0639 08/23/18  0632   WBC 14.0*  --   --  15.4* 20.8*   HGB 10.7*  --   --  12.0 12.1   HCT 33.8*  --   --  37.8 37.9   *  --   --  103* 102*    296 233 255 252       Recent Labs  Lab 08/28/18 1931 08/24/18  0639 08/23/18  0632    142 138   POTASSIUM 3.5 3.9 4.3   CHLORIDE 106 109 105   CO2 30 26 23   ANIONGAP 6 7 10   GLC 79 88 133*   BUN 6* 13 16   CR 0.97 1.29* 1.37*   GFRESTIMATED 57* 41* 38*   GFRESTBLACK 69 49* 46*   JESSICA 8.1* 8.3* 8.3*         Anastacia Lugo MD  [facility]

## 2018-08-29 NOTE — DISCHARGE INSTRUCTIONS
HOME CARE FOLLOWING ABDOMINAL SURGERY  GEOFF Jolly, KARLA Shin, NARDA Shelley    INCISIONAL CARE:  Replace the bandage over your incision (or incisions) until all drainage stops, or if more comfortable to have in place.  If present, leave the steri-strips (white paper tapes) in place for 14 days after surgery.  If you have staples in your incision at the time of discharge, they will be removed at your follow-up appointment.  If Dermabond (a type of skin glue) is present, leave in place until it wears/flakes off.     BATHING:  Avoid baths for 1 week after surgery.  Showers are okay.  You may wash your hair at any time.  Gently pat your incision dry after bathing.    ACTIVITY:  Light Activity -- you may immediately be up and about as tolerated.  Driving -- you may drive when comfortable and off narcotic pain medications.  Light Work -- resume when comfortable off pain medications.  (If you can drive, you probably can work.)  Strenuous Work/Activity -- limit lifting to 20 pounds for 4 weeks.  Then, progressively increase with time.  Active Sports (running, biking, etc.) -- cautiously resume after 6 weeks.    DISCOMFORT:  Use pain medications as prescribed by your surgeon.  Take the pain medication with some food, when possible, to minimize side effects.  Expect gradual improvement.    DIET:  Return to diet you were on before surgery, unless you are given specific diet instructions.  Drink plenty of fluids.  While taking pain medications, increase dietary fiber or add a fiber supplementation like Metamucil or Citrucel to help prevent constipation - a possible side effect of pain medications.    NAUSEA:  If nauseated from the anesthetic/pain meds; rest in bed, get up cautiously with assistance, and drink clear liquids (juice, tea, broth).    RETURN APPOINTMENT:  Schedule a follow-up visit 2-3 weeks after discharge from the hospital.  Office Phone:  [phone]     CONTACT US IF THE FOLLOWING  DEVELOPS:   1. A fever that is above 101     2. If there is a large amount of drainage, bleeding, or swelling.   3. Severe pain that is not relieved by your prescription.   4. Drainage that is thick, cloudy, yellow, green or white.   5. Any other questions not answered by  Frequently Asked Questions  sheet.      FREQUENTLY ASKED QUESTIONS:    Q:  How should my incision look?    A:  Normally your incision will appear slightly swollen with light redness directly along the incision itself as it heals.  It may feel like a bump or ridge as the healing/scarring happens, and over time (3-4 months) this bump or ridge feeling should slowly go away.  In general, clear or pink watery drainage can be normal at first as your incision heals, but should decrease over time.    Q:  How do I know if my incision is infected?  A:  Look at your incision for signs of infection, like redness around the incision spreading to surrounding skin, or drainage of cloudy or foul-smelling drainage.  If you feel warm, check your temperature to see if you are running a fever.    **If any of these things occur, please notify the nurse at our office.  We may need you to come into the office for an incision check.      Q:  How do I take care of my incision?  A:  If you have a dressing in place - Starting the day after surgery, replace the dressing 1-2 times a day until there is no further drainage from the incision.  At that time, a dressing is no longer needed.  Try to minimize tape on the skin if irritation is occurring at the tape sites.  If you have significant irritation from tape on the skin, please call the office to discuss other method of dressing your incision.    Small pieces of tape called  steri-strips  may be present directly overlying your incision; these may be removed 10 days after surgery unless otherwise specified by your surgeon.  If these tapes start to loosen at the ends, you may trim them back until they fall off or are removed.     A:  If you had  Dermabond  tissue glue used as a dressing (this causes your incision to look shiny with a clear covering over it) - This type of dressing wears off with time and does not require more dressings over the top unless it is draining around the glue as it wears off.  Do not apply ointments or lotions over the incisions until the glue has completely worn off.    Q:  There is a piece of tape or a sticky  lead  still on my skin.  Can I remove this?  A:  Sometimes the sticky  leads  used for monitoring during surgery or for evaluation in the emergency department are not all removed while you are in the hospital.  These sometimes have a tab or metal dot on them.  You can easily remove these on your own, like taking off a band-aid.  If there is a gel substance under the  lead , simply wipe/clean it off with a washcloth or paper towel.      Q:  What can I do to minimize constipation (very hard stools, or lack of stools)?  A:  Stay well hydrated.  Increase your dietary fiber intake or take a fiber supplement -with plenty of water.  Walk around frequently.  You may consider an over-the-counter stool-softener.  Your Pharmacist can assist you with choosing one that is stocked at your pharmacy.  Constipation is also one of the most common side effects of pain medication.  If you are using pain medication, be pro-active and try to PREVENT problems with constipation by taking the steps above BEFORE constipation becomes a problem.    Q:  What do I do if I need more pain medications?  A:  Call the office to receive refills.  Be aware that certain pain meds cannot be called into a pharmacy and actually require a paper prescription.  A change may be made in your pain med as you progress thru your recovery period or if you have side effects to certain meds.    --Pain meds are NOT refilled after 5pm on weekdays, and NOT AT ALL on the weekends, so please look ahead to prevent problems.      Q:  Why am I having a hard time  sleeping now that I am at home?  A:  Many medications you receive while you are in the hospital can impact your sleep for a number of days after your surgery/hospitalization.  Decreased level of activity and naps during the day may also make sleeping at night difficult.  Try to minimize day-time naps, and get up frequently during the day to walk around your home during your recovery time.  Sleep aides may be of some help, but are not recommended for long-term use.      Q:  I am having some back discomfort.  What should I do?  A:  This may be related to certain positioning that was required for your surgery, extended periods of time in bed, or other changes in your overall activity level.  You may try ice, heat, acetaminophen, or ibuprofen to treat this temporarily.  Note that many pain medications have acetaminophen in them and would state this on the prescription bottle.  Be sure not to exceed the maximum of 4000mg per day of acetaminophen.     **If the pain you are having does not resolve, is severe, or is a flare of back pain you have had on other occasions prior to surgery, please contact your primary physician for further recommendations or for an appointment to be examined at their office.    Q:  Why am I having headaches?  A:  Headaches can be caused by many things:  caffeine withdrawal, use of pain meds, dehydration, high blood pressure, lack of sleep, over-activity/exhaustion, flare-up of usual migraine headaches.  If you feel this is related to muscle tension (a band-like feeling around the head, or a pressure at the low-back of the head) you may try ice or heat to this area.  You may need to drink more fluids (try electrolyte drink like Gatorade), rest, or take your usual migraine medications.   **If your headaches do not resolve, worsen, are accompanied by other symptoms, or if your blood pressure is high, please call your primary physician for recommendation and/or examination.    Q:  I am unable to  urinate.  What do I do?  A:  A small percentage of people can have difficulty urinating initially after surgery.  This includes being able to urinate only a very small amount at a time and feeling discomfort or pressure in the very low abdomen.  This is called  urinary retention , and is actually an urgent situation.  Proceed to your nearest Emergency department for evaluation (not an Urgent Care Center).  Sometimes the bladder does not work correctly after certain medications you receive during surgery, or related to certain procedures.  You may need to have a catheter placed until your bladder recovers.  When planning to go to an Emergency department, it may help to call the ER to let them know you are coming in for this problem after a surgery.  This may help you get in quicker to be evaluated.  **If you have symptoms of a urinary tract infection, please contact your primary physician for the proper evaluation and treatment.          If you have other questions, please call the office Monday thru Friday between 8am and 5pm to discuss with the nurse or physician assistant.  #[phone]    There is a surgeon ON CALL on weekday evenings and over the weekend in case of urgent need only, and may be contacted at the same number.    If you are having an emergency, call 911 or proceed to your nearest emergency department.

## 2018-08-29 NOTE — PROGRESS NOTES
"[facility]  Hospitalist Progress Note  Bartolo Briones, DO 08/29/2018    Reason for Stay (Diagnosis): colostomy takedown.         Assessment and Plan:      Summary of Stay: Christina Cotto is a 68 year old female admitted on 8/22/2018 with colostomy takedown.    Problem List:   1. Colostomy takedown.  Pain medications as needed.  Use incentive spirometry.  2. Anemia.  Post op.    3. Depression.  Continue vilazodone and buproprion.  4. Tremor.  Propranolol.  5. GERD.  Omeprazole.  6. Hypothyroidism.  Synthroid.  DVT Prophylaxis: Heparin SQ  Code Status: Full Code  Discharge Dispo: Home  Estimated Disch Date / # of Days until Disch: per surgery        Interval History (Subjective):      Occasional abdominal pain.  No CP, SOB, F/C, N/V, or diarrhea.                  Physical Exam:      Last Vital Signs:  /40 (BP Location: Left arm)  Pulse 67  Temp 97.5  F (36.4  C) (Oral)  Resp 18  Ht 1.626 m (5' 4\")  Wt 112.9 kg (249 lb)  SpO2 97%  BMI 42.74 kg/m2    Gen:  NAD, A&Ox3.  Eyes:  PERRL, sclera anicteric.  OP:  MMM, no lesions.  Neck:  Supple.  CV:  Regular, no murmurs.  Lung:  CTA b/l, normal effort.  Ab:  +BS, soft.  Skin:  Warm, dry to touch.  No rash.  Ext:  No pitting edema LE b/l.           Medications:      All current medications were reviewed with changes reflected in problem list.         Data:      All new lab and imaging data was reviewed.   Labs:    Recent Labs  Lab 08/28/18 1931      POTASSIUM 3.5   CHLORIDE 106   CO2 30   ANIONGAP 6   GLC 79   BUN 6*   CR 0.97   GFRESTIMATED 57*   GFRESTBLACK 69   JESSICA 8.1*       Recent Labs  Lab 08/28/18 1931   WBC 14.0*   HGB 10.7*   HCT 33.8*   *         Imaging:   No results found for this or any previous visit (from the past 24 hour(s)).    "

## 2018-08-29 NOTE — PLAN OF CARE
Problem: Patient Care Overview  Goal: Plan of Care/Patient Progress Review  Outcome: Improving  POD7 colostomy TD. Passing gas, BS active, denies nausea. Awaiting formed BM. Tolerating low fiber diet. Midline and CARMENCITA dressings CDI. CARMENCITA stripped x2. Reports mild abdominal pain around incisions but declines need for pain medication. /37 - encouraged oral hydration. VSS and afebrile Up independently in room. Pt hopeful to discharge home today.

## 2018-08-29 NOTE — PLAN OF CARE
Problem: Pain, Acute (Adult)  Goal: Identify Related Risk Factors and Signs and Symptoms  Related risk factors and signs and symptoms are identified upon initiation of Human [employer] Clinical Practice Guideline (CPG).   Outcome: Improving  Ambulatory Status:  Pt up ind.  Orientation: a/o  VS:  VSS  Pain:  abd pain. Declines any pain medication   Resp: LS clear.   GI:  denies nausea.  Poly. Low fiber diet. +BS.  Passing flatus.  Last BM 8/28(marble size).  :  Voiding without difficulty   Skin:  Incision: dressing CDI. CARMENCITA dressing changed by surgery this morning.   Consults:  Surgery   Disposition:  Possible dc this afternoon and or tomorrow morning.

## 2018-08-29 NOTE — PROVIDER NOTIFICATION
Called MD Ozuna about pt having tingling in finger tips and feeling light headed. /44 and P 55. He gave me a verbal order to place hospitalist consult. I paged on call MD to let them know about pt's concern.

## 2018-08-30 VITALS
SYSTOLIC BLOOD PRESSURE: 123 MMHG | RESPIRATION RATE: 18 BRPM | HEIGHT: 64 IN | TEMPERATURE: 98.2 F | BODY MASS INDEX: 42.51 KG/M2 | OXYGEN SATURATION: 95 % | WEIGHT: 249 LBS | HEART RATE: 56 BPM | DIASTOLIC BLOOD PRESSURE: 37 MMHG

## 2018-08-30 PROCEDURE — 25000132 ZZH RX MED GY IP 250 OP 250 PS 637: Mod: GY | Performed by: PHYSICIAN ASSISTANT

## 2018-08-30 PROCEDURE — 25000132 ZZH RX MED GY IP 250 OP 250 PS 637: Mod: GY | Performed by: SURGERY

## 2018-08-30 PROCEDURE — 99239 HOSP IP/OBS DSCHRG MGMT >30: CPT | Performed by: INTERNAL MEDICINE

## 2018-08-30 PROCEDURE — A9270 NON-COVERED ITEM OR SERVICE: HCPCS | Mod: GY | Performed by: SURGERY

## 2018-08-30 PROCEDURE — A9270 NON-COVERED ITEM OR SERVICE: HCPCS | Mod: GY | Performed by: PHYSICIAN ASSISTANT

## 2018-08-30 RX ADMIN — PROPRANOLOL HYDROCHLORIDE 80 MG: 80 CAPSULE, EXTENDED RELEASE ORAL at 08:40

## 2018-08-30 RX ADMIN — OMEPRAZOLE 20 MG: 20 CAPSULE, DELAYED RELEASE ORAL at 08:40

## 2018-08-30 RX ADMIN — ALPRAZOLAM 0.25 MG: 0.25 TABLET ORAL at 08:40

## 2018-08-30 RX ADMIN — ASPIRIN 81 MG 81 MG: 81 TABLET ORAL at 08:40

## 2018-08-30 RX ADMIN — LEVOTHYROXINE SODIUM 100 MCG: 100 TABLET ORAL at 08:40

## 2018-08-30 RX ADMIN — VILAZODONE HYDROCHLORIDE 20 MG: 20 TABLET ORAL at 08:40

## 2018-08-30 RX ADMIN — BUPROPION HYDROCHLORIDE 300 MG: 150 TABLET, FILM COATED, EXTENDED RELEASE ORAL at 08:39

## 2018-08-30 RX ADMIN — POLYETHYLENE GLYCOL 3350 17 G: 17 POWDER, FOR SOLUTION ORAL at 08:39

## 2018-08-30 ASSESSMENT — ACTIVITIES OF DAILY LIVING (ADL)
ADLS_ACUITY_SCORE: 16

## 2018-08-30 ASSESSMENT — PAIN DESCRIPTION - DESCRIPTORS: DESCRIPTORS: ACHING

## 2018-08-30 NOTE — PLAN OF CARE
Problem: Patient Care Overview  Goal: Plan of Care/Patient Progress Review  Outcome: Improving  Pt slept well. Up to BR with +BM x3, firm, moderate size, no blood. No pain meds. IV and CARMENCITA d/c'd today. Anticipate home after MD rounds.

## 2018-08-30 NOTE — PROGRESS NOTES
Surgery-[place]  POD#8 s/p lap hand assisted colostomy reversal  Doing well, had three solid stools, drain out, continues to tolerated diet well  No fevers, VSS  NAD, comfortable  Abd benign, dressings with small amount of spotting as anticipated  Doing well, appropriate for discharge today  Pt has declined oral narcotics  Instructions on chart

## 2018-08-30 NOTE — PLAN OF CARE
Problem: Patient Care Overview  Goal: Plan of Care/Patient Progress Review  Outcome: Adequate for Discharge Date Met: 08/30/18  Pain well controlled on current oral regiment, had x3 stools overnight, tolerating low fiber diet, some drainage from both incisions on abd and also from previous CARMENCITA site, dressings changed after her shower, up independently in room, all discharge instructions and follow up reviewed with patient including management of incisions and dressing changes and patient verbalizes understanding of these instructions.

## 2018-08-31 ENCOUNTER — TELEPHONE (OUTPATIENT)
Dept: SURGERY | Facility: CLINIC | Age: 68
End: 2018-08-31

## 2018-08-31 NOTE — DISCHARGE SUMMARY
Physician Discharge Summary     Name: Christina Cotto    MRN: [id number]     YOB: 1950    Age: 68 year old                                                 Primary care provider: Ike Caldwell      Admit date:  8/22/2018      Discharge date and time: 8/30/2018 11:13 AM       Discharge Physician:  Sin Peralta          Primary Discharge Diagnosis      Patient is 68-year-old female admitted by general surgery team for colostomy takedown.  Patient has history of perforated diverticulitis requiring emergency surgery in the past who was electively admitted for a colostomy takedown.  Please review complete discharge summary by surgery.  Hospitalist service was consulted for postoperative ileus as well as bilateral arm tingling and lightheadedness which resolved    #1.  Colostomy takedown  #2.  Postoperative blood loss anemia  #3.  Acute kidney injury    Secondary Diagnosis /chronic medical conditions:    Past Medical History:   Diagnosis Date     Arthritis     generalized     Colostomy in place (H)      Diverticula of colon      PONV (postoperative nausea and vomiting)     slow to wake up     Renal stones      Seizures (H)     40 years ago. cerebral bleed from pre eclampsia     Thyroid disease        Past Surgical History:    Past Surgical History:   Procedure Laterality Date     APPENDECTOMY       ARTHROPLASTY HIP Right 09/2017     ARTHROPLASTY KNEE BILATERAL Bilateral 2007    partial knee repalcements     COLECTOMY WITH COLOSTOMY, COMBINED N/A 3/31/2018    Procedure: COMBINED COLECTOMY WITH COLOSTOMY;;  Surgeon: Rodrigo Claire MD;  Location: [facility] OR     COMBINED CYSTOSCOPY, INSERT STENT URETER(S) Bilateral 8/22/2018    Procedure: COMBINED CYSTOSCOPY, INSERT STENT URETER(S);  Cystoscopy, placement of bilateral ureteral stents;  Surgeon: Kojo Gutierrez MD;  Location:  OR     CYSTOSCOPY  08/2015    kidney stone removal     HYSTERECTOMY TOTAL ABDOMINAL, BILATERAL  "SALPINGO-OOPHORECTOMY, COMBINED       LAPAROSCOPIC ASSISTED COLOSTOMY TAKEDOWN N/A 8/22/2018    Procedure: LAPAROSCOPIC ASSISTED COLOSTOMY TAKEDOWN;  Laparoscopic hand-assisted colostomy reversal;  Surgeon: Rodrigo Claire MD;  Location: [facility] OR     LAPAROTOMY EXPLORATORY N/A 3/31/2018    Procedure: LAPAROTOMY EXPLORATORY;  LAPAROTOMY EXPLORATORY, SIGMOID COLECTOMY, COLOSTOMY , Peritoneal Washings;  Surgeon: Rodrigo Claire MD;  Location: [facility] OR     ORTHOPEDIC SURGERY Right 04/2009    thumb joint rebuilt with a tendon transfer     ORTHOPEDIC SURGERY Left 12/2009    thumb joint rebuilt with tendon transfer     ORTHOPEDIC SURGERY Right 2012    right shoulder clean out and lots of other repairs     ORTHOPEDIC SURGERY Right 11/2016    total knee revision     ROTATOR CUFF REPAIR RT/LT Right 2010                   Brief Summary of Hospital stay :       Please refer to  Admission H&P note for full details of patient presentation.          Reason for Hospitalization(C/C,HPI and brief patient summary):      Colostomy takedown    Significant findings(Primary diagnosis )Procedures and treatment provided(Hospital course ,consults procedures):Please see bellow for details  Christina Cotto is a 68 year old female with a history of hypothyroidism, depression with anxiety, benign essential tremor, htn, who was admitted for diverticulitis complicated by pneumoperitoneum in 3/2018, admitted on 8/22/18 for elective colectomy for same. Her post op course has been complicated by somewhat prolonged ileus.       Consultations during hospital stay:    HOSPITALIST IP CONSULT      Patient discharge Condition:     stable    BP (!) 123/37 (BP Location: Left arm)  Pulse 56  Temp 98.2  F (36.8  C) (Oral)  Resp 18  Ht 1.626 m (5' 4\")  Wt 112.9 kg (249 lb)  SpO2 95%  BMI 42.74 kg/m2     # Discharge Pain Plan:   - Patient currently has NO PAIN and is not being prescribed pain medications on discharge.         Discharge " Instructions:       Patient/family instructions:    Written discharge instruction given to patient/family.       Review of your medicines      CONTINUE these medicines which have NOT CHANGED       Dose / Directions    ALDACTONE PO        Dose:  50 mg   Take 50 mg by mouth daily   Refills:  0       ascorbic acid 500 MG Tabs        Dose:  1000 mg   Take 1,000 mg by mouth daily   Refills:  0       aspirin 81 MG chewable tablet        Dose:  81 mg   Take 81 mg by mouth daily   Refills:  0       buPROPion 300 MG 24 hr tablet   Commonly known as:  WELLBUTRIN XL        Take by mouth every morning   Refills:  0       calcium carbonate 500 mg-vitamin D 200 units 500-200 MG-UNIT per tablet   Commonly known as:  OSCAL with D;OYSTER SHELL CALCIUM        Dose:  1 tablet   Take 1 tablet by mouth daily   Refills:  0       CYCLOBENZAPRINE HCL PO        Dose:  5 mg   Take 5 mg by mouth daily as needed   Refills:  0       LEVOTHYROXINE SODIUM PO        Dose:  100 mcg   Take 100 mcg by mouth daily   Refills:  0       multivitamin, therapeutic Tabs tablet        Dose:  1 tablet   Take 1 tablet by mouth daily   Refills:  0       polyethylene glycol 0.4%- propylene glycol 0.3% 0.4-0.3 % Soln ophthalmic solution   Commonly known as:  SYSTANE ULTRA        Dose:  1 drop   Place 1 drop into both eyes 4 times daily as needed for dry eyes   Refills:  0       PRILOSEC PO        Dose:  20 mg   Take 20 mg by mouth every morning   Refills:  0       propranolol 80 MG 24 hr capsule   Commonly known as:  INDERAL LA        Dose:  80 mg   Take 80 mg by mouth daily For tremor   Refills:  0       REFRESH P.M. Oint        Apply to eye At Bedtime   Refills:  0       SYSTANE BALANCE 0.6 % Soln ophthalmic solution   Generic drug:  propylene glycol        Dose:  1 drop   Place 1 drop into both eyes 4 times daily as needed for dry eyes   Refills:  0       VIIBRYD PO        Dose:  20 mg   Take 20 mg by mouth daily   Refills:  0       XANAX PO        Dose:  0.25  mg   Take 0.25 mg by mouth 3 times daily   Refills:  0              Discharge diet: Per surgery recommendation      Discharge activity:Activity as tolerated      Discharge follow-up:    Follow up with primary care provider in 7 days or earlier if symptoms return or gets worse.    Follow up with consultant as instructed         Other instructions:    We discussed with Patient/family about detail discharge instructions as well as discharge medications above including potential risks,side effects and benefits.Patient/family understood benefits and potential serious side effects of taking these medications and need to follow up with PCP if the patient develops complications.  Patient is also advised to see a doctor immediately for severe symptoms.          Major procedure performed/  Significant Diagnostic Studies:           Results for orders placed or performed during the hospital encounter of 08/10/18   XR Colon Barium    Narrative    COLON BARIUM   8/10/2018 11:25 AM     HISTORY:  Colostomy status (H).    COMPARISON: CT 6/5/2018    TECHNIQUE: Barium contrast introduced via the rectum through a tube  and then through the patient's left lower quadrant ostomy. 12 spot  images recorded. Fluoroscopy time 0.9 minutes. Skin entrance dose 18.9  mGy.    FINDINGS: Rectal stump is patent. No extravasation. A few diverticula  are noted.    Left lower quadrant ostomy is patent. Contrast passes readily from the  ostomy to the cecum. Diffuse colonic diverticulosis is noted. No  stricture or mass demonstrated. There was no reflux of contrast into  the terminal ileum.      Impression    IMPRESSION: The colon is patent from the cecum to the left lower  quadrant ostomy. The rectal stump is also patent. No extravasation.    JOSÉ ANTONIO YOU MD         Recent Labs  Lab 08/28/18  1931 08/28/18  0738 08/25/18  0724   WBC 14.0*  --   --    HGB 10.7*  --   --    HCT 33.8*  --   --    *  --   --     296 233     No results for  input(s): CULT in the last 168 hours.    Recent Labs  Lab 08/28/18 1931      POTASSIUM 3.5   CHLORIDE 106   CO2 30   ANIONGAP 6   GLC 79   BUN 6*   CR 0.97   GFRESTIMATED 57*   GFRESTBLACK 69   JESSICA 8.1*         Recent Labs  Lab 08/28/18 1931   GLC 79           No results for input(s): INR in the last 168 hours.    Incidental findings that need follow up:     Pending Results:       Unresulted Labs Ordered in the Past 30 Days of this Admission     No orders found from 6/23/2018 to 8/23/2018.             Patient Allergies:       Allergies   Allergen Reactions     Glipizide Diarrhea     Metformin Diarrhea         Disposition:   home       I saw and evaluated the patient on day of discharge and  discharge instructions reviewed  and  all the patient's questions and concerns addressed.Over 30 minutes spent on discharge and coordination of discharge process for this patient.           Disclaimer: This note consists of symbols derived from keyboarding, dictation and/or voice recognition software. As a result, there may be errors in the script that have gone undetected. Please consider this when interpreting information found in this chart

## 2018-08-31 NOTE — TELEPHONE ENCOUNTER
"[facility]   Postoperative Phone Call    Procedure Date:  August/22  Surgeon:  Dr. Newman  Procedure:  Laparoscopic hand-assisted colostomy reversal    Pt concerns:  Got home from the hospital yesterday after having 3 formed BMs and tolerating diet.  Then last evening have 10-12 episodes of explosive yellow/mucous stools with fecal urgency.  Denies blood in the stool, no nausea.  Slightly better today with decreased frequency, but still yellow/mucous and explosive.  No abdominal cramping with this.  Feels \"starving\" today and able to get fluids in and crackers.    -Only a sangeetha-op ABX dose with recent surgery.  Was on pretty significant bowel program in last few days of admission to promote bowels.      Recommendations reviewed:  May be related to bowel program but should probably check CDiff test just to be sure.  Pt lives in [place], so recommended she discuss with her local PCP to see if this could be ordered there.  In meantime, recommended hydration, increase of diet as tolerated.  May simply improve over the following days if related to bowel program effects from prior to discharge home.    Margarita Rosenthal PA-C    "

## 2018-09-05 ENCOUNTER — TELEPHONE (OUTPATIENT)
Dept: SURGERY | Facility: CLINIC | Age: 68
End: 2018-09-05

## 2018-09-05 NOTE — TELEPHONE ENCOUNTER
Pt calling to report that stools remain the same.  Loose watery stools approximately 6-10 times per day. Stools are yellow in color with mucous present at times, small amounts each time - approximately 1-2 tablespoons.    Fecal urgency remains, as patient continues with difficulty getting to the bathroom in time.  Wondering if there is anything she should do or can she take anti-diarrheal. She reports that she did arrange C-diff test through her PCP and results are currently pending.  Expects results late today or tomorrow.      Await results of C-diff at this time. Patient will call our office to inform us of results.

## 2018-09-05 NOTE — TELEPHONE ENCOUNTER
Name of caller: Patient    Reason for Call:  Pt has extreme urgency to have BM. Yellow with semi solid , semi liquid, mucus and small amounts.     Surgeon:  Dr. Newman    Recent Surgery:  Yes.    If yes, when & what type:    Laparoscopic hand-assisted colostomy reversal 8/22/18     Best phone number to reach pt at is: [phone] (H)  Ok to leave a message with medical info? Yes.    Pharmacy preferred (if calling for a refill): N/A

## 2018-09-06 ENCOUNTER — TELEPHONE (OUTPATIENT)
Dept: SURGERY | Facility: PHYSICIAN GROUP | Age: 68
End: 2018-09-06

## 2018-09-06 NOTE — TELEPHONE ENCOUNTER
[facility] Telephone Note: 9/6/18  Patient calling from [place]. She is tells me that her C. Diff testing was negative. She still has persistent diarrhea. I spoke with Dr. Newman about this. We will try Imodium, pro-biotics and increasing fiber in her diet. She is in agreement with this plan. If she does not improve she will call early next week.    Juventino Cox PA-C

## 2018-09-08 ENCOUNTER — TRANSFERRED RECORDS (OUTPATIENT)
Dept: SURGERY | Facility: CLINIC | Age: 68
End: 2018-09-08

## 2020-03-10 ENCOUNTER — HEALTH MAINTENANCE LETTER (OUTPATIENT)
Age: 70
End: 2020-03-10

## 2020-12-20 ENCOUNTER — HEALTH MAINTENANCE LETTER (OUTPATIENT)
Age: 70
End: 2020-12-20

## 2021-04-24 ENCOUNTER — HEALTH MAINTENANCE LETTER (OUTPATIENT)
Age: 71
End: 2021-04-24

## 2021-10-03 ENCOUNTER — HEALTH MAINTENANCE LETTER (OUTPATIENT)
Age: 71
End: 2021-10-03

## 2022-03-20 ENCOUNTER — HEALTH MAINTENANCE LETTER (OUTPATIENT)
Age: 72
End: 2022-03-20

## 2022-05-15 ENCOUNTER — HEALTH MAINTENANCE LETTER (OUTPATIENT)
Age: 72
End: 2022-05-15

## 2022-09-11 ENCOUNTER — HEALTH MAINTENANCE LETTER (OUTPATIENT)
Age: 72
End: 2022-09-11

## 2023-06-03 ENCOUNTER — HEALTH MAINTENANCE LETTER (OUTPATIENT)
Age: 73
End: 2023-06-03

## (undated) DEVICE — GLOVE PROTEXIS POWDER FREE 7.5 ORTHOPEDIC 2D73ET75

## (undated) DEVICE — SU VICRYL 2-0 TIE 12X18" J905T

## (undated) DEVICE — LINEN TOWEL PACK X10 5473

## (undated) DEVICE — PACK SET-UP STD 9102

## (undated) DEVICE — NDL 22GA 1.5"

## (undated) DEVICE — SPONGE LAP 18X18" X8435

## (undated) DEVICE — DRSG TELFA 3X8" 1238

## (undated) DEVICE — STPL CONTOUR CUT CVD GREEN CS40G

## (undated) DEVICE — NDL 18GA 1.5" 305196

## (undated) DEVICE — PACK MAJOR SBA15MAFSI

## (undated) DEVICE — LINEN HALF SHEET 5512

## (undated) DEVICE — STPL CIRCULAR ENDOPATH 25MM CVD ECS25A

## (undated) DEVICE — Device

## (undated) DEVICE — DRSG GAUZE 4X4" TRAY

## (undated) DEVICE — DRAPE LEGGINGS 8421

## (undated) DEVICE — GLOVE PROTEXIS BLUE W/NEU-THERA 8.0  2D73EB80

## (undated) DEVICE — SU VICRYL 4-0 PS-2 18" UND J496H

## (undated) DEVICE — APPLICATOR COTTON TIP 6"X2 STERILE LF 6012

## (undated) DEVICE — SOL WATER IRRIG 3000ML BAG 2B7117

## (undated) DEVICE — LINEN TOWEL PACK X5 5464

## (undated) DEVICE — PAD CHUX UNDERPAD 30X36" P3036C

## (undated) DEVICE — SU PDS II 1 CTX 36" Z371T

## (undated) DEVICE — ESU CORD MONOPOLAR 10'  E0510

## (undated) DEVICE — GOWN LG DISP 9515

## (undated) DEVICE — BARRIER SEPRAFILM 3X5" X6 SHEETS 5086-02

## (undated) DEVICE — GLOVE PROTEXIS POWDER FREE 8.0 ORTHOPEDIC 2D73ET80

## (undated) DEVICE — SUCTION CANISTER MEDIVAC LINER 3000ML W/LID 65651-530

## (undated) DEVICE — LINEN ORTHO ACL PACK 5447

## (undated) DEVICE — GOWN XLG DISP 9545

## (undated) DEVICE — ENDO GELPORT 100/120MM C8XX2

## (undated) DEVICE — STPL 100 X 3.8MM GIA10038S

## (undated) DEVICE — GLOVE PROTEXIS BLUE W/NEU-THERA 7.0  2D73EB70

## (undated) DEVICE — SU VICRYL 2-0 SH 27" J317H

## (undated) DEVICE — ENDO TROCAR FIRST ENTRY KII FIOS Z-THRD 05X100MM CTF03

## (undated) DEVICE — SU VICRYL 3-0 PS-2 27" UND J427H

## (undated) DEVICE — ESU LIGASURE LAPAROSCOPIC BLUNT TIP SEALER 5MMX37CM LF1837

## (undated) DEVICE — SU ETHILON 2-0 PS 18" 585H

## (undated) DEVICE — ENDO TROCAR SLEEVE KII Z-THREADED 05X100MM CTS02

## (undated) DEVICE — GLOVE PROTEXIS W/NEU-THERA 7.0  2D73TE70

## (undated) DEVICE — LINEN POUCH DBL 5427

## (undated) DEVICE — ESU LIGASURE ATLAS 20CM  LS1020

## (undated) DEVICE — SU VICRYL 0 UR-6 27" J603H

## (undated) DEVICE — SU PDS II 0 CT 36" Z358T

## (undated) DEVICE — GLOVE PROTEXIS BLUE W/NEU-THERA 7.5  2D73EB75

## (undated) DEVICE — LINEN DRAPE 54X72" 5467

## (undated) DEVICE — SU SILK 3-0 SH 30" K832H

## (undated) DEVICE — SOL NACL 0.9% IRRIG 1000ML BOTTLE 2F7124

## (undated) DEVICE — BLADE KNIFE SURG 11 371111

## (undated) DEVICE — ESU ELEC BLADE 2.75" COATED/INSULATED E1455

## (undated) DEVICE — DRAIN JACKSON PRATT RESERVOIR 100ML SU130-1305

## (undated) DEVICE — OSTOMY BAG COLOSTOMY ACTIVE LIFE 1 PIECE 8651

## (undated) DEVICE — ENDO TROCAR FIRST ENTRY KII FIOS ADV FIX 05X100MM CFF03

## (undated) DEVICE — PREP CHLORAPREP 26ML TINTED ORANGE  260815

## (undated) DEVICE — DRSG KERLIX FLUFFS X5

## (undated) DEVICE — DRSG TEGADERM 4X4 3/4" 1626W

## (undated) DEVICE — STPL ENDO RELOAD 45MM MEDIUM THICK PURPLE EGIA45AMT

## (undated) DEVICE — SYR 20ML LL W/O NDL 302830

## (undated) DEVICE — GLOVE PROTEXIS BLUE W/NEU-THERA 6.5  2D73EB65

## (undated) DEVICE — CATH TRAY FOLEY SURESTEP 16FR DRAIN BAG STATOCK A899916

## (undated) DEVICE — BAG CLEAR TRASH 1.3M 39X33" P4040C

## (undated) DEVICE — SUCTION MANIFOLD NEPTUNE 2 SYS 4 PORT 0702-020-000

## (undated) DEVICE — DRAPE LAP W/ARMBOARD 29410

## (undated) DEVICE — TUBING IRRIG TUR Y TYPE 96" LF 6543-01

## (undated) DEVICE — SU PDS II 0 CT-2 27" Z334H

## (undated) DEVICE — GLOVE PROTEXIS POWDER FREE SMT 6.5  2D72PT65X

## (undated) DEVICE — SU VICRYL 3-0 SH CR 8X18" J774

## (undated) DEVICE — DRSG GAUZE 4X4" 8044

## (undated) DEVICE — PREP TECHNI-CARE CHLOROXYLENOL 3% 4OZ BOTTLE C222-4ZWO

## (undated) DEVICE — LINEN FULL SHEET 5511

## (undated) DEVICE — DRAPE LAVH/LAPAROSCOPY W/POUCH 29474

## (undated) DEVICE — SYSTEM CLEARIFY VISUALIZATION 21-345

## (undated) DEVICE — PREP POVIDONE IODINE SOLUTION 10% 4OZ

## (undated) DEVICE — DRAPE IOBAN C-SECTION W/POUCH 30X35" 6657

## (undated) DEVICE — CATH URETERAL FLEX TIP TIGERTAIL 06FRX70CM 139006

## (undated) DEVICE — ENDO TROCAR SLEEVE KII ADV FIXATION 05X100MM CFS02

## (undated) DEVICE — PREP SKIN SCRUB TRAY 4461A

## (undated) DEVICE — GLOVE PROTEXIS POWDER FREE SMT 7.5  2D72PT75X

## (undated) DEVICE — STPL LINEAR CUT 75MM TLC75

## (undated) DEVICE — DRAPE UNDER BUTTOCK 89415

## (undated) DEVICE — SUCTION TIP POOLE K770

## (undated) DEVICE — SU PROLENE 2-0 CT-2 30" 8411H

## (undated) DEVICE — TUBING SUCTION 12"X1/4" N612

## (undated) DEVICE — DEVICE CATH STABILIZATION STATLOCK FOLEY 2-WAY FOL0102

## (undated) DEVICE — SOL NACL 0.9% IRRIG 3000ML BAG 2B7477

## (undated) DEVICE — STPL ENDO HANDLE GIA ULTRA UNIVERSAL STD EGIAUSTND

## (undated) DEVICE — ENDO TROCAR FIRST ENTRY KII FIOS Z-THRD 12X100MM CTF73

## (undated) DEVICE — SU VICRYL 3-0 SH 27" J316H

## (undated) DEVICE — SUCTION TIP YANKAUER W/O VENT K86

## (undated) DEVICE — STPL SKIN 35W APPOSE 8886803712

## (undated) DEVICE — DRSG STERI STRIP 1/2X4" R1547

## (undated) DEVICE — BLADE KNIFE SURG 15 371115

## (undated) DEVICE — DRSG TEGADERM 2 3/8X2 3/4" 1624W

## (undated) DEVICE — GOWN IMPERVIOUS ZONED XLG 9041

## (undated) DEVICE — TUBE CULTURE ANAEROBIC PORT-A-CUL 11ML

## (undated) DEVICE — ESU GROUND PAD ADULT W/CORD E7507

## (undated) DEVICE — SUCTION IRR STRYKERFLOW II W/TIP 250-070-520

## (undated) DEVICE — STPL POWERED ECHELON 45MM PSEE45A

## (undated) RX ORDER — ONDANSETRON 2 MG/ML
INJECTION INTRAMUSCULAR; INTRAVENOUS
Status: DISPENSED
Start: 2018-03-31

## (undated) RX ORDER — NEOSTIGMINE METHYLSULFATE 1 MG/ML
VIAL (ML) INJECTION
Status: DISPENSED
Start: 2018-03-31

## (undated) RX ORDER — ONDANSETRON 2 MG/ML
INJECTION INTRAMUSCULAR; INTRAVENOUS
Status: DISPENSED
Start: 2018-08-22

## (undated) RX ORDER — GLYCOPYRROLATE 0.2 MG/ML
INJECTION INTRAMUSCULAR; INTRAVENOUS
Status: DISPENSED
Start: 2018-08-22

## (undated) RX ORDER — EPHEDRINE SULFATE 50 MG/ML
INJECTION, SOLUTION INTRAMUSCULAR; INTRAVENOUS; SUBCUTANEOUS
Status: DISPENSED
Start: 2018-03-31

## (undated) RX ORDER — PROPOFOL 10 MG/ML
INJECTION, EMULSION INTRAVENOUS
Status: DISPENSED
Start: 2018-08-22

## (undated) RX ORDER — FENTANYL CITRATE 50 UG/ML
INJECTION, SOLUTION INTRAMUSCULAR; INTRAVENOUS
Status: DISPENSED
Start: 2018-03-31

## (undated) RX ORDER — PHENYLEPHRINE HCL IN 0.9% NACL 1 MG/10 ML
SYRINGE (ML) INTRAVENOUS
Status: DISPENSED
Start: 2018-03-31

## (undated) RX ORDER — BUPIVACAINE HYDROCHLORIDE 5 MG/ML
INJECTION, SOLUTION EPIDURAL; INTRACAUDAL
Status: DISPENSED
Start: 2018-03-31

## (undated) RX ORDER — GLYCOPYRROLATE 0.2 MG/ML
INJECTION INTRAMUSCULAR; INTRAVENOUS
Status: DISPENSED
Start: 2018-03-31

## (undated) RX ORDER — FENTANYL CITRATE 50 UG/ML
INJECTION, SOLUTION INTRAMUSCULAR; INTRAVENOUS
Status: DISPENSED
Start: 2018-08-22

## (undated) RX ORDER — BUPIVACAINE HYDROCHLORIDE 5 MG/ML
INJECTION, SOLUTION EPIDURAL; INTRACAUDAL
Status: DISPENSED
Start: 2018-08-22

## (undated) RX ORDER — KETOROLAC TROMETHAMINE 30 MG/ML
INJECTION, SOLUTION INTRAMUSCULAR; INTRAVENOUS
Status: DISPENSED
Start: 2018-08-22

## (undated) RX ORDER — SCOLOPAMINE TRANSDERMAL SYSTEM 1 MG/1
PATCH, EXTENDED RELEASE TRANSDERMAL
Status: DISPENSED
Start: 2018-08-22

## (undated) RX ORDER — NEOSTIGMINE METHYLSULFATE 1 MG/ML
VIAL (ML) INJECTION
Status: DISPENSED
Start: 2018-08-22

## (undated) RX ORDER — DEXAMETHASONE SODIUM PHOSPHATE 4 MG/ML
INJECTION, SOLUTION INTRA-ARTICULAR; INTRALESIONAL; INTRAMUSCULAR; INTRAVENOUS; SOFT TISSUE
Status: DISPENSED
Start: 2018-08-22

## (undated) RX ORDER — HYDROMORPHONE HYDROCHLORIDE 1 MG/ML
INJECTION, SOLUTION INTRAMUSCULAR; INTRAVENOUS; SUBCUTANEOUS
Status: DISPENSED
Start: 2018-03-31

## (undated) RX ORDER — LIDOCAINE HYDROCHLORIDE 10 MG/ML
INJECTION, SOLUTION EPIDURAL; INFILTRATION; INTRACAUDAL; PERINEURAL
Status: DISPENSED
Start: 2018-08-22